# Patient Record
Sex: FEMALE | Employment: OTHER | URBAN - METROPOLITAN AREA
[De-identification: names, ages, dates, MRNs, and addresses within clinical notes are randomized per-mention and may not be internally consistent; named-entity substitution may affect disease eponyms.]

---

## 2017-06-10 LAB
ALT SERPL-CCNC: 4 IU/L (ref 0–32)
AST SERPL-CCNC: 5 IU/L (ref 0–40)
BUN SERPL-MCNC: 18 MG/DL (ref 8–27)
BUN/CREAT SERPL: 21 (ref 12–28)
CALCIUM SERPL-MCNC: 9.7 MG/DL (ref 8.7–10.3)
CHLORIDE SERPL-SCNC: 102 MMOL/L (ref 96–106)
CHOLEST SERPL-MCNC: 122 MG/DL (ref 100–199)
CO2 SERPL-SCNC: 23 MMOL/L (ref 18–29)
CREAT SERPL-MCNC: 0.86 MG/DL (ref 0.57–1)
EST. AVERAGE GLUCOSE BLD GHB EST-MCNC: 163 MG/DL
GLUCOSE SERPL-MCNC: 121 MG/DL (ref 65–99)
HBA1C MFR BLD: 7.3 % (ref 4.8–5.6)
HDLC SERPL-MCNC: 48 MG/DL
INTERPRETATION, 910389: NORMAL
LDLC SERPL CALC-MCNC: 62 MG/DL (ref 0–99)
Lab: NORMAL
POTASSIUM SERPL-SCNC: 3.6 MMOL/L (ref 3.5–5.2)
SODIUM SERPL-SCNC: 141 MMOL/L (ref 134–144)
TRIGL SERPL-MCNC: 59 MG/DL (ref 0–149)
VLDLC SERPL CALC-MCNC: 12 MG/DL (ref 5–40)

## 2017-06-14 DIAGNOSIS — E11.9 TYPE 2 DIABETES MELLITUS WITHOUT COMPLICATION, WITHOUT LONG-TERM CURRENT USE OF INSULIN (HCC): ICD-10-CM

## 2017-06-14 NOTE — LETTER
6/15/2017 8:34 AM 
 
Ms. Rosas Hill [address] Dear Rosas Hill: Please find your most recent results below. Resulted Orders ALT Result Value Ref Range ALT (SGPT) 4 0 - 32 IU/L Narrative Performed at:  [address]  [id number] : Tarun Nñio MD, Phone:  [phone] AST Result Value Ref Range AST (SGOT) 5 0 - 40 IU/L Narrative Performed at:  [address]  [id number] : Tarun Niño MD, Phone:  [phone] LIPID PANEL Result Value Ref Range Cholesterol, total 122 100 - 199 mg/dL Triglyceride 59 0 - 149 mg/dL HDL Cholesterol 48 >39 mg/dL VLDL, calculated 12 5 - 40 mg/dL LDL, calculated 62 0 - 99 mg/dL Narrative Performed at:  [address]  [id number] : Tarun Niño MD, Phone:  [phone] METABOLIC PANEL, BASIC Result Value Ref Range Glucose 121 (H) 65 - 99 mg/dL BUN 18 8 - 27 mg/dL Creatinine 0.86 0.57 - 1.00 mg/dL GFR est non-AA 62 >59 mL/min/1.73 GFR est AA 71 >59 mL/min/1.73  
 BUN/Creatinine ratio 21 12 - 28 Sodium 141 134 - 144 mmol/L Potassium 3.6 3.5 - 5.2 mmol/L Chloride 102 96 - 106 mmol/L  
 CO2 23 18 - 29 mmol/L Calcium 9.7 8.7 - 10.3 mg/dL Narrative Performed at:  [address]  [id number] : Tarun Niño MD, Phone:  [phone] HEMOGLOBIN A1C Result Value Ref Range Hemoglobin A1c 7.3 (H) 4.8 - 5.6 % Comment:  
            Pre-diabetes: 5.7 - 6.4 Diabetes: >6.4 Glycemic control for adults with diabetes: <7.0 Estimated average glucose 163 mg/dL Narrative Performed at:  [address]  [id number] : Tarun Niño MD, Phone:  [phone] CVD REPORT Result Value Ref Range INTERPRETATION Note Comment:  
   Medical Director's Note: This is an amended report on 
6/10/2017. The following panels were previously not 
reported: Albumin: Creatinine Ratio. Please review this 
report in its entirety, since changes may affect result 
interpretation(s) and/or treatment/follow-up suggestions. Supplement report is available. Narrative Performed at:  [address] [place] [address]  [id number] : Irina Maurer PhD, Phone:  [phone] DIABETES PATIENT EDUCATION Result Value Ref Range PDF Image Not applicable Narrative Performed at:  [address] [place] [address]  [id number] : Irina Maurer PhD, Phone:  [phone] RECOMMENDATIONS: 
All labs are stable. Please call me if you have any questions: [phone] Sincerely, 
 
 
Kourtney Goff, DO

## 2017-06-16 ENCOUNTER — OFFICE VISIT (OUTPATIENT)
Dept: INTERNAL MEDICINE CLINIC | Age: 82
End: 2017-06-16

## 2017-06-16 VITALS
WEIGHT: 110 LBS | HEIGHT: 62 IN | HEART RATE: 90 BPM | TEMPERATURE: 98.3 F | OXYGEN SATURATION: 97 % | BODY MASS INDEX: 20.24 KG/M2 | DIASTOLIC BLOOD PRESSURE: 50 MMHG | RESPIRATION RATE: 16 BRPM | SYSTOLIC BLOOD PRESSURE: 114 MMHG

## 2017-06-16 DIAGNOSIS — E78.00 HYPERCHOLESTEROLEMIA: ICD-10-CM

## 2017-06-16 DIAGNOSIS — E11.9 TYPE 2 DIABETES MELLITUS WITHOUT COMPLICATION, WITHOUT LONG-TERM CURRENT USE OF INSULIN (HCC): ICD-10-CM

## 2017-06-16 DIAGNOSIS — E11.9 TYPE 2 DIABETES MELLITUS WITHOUT COMPLICATION, WITHOUT LONG-TERM CURRENT USE OF INSULIN (HCC): Primary | ICD-10-CM

## 2017-06-16 DIAGNOSIS — Z23 ENCOUNTER FOR IMMUNIZATION: ICD-10-CM

## 2017-06-16 DIAGNOSIS — R41.3 MEMORY IMPAIRMENT: ICD-10-CM

## 2017-06-16 DIAGNOSIS — Z71.89 ACP (ADVANCE CARE PLANNING): ICD-10-CM

## 2017-06-16 DIAGNOSIS — Z00.00 MEDICARE ANNUAL WELLNESS VISIT, SUBSEQUENT: ICD-10-CM

## 2017-06-16 DIAGNOSIS — I10 ESSENTIAL HYPERTENSION: ICD-10-CM

## 2017-06-16 DIAGNOSIS — I69.319 CVA, OLD, COGNITIVE DEFICITS: ICD-10-CM

## 2017-06-16 RX ORDER — DONEPEZIL HYDROCHLORIDE 5 MG/1
5 TABLET, FILM COATED ORAL
Qty: 30 TAB | Refills: 1 | Status: SHIPPED | OUTPATIENT
Start: 2017-06-16 | End: 2017-12-01 | Stop reason: ALTCHOICE

## 2017-06-16 RX ORDER — POTASSIUM CHLORIDE 750 MG/1
TABLET, EXTENDED RELEASE ORAL
COMMUNITY
Start: 2017-05-10 | End: 2017-10-30 | Stop reason: SDUPTHER

## 2017-06-16 NOTE — PROGRESS NOTES
HISTORY OF PRESENT ILLNESS  Marcellus Nguyễn is a 80 y.o. female. Pt. comes in with her daughter for f/u. They live together. Has multiple medical problems. Her diabetes and hypertension are stable. Appetite is fair. Daughter reports worsening of pt's memory. Patient agrees. Reports compliance with medications and diet. Med list and most recent labs/studies reviewed with pt. Hgb is 7.3. Trying to be active physically to control weight. Reports insomnia. Could not tolerate trazodone. No tobacco or alcohol use. No allergies. Reports no other new c/o. Hypertension    Associated symptoms include blurred vision. Pertinent negatives include no chest pain, no malaise/fatigue, no headaches, no dizziness and no shortness of breath. Diabetes   Pertinent negatives include no chest pain, no abdominal pain, no headaches and no shortness of breath. Memory Loss    Her past medical history is significant for hypertension. Follow Up Chronic Condition   Pertinent negatives include no chest pain, no abdominal pain, no headaches and no shortness of breath. Review of Systems   Constitutional: Negative. Negative for malaise/fatigue. Eyes: Positive for blurred vision. Respiratory: Negative for shortness of breath. Cardiovascular: Negative for chest pain and leg swelling. Gastrointestinal: Negative for abdominal pain. Genitourinary: Negative for dysuria and frequency. Musculoskeletal: Positive for back pain and joint pain. Negative for falls. Skin: Negative for rash. Neurological: Negative for dizziness, sensory change and headaches. Psychiatric/Behavioral: Positive for memory loss. Negative for depression. The patient has insomnia. The patient is not nervous/anxious. All other systems reviewed and are negative. Physical Exam   Constitutional: She is oriented to person, place, and time. She appears well-developed and well-nourished. No distress.    HENT:   Head: Normocephalic and atraumatic. Mouth/Throat: Oropharynx is clear and moist.   Eyes: Conjunctivae are normal. No scleral icterus. Neck: Normal range of motion. Neck supple. No JVD present. No thyromegaly present. Cardiovascular: Normal rate, regular rhythm, normal heart sounds and intact distal pulses. No murmur heard. Pulmonary/Chest: Effort normal and breath sounds normal. No respiratory distress. She has no wheezes. She has no rales. Abdominal: Soft. Bowel sounds are normal. She exhibits no distension. There is no tenderness. Musculoskeletal: She exhibits no edema or tenderness. djd   Neurological: She is alert and oriented to person, place, and time. Coordination normal.   A+O to name and Romain  Doesn't know date or president's name   Skin: Skin is warm and dry. No rash noted. Psychiatric: She has a normal mood and affect. Her behavior is normal.   Nursing note and vitals reviewed. ASSESSMENT and PLAN  Tavon Whitman was seen today for hypertension, diabetes, memory loss and follow up chronic condition. Diagnoses and all orders for this visit:    Type 2 diabetes mellitus without complication, without long-term current use of insulin ([facility] [address]  -     LIPID PANEL; Future  -     METABOLIC PANEL, BASIC; Future  -     ALT; Future  -     AST; Future  -     HEMOGLOBIN A1C WITH EAG; Future    Encounter for immunization  -     Pneumococcal Admin ()  -     Pneumococcal Polysaccharide Vaccine    Essential hypertension    Hypercholesterolemia    CVA, old, cognitive deficits    Memory impairment    ACP (advance care planning)    Medicare annual wellness visit, subsequent    Other orders  -     donepezil (ARICEPT) 5 mg tablet; Take 1 Tab by mouth nightly. Follow-up Disposition:  Return in about 1 month (around 7/16/2017).    lab results and schedule of future lab studies reviewed with patient  reviewed diet, exercise and weight control  reviewed medications and side effects in detail  low cholesterol diet, weight control and daily exercise discussed, home glucose monitoring emphasized, all medications, side effects and compliance discussed carefully, foot care discussed and Podiatry visits discussed, annual eye examinations at Ophthalmology discussed, glycohemoglobin and other lab monitoring discussed and long term diabetic complications discussed  Overall stable  I have reviewed and agree with Medicare Annual Wellness visit done by NIMA Chaparro NP

## 2017-06-16 NOTE — PATIENT INSTRUCTIONS
Schedule of Personalized Health Plan  (Provide Copy to Patient)  The best way to stay healthy is to live a healthy lifestyle. A healthy lifestyle includes regular exercise, eating a well-balanced diet, keeping a healthy weight and not smoking. Regular physical exams and screening tests are another important way to take care of yourself. Preventive exams provided by health care providers can find health problems early when treatment works best and can keep you from getting certain diseases or illnesses. Preventive services include exams, lab tests, screenings, shots, monitoring and information to help you take care of your own health. All people over 65 should have a pneumonia shot. Pneumonia shots are usually only needed once in a lifetime unless your doctor decides differently. Given today. All people over 65 should have a yearly flu shot. Due in fall 2017    Bone mass measurement (dexa scan) is recommended every two years    Diabetes Mellitus screening is recommended every year. Glaucoma is an eye disease caused by high pressure in the eye. An eye exam is recommended every year. Up to date. Cardiovascular screening tests that check your cholesterol and other blood fat (lipid) levels are recommended every five years. Up to date.     Here is a list of your current Health Maintenance items with a due date:  Health Maintenance   Topic Date Due    OSTEOPOROSIS SCREENING (DEXA)  11/14/1996    Pneumococcal 65+ Low/Medium Risk (2 of 2 - PPSV23) Given today    INFLUENZA AGE 9 TO ADULT  08/01/2017    EYE EXAM RETINAL OR DILATED Q1  08/11/2017    HEMOGLOBIN A1C Q6M  12/09/2017    FOOT EXAM Q1  12/16/2017    MICROALBUMIN Q1  06/09/2018    LIPID PANEL Q1  06/09/2018    MEDICARE YEARLY EXAM  06/17/2018    GLAUCOMA SCREENING Q2Y  08/11/2018    DTaP/Tdap/Td series (2 - Td) 06/16/2027    ZOSTER VACCINE AGE 60>  Completed

## 2017-06-16 NOTE — PROGRESS NOTES
Chief Complaint   Patient presents with    Hypertension    Diabetes     Reviewed record  In preparation for visit and have obtained necessary documentation. 1. Have you been to the ER, urgent care clinic since your last visit? Hospitalized since your last visit? No    2. Have you seen or consulted any other health care providers outside of the [address] [place] [address] since your last visit? Include any pap smears or colon screening. No      Used 2 patient I. D. 's  Patient observed X 15 min following vaccine, no side effects noted.

## 2017-06-16 NOTE — MR AVS SNAPSHOT
Visit Information Date & Time Provider Department Dept. Phone Encounter #  
 6/16/2017  1:45 PM Dave [facility] Internal Medicine [phone] [id number] Follow-up Instructions Return in about 1 month (around 7/16/2017). Upcoming Health Maintenance Date Due OSTEOPOROSIS SCREENING (DEXA) 11/14/1996 INFLUENZA AGE 9 TO ADULT 8/1/2017 EYE EXAM RETINAL OR DILATED Q1 8/11/2017 HEMOGLOBIN A1C Q6M 12/9/2017 FOOT EXAM Q1 12/16/2017 MICROALBUMIN Q1 6/9/2018 LIPID PANEL Q1 6/9/2018 MEDICARE YEARLY EXAM 6/17/2018 GLAUCOMA SCREENING Q2Y 8/11/2018 DTaP/Tdap/Td series (2 - Td) 6/16/2027 Allergies as of 6/16/2017  Review Complete On: 6/16/2017 By: Lobo Bhatti, DO No Known Allergies Current Immunizations  Reviewed on 12/16/2016 Name Date Influenza High Dose Vaccine PF 12/16/2016, 11/27/2015 12:09 PM  
 Influenza Vaccine 11/14/2014 Pneumococcal Conjugate (PCV-13) 11/27/2015 12:10 PM  
 Pneumococcal Polysaccharide (PPSV-23)  Incomplete Not reviewed this visit You Were Diagnosed With   
  
 Codes Comments Type 2 diabetes mellitus without complication, without long-term current use of insulin (HCC)    -  Primary ICD-10-CM: E11.9 ICD-9-CM: 250.00 Encounter for immunization     ICD-10-CM: E32 ICD-9-CM: V03.89 Essential hypertension     ICD-10-CM: I10 
ICD-9-CM: 401.9 Hypercholesterolemia     ICD-10-CM: E78.00 ICD-9-CM: 272.0   
 CVA, old, cognitive deficits     ICD-10-CM: I69.319 ICD-9-CM: 438.0 Memory impairment     ICD-10-CM: R41.3 ICD-9-CM: 780.93 Vitals BP Pulse Temp Resp Height(growth percentile) Weight(growth percentile) 114/50 90 98.3 °F (36.8 °C) (Oral) 16 5' 2\" (1.575 m) 110 lb (49.9 kg) SpO2 BMI OB Status Smoking Status 97% 20.12 kg/m2 Hysterectomy Former Smoker Vitals History BMI and BSA Data  Body Mass Index Body Surface Area  
 20.12 kg/m 2 1.48 m 2  
  
  
 Preferred Pharmacy Pharmacy Name Phone [address] [facility], [address] [place] [address] [phone] Your Updated Medication List  
  
   
This list is accurate as of: 6/16/17  1:49 PM.  Always use your most recent med list.  
  
  
  
  
 acetaminophen 650 mg Tab Take 650 mg by mouth every six (6) hours as needed. amLODIPine 10 mg tablet Commonly known as:  Rani Million TAKE ONE TABLET BY MOUTH EVERY DAY  
  
 aspirin 325 mg tablet Commonly known as:  ASPIRIN Take 1 Tab by mouth daily. atorvastatin 40 mg tablet Commonly known as:  LIPITOR  
TAKE ONE TABLET BY MOUTH EVERY DAY  
  
 cloNIDine HCl 0.1 mg tablet Commonly known as:  CATAPRES  
TAKE ONE TABLET BY MOUTH AT BEDTIME  
  
 donepezil 5 mg tablet Commonly known as:  ARICEPT Take 1 Tab by mouth nightly. * KLOR-CON 10 10 mEq tablet Generic drug:  potassium chloride SR Take 10 mEq by mouth. 2 po daily * potassium chloride 10 mEq tablet Commonly known as:  KLOR-CON  
  
 losartan-hydroCHLOROthiazide 100-25 mg per tablet Commonly known as:  HYZAAR  
TAKE ONE TABLET BY MOUTH EVERY DAY  
  
 metFORMIN 500 mg tablet Commonly known as:  GLUCOPHAGE  
TAKE ONE TABLET BY MOUTH TWO TIMES A DAY WITH MEALS * Notice: This list has 2 medication(s) that are the same as other medications prescribed for you. Read the directions carefully, and ask your doctor or other care provider to review them with you. Prescriptions Sent to Pharmacy Refills  
 donepezil (ARICEPT) 5 mg tablet 1 Sig: Take 1 Tab by mouth nightly. Class: Normal  
 Pharmacy: [address] [place] [address] #: [phone] Route: Oral  
  
We Performed the Following ADMIN PNEUMOCOCCAL VACCINE [ [facility]] PNEUMOCOCCAL POLYSACCHARIDE VACCINE, 23-VALENT, ADULT OR IMMUNOSUPPRESSED PT DOSE, [64679 CPT(R)] Follow-up Instructions Return in about 1 month (around 7/16/2017). To-Do List   
 12/13/2017 Lab:  ALT   
  
 12/13/2017 Lab:  AST   
  
 12/13/2017 Lab:  LIPID PANEL   
  
 12/13/2017 Lab:  METABOLIC PANEL, BASIC   
  
 12/16/2017 Lab:  HEMOGLOBIN A1C WITH EAG Patient Instructions Schedule of Personalized Health Plan (Provide Copy to Patient) The best way to stay healthy is to live a healthy lifestyle. A healthy lifestyle includes regular exercise, eating a well-balanced diet, keeping a healthy weight and not smoking. Regular physical exams and screening tests are another important way to take care of yourself. Preventive exams provided by health care providers can find health problems early when treatment works best and can keep you from getting certain diseases or illnesses. Preventive services include exams, lab tests, screenings, shots, monitoring and information to help you take care of your own health. All people over 65 should have a pneumonia shot. Pneumonia shots are usually only needed once in a lifetime unless your doctor decides differently. Given today. All people over 65 should have a yearly flu shot. Due in fall 2017 Bone mass measurement (dexa scan) is recommended every two years Diabetes Mellitus screening is recommended every year. Glaucoma is an eye disease caused by high pressure in the eye. An eye exam is recommended every year. Up to date. Cardiovascular screening tests that check your cholesterol and other blood fat (lipid) levels are recommended every five years. Up to date. Here is a list of your current Health Maintenance items with a due date: 
Health Maintenance Topic Date Due  
 OSTEOPOROSIS SCREENING (DEXA)  11/14/1996  Pneumococcal 65+ Low/Medium Risk (2 of 2 - PPSV23) Given today  INFLUENZA AGE 9 TO ADULT  08/01/2017  
 EYE EXAM RETINAL OR DILATED Q1  08/11/2017  
 HEMOGLOBIN A1C Q6M  12/09/2017  
 FOOT EXAM Q1  12/16/2017  MICROALBUMIN Q1  06/09/2018  LIPID PANEL Q1  06/09/2018  MEDICARE YEARLY EXAM  06/17/2018  GLAUCOMA SCREENING Q2Y  08/11/2018  DTaP/Tdap/Td series (2 - Td) 06/16/2027  ZOSTER VACCINE AGE 60>  Completed Introducing [facility]! [employer] introduces Adteractive patient portal. Now you can access parts of your medical record, email your doctor's office, and request medication refills online. 1. In your internet browser, go to [email or web address] 2. Click on the First Time User? Click Here link in the Sign In box. You will see the New Member Sign Up page. 3. Enter your Adteractive Access Code exactly as it appears below. You will not need to use this code after youve completed the sign-up process. If you do not sign up before the expiration date, you must request a new code. · Adteractive Access Code: [email or web address] Expires: 9/14/2017  1:41 PM 
 
4. Enter the last four digits of your Social Security Number (xxxx) and Date of Birth (mm/dd/yyyy) as indicated and click Submit. You will be taken to the next sign-up page. 5. Create a Adteractive ID. This will be your Adteractive login ID and cannot be changed, so think of one that is secure and easy to remember. 6. Create a Adteractive password. You can change your password at any time. 7. Enter your Password Reset Question and Answer. This can be used at a later time if you forget your password. 8. Enter your e-mail address. You will receive e-mail notification when new information is available in [address] 9. Click Sign Up. You can now view and download portions of your medical record. 10. Click the Download Summary menu link to download a portable copy of your medical information. If you have questions, please visit the Frequently Asked Questions section of the Adteractive website. Remember, [employer]teractive is NOT to be used for urgent needs. For medical emergencies, dial 911. Now available from your iPhone and Android! Please provide this summary of care documentation to your next provider. Your primary care clinician is listed as Yadi Turner. If you have any questions after today's visit, please call [phone].

## 2017-06-16 NOTE — PROGRESS NOTES
David Humphrey is a 80 y.o. female and presents for annual Medicare Wellness Visit. Problem List: Reviewed with patient and discussed risk factors. Patient Active Problem List   Diagnosis Code    [facility] O18.0    Essential hypertension I10    Hypercholesterolemia E78.00    CVA, old, cognitive deficits I69.319    Insomnia G47.00    Cataracts, bilateral H26.9    Chronic diarrhea K52.9    ACP (advance care planning) Z71.89    Memory impairment R41.3       Current medical providers:  Patient Care Team:  Nuvia Abraham DO as PCP - General (Internal Medicine)    PSH: Reviewed with patient  History reviewed. No pertinent surgical history. SH: Reviewed with patient  Social History   Substance Use Topics    Smoking status: Former Smoker     Packs/day: 0.25     Quit date: 1/1/2012    Smokeless tobacco: Never Used    Alcohol use No       FH: Reviewed with patient  Family History   Problem Relation Age of Onset    No Known Problems Mother     No Known Problems Father        Medications/Allergies: Reviewed with patient  Current Outpatient Prescriptions on File Prior to Visit   Medication Sig Dispense Refill    amLODIPine (NORVASC) 10 mg tablet TAKE ONE TABLET BY MOUTH EVERY DAY 30 Tab 3    losartan-hydroCHLOROthiazide (HYZAAR) 100-25 mg per tablet TAKE ONE TABLET BY MOUTH EVERY DAY 30 Tab 11    cloNIDine HCl (CATAPRES) 0.1 mg tablet TAKE ONE TABLET BY MOUTH AT BEDTIME 30 Tab 5    metFORMIN (GLUCOPHAGE) 500 mg tablet TAKE ONE TABLET BY MOUTH TWO TIMES A DAY WITH MEALS 60 Tab 5    atorvastatin (LIPITOR) 40 mg tablet TAKE ONE TABLET BY MOUTH EVERY DAY 30 Tab 5    traZODone (DESYREL) 100 mg tablet TAKE ONE TABLET BY MOUTH AT NIGHT 30 Tab 5    acetaminophen 650 mg Tab Take 650 mg by mouth every six (6) hours as needed. 100 Tab 0    aspirin (ASPIRIN) 325 mg tablet Take 1 Tab by mouth daily.  100 Tab 0    potassium chloride SR (KLOR-CON 10) 10 mEq tablet Take 10 mEq by mouth. 2 po daily       No current facility-administered medications on file prior to visit. No Known Allergies    Objective:  Visit Vitals    /50    Pulse 90    Temp 98.3 °F (36.8 °C) (Oral)    Resp 16    Ht 5' 2\" (1.575 m)    Wt 110 lb (49.9 kg)    SpO2 97%    BMI 20.12 kg/m2    Body mass index is 20.12 kg/(m^2). Assessment of cognitive impairment: Alert and oriented x 3    Depression Screen:   PHQ over the last two weeks 6/16/2017   Little interest or pleasure in doing things Not at all   Feeling down, depressed or hopeless Not at all   Total Score PHQ 2 0       Fall Risk Assessment:    Fall Risk Assessment, last 12 mths 6/16/2017   Able to walk? Yes   Fall in past 12 months? No       Functional Ability:   Does the patient exhibit a steady gait? yes   How long did it take the patient to get up and walk from a sitting position? <5 seconds   Is the patient self reliant?  (ie can do own laundry, meals, household chores)  yes     Does the patient handle his/her own medications? yes     Does the patient handle his/her own money? no     Is the patients home safe (ie good lighting, handrails on stairs and bath, etc.)? yes     Did you notice or did patient express any hearing difficulties? no     Did you notice or did patient express any vision difficulties?    no          Advance Care Planning:   Patient was offered the opportunity to discuss advance care planning:  yes     Does patient have an Advance Directive:  yes          Plan:      Orders Placed This Encounter    Pneumococcal Polysaccharide Vaccine    Pneumococcal Admin ()    potassium chloride (KLOR-CON) 10 mEq tablet       Health Maintenance   Topic Date Due    OSTEOPOROSIS SCREENING (DEXA)  11/14/1996    Pneumococcal 65+ Low/Medium Risk (2 of 2 - PPSV23) Given today    INFLUENZA AGE 9 TO ADULT  08/01/2017    EYE EXAM RETINAL OR DILATED Q1  08/11/2017    HEMOGLOBIN A1C Q6M  12/09/2017    FOOT EXAM Q1 12/16/2017    MICROALBUMIN Q1  06/09/2018    LIPID PANEL Q1  06/09/2018    MEDICARE YEARLY EXAM  06/17/2018    GLAUCOMA SCREENING Q2Y  08/11/2018    DTaP/Tdap/Td series (2 - Td) 06/16/2027    ZOSTER VACCINE AGE 60>  Completed       *Patient verbalized understanding and agreement with the plan. A copy of the After Visit Summary with personalized health plan was given to the patient today.

## 2017-06-16 NOTE — ACP (ADVANCE CARE PLANNING)
Pt daughter states that the patient has an advanced care plan and that she will bring it to her next visit.

## 2017-10-16 RX ORDER — CLONIDINE HYDROCHLORIDE 0.1 MG/1
TABLET ORAL
Qty: 30 TAB | Refills: 0 | Status: SHIPPED | OUTPATIENT
Start: 2017-10-16 | End: 2017-12-04 | Stop reason: SDUPTHER

## 2017-11-14 RX ORDER — AMLODIPINE BESYLATE 10 MG/1
TABLET ORAL
Qty: 30 TAB | Refills: 0 | Status: SHIPPED | OUTPATIENT
Start: 2017-11-14 | End: 2018-01-02 | Stop reason: SDUPTHER

## 2017-11-24 NOTE — LETTER
11/29/2017 3:59 PM 
 
Ms. Lars Schlatter [address] Dear Rene Schlatter: Please find your most recent results below. Resulted Orders METABOLIC PANEL, BASIC Result Value Ref Range Glucose 123 (H) 65 - 99 mg/dL BUN 17 8 - 27 mg/dL Creatinine 0.94 0.57 - 1.00 mg/dL GFR est non-AA 55 (L) >59 mL/min/1.73 GFR est AA 64 >59 mL/min/1.73  
 BUN/Creatinine ratio 18 12 - 28 Sodium 140 134 - 144 mmol/L Potassium 4.2 3.5 - 5.2 mmol/L Chloride 102 96 - 106 mmol/L  
 CO2 21 18 - 29 mmol/L Calcium 9.7 8.7 - 10.3 mg/dL Narrative Performed at:  [address]  [id number] : Patricia Gay MD, Phone:  [phone] LIPID PANEL Result Value Ref Range Cholesterol, total 127 100 - 199 mg/dL Triglyceride 57 0 - 149 mg/dL HDL Cholesterol 58 >39 mg/dL VLDL, calculated 11 5 - 40 mg/dL LDL, calculated 58 0 - 99 mg/dL Narrative Performed at:  [address]  [id number] : Patricia Gay MD, Phone:  [phone] CVD REPORT Result Value Ref Range INTERPRETATION Note Comment:  
   Supplemental report is available. PDF IMAGE Not applicable Narrative Performed at:  [address] [place] [address]  [id number] : Cristofer Ryan PhD, Phone:  [phone] CKD REPORT Result Value Ref Range Interpretation Note Comment:  
   Supplemental report is available. Narrative Performed at:  [address] [place] [address]  [id number] : Cristofer Ryan PhD, Phone:  [phone] HEMOGLOBIN A1C WITH EAG Result Value Ref Range Hemoglobin A1c 7.3 (H) 4.8 - 5.6 % Comment:  
            Pre-diabetes: 5.7 - 6.4 Diabetes: >6.4 Glycemic control for adults with diabetes: <7.0 Estimated average glucose 163 mg/dL Narrative Performed at:  [address]  [id number] : Martha Morales MD, Phone:  [phone] DIABETES PATIENT EDUCATION Result Value Ref Range PDF Image Not applicable Narrative Performed at:  [address] [place] [address]  [id number] : Sathya Hanna PhD, Phone:  [phone] AST Result Value Ref Range AST (SGOT) 9 0 - 40 IU/L Narrative Performed at:  [address]  [id number] : Martha Morales MD, Phone:  [phone] ALT Result Value Ref Range ALT (SGPT) 7 0 - 32 IU/L Narrative Performed at:  [address]  [id number] : Martha Morales MD, Phone:  [phone] RECOMMENDATIONS: 
1.  Hemoglobin A1c 7.3.  Encourage patient to eat a healther Mediterranean style diet with 55% or less of calories from carbohydrates. Try to eat more vegetables, whole fruit, nuts, whole grains, yogurt and less refined grains. Eat less red meat. Chicken and fish would be good protein sources. Aim to eat less than 45 grams of carbohydrates per meal.  
Other labs stable.    
 
Please call me if you have any questions: [phone] Sincerely, 
 
 
Radha Jimenez, DO

## 2017-11-25 LAB
ALT SERPL-CCNC: 7 IU/L (ref 0–32)
AST SERPL-CCNC: 9 IU/L (ref 0–40)
BUN SERPL-MCNC: 17 MG/DL (ref 8–27)
BUN/CREAT SERPL: 18 (ref 12–28)
CALCIUM SERPL-MCNC: 9.7 MG/DL (ref 8.7–10.3)
CHLORIDE SERPL-SCNC: 102 MMOL/L (ref 96–106)
CHOLEST SERPL-MCNC: 127 MG/DL (ref 100–199)
CO2 SERPL-SCNC: 21 MMOL/L (ref 18–29)
CREAT SERPL-MCNC: 0.94 MG/DL (ref 0.57–1)
EST. AVERAGE GLUCOSE BLD GHB EST-MCNC: 163 MG/DL
GFR SERPLBLD CREATININE-BSD FMLA CKD-EPI: 55 ML/MIN/1.73
GFR SERPLBLD CREATININE-BSD FMLA CKD-EPI: 64 ML/MIN/1.73
GLUCOSE SERPL-MCNC: 123 MG/DL (ref 65–99)
HBA1C MFR BLD: 7.3 % (ref 4.8–5.6)
HDLC SERPL-MCNC: 58 MG/DL
INTERPRETATION, 910389: NORMAL
INTERPRETATION: NORMAL
LDLC SERPL CALC-MCNC: 58 MG/DL (ref 0–99)
Lab: NORMAL
PDF IMAGE, 910387: NORMAL
POTASSIUM SERPL-SCNC: 4.2 MMOL/L (ref 3.5–5.2)
SODIUM SERPL-SCNC: 140 MMOL/L (ref 134–144)
TRIGL SERPL-MCNC: 57 MG/DL (ref 0–149)
VLDLC SERPL CALC-MCNC: 11 MG/DL (ref 5–40)

## 2017-11-27 NOTE — PROGRESS NOTES
1.  Hemoglobin A1c 7.3. Encourage patient to eat a healther Mediterranean style diet with 55% or less of calories from carbohydrates. Try to eat more vegetables, whole fruit, nuts, whole grains, yogurt and less refined grains. Eat less red meat. Chicken and fish would be good protein sources. Aim to eat less than 45 grams of carbohydrates per meal.  Other labs stable.

## 2017-12-01 ENCOUNTER — OFFICE VISIT (OUTPATIENT)
Dept: INTERNAL MEDICINE CLINIC | Age: 82
End: 2017-12-01

## 2017-12-01 VITALS
HEIGHT: 62 IN | WEIGHT: 113.4 LBS | RESPIRATION RATE: 18 BRPM | DIASTOLIC BLOOD PRESSURE: 68 MMHG | BODY MASS INDEX: 20.87 KG/M2 | OXYGEN SATURATION: 97 % | HEART RATE: 83 BPM | SYSTOLIC BLOOD PRESSURE: 132 MMHG

## 2017-12-01 DIAGNOSIS — I10 ESSENTIAL HYPERTENSION: ICD-10-CM

## 2017-12-01 DIAGNOSIS — Z23 ENCOUNTER FOR IMMUNIZATION: ICD-10-CM

## 2017-12-01 DIAGNOSIS — E78.00 HYPERCHOLESTEROLEMIA: ICD-10-CM

## 2017-12-01 DIAGNOSIS — I69.319 CVA, OLD, COGNITIVE DEFICITS: ICD-10-CM

## 2017-12-01 DIAGNOSIS — D64.9 CHRONIC ANEMIA: ICD-10-CM

## 2017-12-01 DIAGNOSIS — E11.9 TYPE 2 DIABETES MELLITUS WITHOUT COMPLICATION, WITHOUT LONG-TERM CURRENT USE OF INSULIN (HCC): Primary | ICD-10-CM

## 2017-12-01 NOTE — LETTER
12/6/2017 10:14 AM 
 
Ms. Jaleel Cruz [address] Dear Reggieissa Cruz: Please find your most recent results below. Resulted Orders CBC W/O DIFF Result Value Ref Range WBC 5.9 3.4 - 10.8 x10E3/uL  
 RBC 3.66 (L) 3.77 - 5.28 x10E6/uL HGB 9.9 (L) 11.1 - 15.9 g/dL Comment: **Effective December 4, 2017 the reference interval** 
  for Hemoglobin MALES only will be changing to: Males 13-15 years: 12.6 - 17.7 Males   >15 years: 13.0 - 17.7 HCT 30.5 (L) 34.0 - 46.6 % MCV 83 79 - 97 fL  
 MCH 27.0 26.6 - 33.0 pg  
 MCHC 32.5 31.5 - 35.7 g/dL  
 RDW 16.0 (H) 12.3 - 15.4 % PLATELET 410 076 - 557 x10E3/uL Narrative Performed at:  [address]  [id number] : Spencer Rudolph MD, Phone:  [phone] RECOMMENDATIONS: 
Stable chronic anemia Decrease aspirin to 81 mg daily Start Slow Fe 1 daily Please call me if you have any questions: [phone] Sincerely, 
 
 
Doris Polo, DO

## 2017-12-01 NOTE — PROGRESS NOTES
HISTORY OF PRESENT ILLNESS  Liss Miller is a 80 y.o. female. Pt. comes in with her daughter for f/u. Has multiple medical problems. Reports chronic arthritic pain. Has had some pain in left hip. No fall or trauma. Takes Advil as needed. Has chronic anemia but no GI issues. DM is stable. Memory is stable. Did not take Aricept because of possible side effects. Reports compliance with medications and diet. Med list and most recent labs/studies reviewed with pt. all look stable. Trying to be active physically as tolerated. No tobacco or alcohol use. Reports no other new c/o. Hypertension    Associated symptoms include blurred vision. Pertinent negatives include no chest pain, no headaches, no dizziness and no shortness of breath. Diabetes   Pertinent negatives include no chest pain, no abdominal pain, no headaches and no shortness of breath. Insomnia   Pertinent negatives include no chest pain, no abdominal pain, no headaches and no shortness of breath. Follow Up Chronic Condition   Pertinent negatives include no chest pain, no abdominal pain, no headaches and no shortness of breath. Review of Systems   Constitutional: Negative. HENT: Negative. Eyes: Positive for blurred vision. Respiratory: Negative for shortness of breath. Cardiovascular: Negative for chest pain and leg swelling. Gastrointestinal: Negative for abdominal pain. Genitourinary: Negative for dysuria and frequency. Musculoskeletal: Positive for back pain and joint pain. Negative for falls. Skin: Negative. Neurological: Negative for dizziness, sensory change, focal weakness and headaches. Psychiatric/Behavioral: Positive for memory loss. Negative for depression. The patient has insomnia. The patient is not nervous/anxious. All other systems reviewed and are negative. Physical Exam   Constitutional: She is oriented to person, place, and time. She appears well-developed and well-nourished.  No distress. HENT:   Head: Normocephalic and atraumatic. Mouth/Throat: Oropharynx is clear and moist.   Eyes: Conjunctivae are normal.   Neck: Normal range of motion. Neck supple. No JVD present. No thyromegaly present. Cardiovascular: Normal rate, regular rhythm, normal heart sounds and intact distal pulses. No murmur heard. Pulmonary/Chest: Effort normal and breath sounds normal. No respiratory distress. She has no wheezes. She has no rales. Abdominal: Soft. Bowel sounds are normal. She exhibits no distension. Musculoskeletal: She exhibits no edema or tenderness. djd   Neurological: She is alert and oriented to person, place, and time. Coordination normal.   A+O to name and Romain  Doesn't know date or president's name   Skin: Skin is warm and dry. Psychiatric: She has a normal mood and affect. Her behavior is normal.   Nursing note and vitals reviewed. ASSESSMENT and PLAN  Diagnoses and all orders for this visit:    1. Type 2 diabetes mellitus without complication, without long-term current use of insulin (HCC)  -     LIPID PANEL; Future  -     METABOLIC PANEL, BASIC; Future  -     ALT; Future  -     AST; Future  -     CBC WITH AUTOMATED DIFF; Future  -     HEMOGLOBIN A1C WITH EAG; Future    2. Essential hypertension    3. Hypercholesterolemia    4. Chronic anemia  -     CBC W/O DIFF    5. CVA, old, cognitive deficits    6. Encounter for immunization  -     INFLUENZA VIRUS VACCINE, HIGH DOSE SEASONAL, PRESERVATIVE FREE      Follow-up Disposition:  Return in about 6 months (around 6/1/2018).    lab results and schedule of future lab studies reviewed with patient  reviewed diet, exercise and weight control  reviewed medications and side effects in detail  low cholesterol diet, weight control and daily exercise discussed, home glucose monitoring emphasized, all medications, side effects and compliance discussed carefully, foot care discussed and Podiatry visits discussed, annual eye examinations at Ophthalmology discussed, glycohemoglobin and other lab monitoring discussed and long term diabetic complications discussed  Overall stable  We will recheck CBC today

## 2017-12-01 NOTE — PROGRESS NOTES
Health Maintenance Due   Topic Date Due    OSTEOPOROSIS SCREENING (DEXA)  11/14/1996    Influenza Age 5 to Adult  08/01/2017    EYE EXAM RETINAL OR DILATED Q1  08/11/2017    FOOT EXAM Q1  12/16/2017       Chief Complaint   Patient presents with    Hypertension    Diabetes    Insomnia       1. Have you been to the ER, urgent care clinic since your last visit? Hospitalized since your last visit? No    2. Have you seen or consulted any other health care providers outside of the [address] [place] [address] since your last visit? Include any pap smears or colon screening. No    3) Do you have an Advance Directive on file? no    4) Are you interested in receiving information on Advance Directives? NO      Patient is accompanied by self I have received verbal consent from Brayan Chua to discuss any/all medical information while they are present in the room.

## 2017-12-02 LAB
ERYTHROCYTE [DISTWIDTH] IN BLOOD BY AUTOMATED COUNT: 16 % (ref 12.3–15.4)
HCT VFR BLD AUTO: 30.5 % (ref 34–46.6)
HGB BLD-MCNC: 9.9 G/DL (ref 11.1–15.9)
MCH RBC QN AUTO: 27 PG (ref 26.6–33)
MCHC RBC AUTO-ENTMCNC: 32.5 G/DL (ref 31.5–35.7)
MCV RBC AUTO: 83 FL (ref 79–97)
PLATELET # BLD AUTO: 371 X10E3/UL (ref 150–379)
RBC # BLD AUTO: 3.66 X10E6/UL (ref 3.77–5.28)
WBC # BLD AUTO: 5.9 X10E3/UL (ref 3.4–10.8)

## 2017-12-04 RX ORDER — CLONIDINE HYDROCHLORIDE 0.1 MG/1
TABLET ORAL
Qty: 30 TAB | Refills: 0 | Status: SHIPPED | OUTPATIENT
Start: 2017-12-04 | End: 2018-01-15 | Stop reason: SDUPTHER

## 2017-12-06 RX ORDER — LANOLIN ALCOHOL/MO/W.PET/CERES
325 CREAM (GRAM) TOPICAL
Qty: 30 TAB | Refills: 2 | Status: SHIPPED | OUTPATIENT
Start: 2017-12-06 | End: 2018-03-07 | Stop reason: SDUPTHER

## 2017-12-13 DIAGNOSIS — E11.9 TYPE 2 DIABETES MELLITUS WITHOUT COMPLICATION, WITHOUT LONG-TERM CURRENT USE OF INSULIN (HCC): ICD-10-CM

## 2017-12-16 DIAGNOSIS — E11.9 TYPE 2 DIABETES MELLITUS WITHOUT COMPLICATION, WITHOUT LONG-TERM CURRENT USE OF INSULIN (HCC): ICD-10-CM

## 2017-12-27 ENCOUNTER — OFFICE VISIT (OUTPATIENT)
Dept: INTERNAL MEDICINE CLINIC | Age: 82
End: 2017-12-27

## 2017-12-27 VITALS
DIASTOLIC BLOOD PRESSURE: 64 MMHG | TEMPERATURE: 98.3 F | OXYGEN SATURATION: 95 % | RESPIRATION RATE: 16 BRPM | SYSTOLIC BLOOD PRESSURE: 124 MMHG | BODY MASS INDEX: 20.24 KG/M2 | HEIGHT: 62 IN | WEIGHT: 110 LBS | HEART RATE: 100 BPM

## 2017-12-27 DIAGNOSIS — J40 BRONCHITIS: Primary | ICD-10-CM

## 2017-12-27 RX ORDER — DOXYCYCLINE 100 MG/1
100 TABLET ORAL 2 TIMES DAILY
Qty: 20 TAB | Refills: 0 | Status: SHIPPED | OUTPATIENT
Start: 2017-12-27 | End: 2018-02-02 | Stop reason: ALTCHOICE

## 2017-12-27 RX ORDER — PREDNISONE 5 MG/1
5 TABLET ORAL 2 TIMES DAILY
Qty: 14 TAB | Refills: 0 | Status: SHIPPED | OUTPATIENT
Start: 2017-12-27 | End: 2018-01-03

## 2017-12-27 NOTE — MR AVS SNAPSHOT
Visit Information Date & Time Provider Department Dept. Phone Encounter #  
 12/27/2017 11:00 AM Valerie Li, [facility] Internal Medicine [phone] [id number] Upcoming Health Maintenance Date Due OSTEOPOROSIS SCREENING (DEXA) 11/14/1996 EYE EXAM RETINAL OR DILATED Q1 8/11/2017 HEMOGLOBIN A1C Q6M 5/24/2018 MICROALBUMIN Q1 6/9/2018 MEDICARE YEARLY EXAM 6/17/2018 GLAUCOMA SCREENING Q2Y 8/11/2018 LIPID PANEL Q1 11/24/2018 FOOT EXAM Q1 12/1/2018 DTaP/Tdap/Td series (2 - Td) 6/16/2027 Allergies as of 12/27/2017  Review Complete On: 12/27/2017 By: Valerie Li NP No Known Allergies Current Immunizations  Reviewed on 6/16/2017 Name Date Influenza High Dose Vaccine PF 12/1/2017, 12/16/2016, 11/27/2015 12:09 PM  
 Influenza Vaccine 11/14/2014 Pneumococcal Conjugate (PCV-13) 11/27/2015 12:10 PM  
 Pneumococcal Polysaccharide (PPSV-23) 6/16/2017 Not reviewed this visit You Were Diagnosed With   
  
 Codes Comments Bronchitis    -  Primary ICD-10-CM: Q72 ICD-9-CM: 816 Vitals BP Pulse Temp Resp Height(growth percentile) Weight(growth percentile) 124/64 (BP 1 Location: Right arm, BP Patient Position: Sitting) 100 98.3 °F (36.8 °C) (Oral) 16 5' 2\" (1.575 m) 110 lb (49.9 kg) SpO2 BMI OB Status Smoking Status 95% 20.12 kg/m2 Hysterectomy Former Smoker BMI and BSA Data Body Mass Index Body Surface Area  
 20.12 kg/m 2 1.48 m 2 Preferred Pharmacy Pharmacy Name Phone [facility] [address], [address] [facility] [phone] Your Updated Medication List  
  
   
This list is accurate as of: 12/27/17 12:06 PM.  Always use your most recent med list.  
  
  
  
  
 acetaminophen 650 mg Tab Take 650 mg by mouth every six (6) hours as needed. amLODIPine 10 mg tablet Commonly known as:  Industry Blade TAKE ONE TABLET BY MOUTH EVERY DAY  
  
 aspirin 325 mg tablet Commonly known as:  ASPIRIN Take 1 Tab by mouth daily. atorvastatin 40 mg tablet Commonly known as:  LIPITOR  
TAKE ONE TABLET BY MOUTH EVERY DAY  
  
 cloNIDine HCl 0.1 mg tablet Commonly known as:  CATAPRES  
TAKE ONE TABLET BY MOUTH AT BEDTIME  
  
 doxycycline 100 mg tablet Commonly known as:  ADOXA Take 1 Tab by mouth two (2) times a day. ferrous sulfate 325 mg (65 mg iron) tablet Commonly known as:  Iron (Ferrous Sulfate) Take 1 Tab by mouth Daily (before breakfast). losartan-hydroCHLOROthiazide 100-25 mg per tablet Commonly known as:  HYZAAR  
TAKE ONE TABLET BY MOUTH EVERY DAY  
  
 metFORMIN 500 mg tablet Commonly known as:  GLUCOPHAGE  
TAKE ONE TABLET BY MOUTH TWO TIMES A DAY WITH MEALS  
  
 potassium chloride 10 mEq tablet Commonly known as:  KLOR-CON  
TAKE TWO TABLETS BY MOUTH EVERY DAY  
  
 predniSONE 5 mg tablet Commonly known as:  Alinda Kiersten Take 1 Tab by mouth two (2) times a day for 7 days. Prescriptions Sent to Pharmacy Refills  
 doxycycline (ADOXA) 100 mg tablet 0 Sig: Take 1 Tab by mouth two (2) times a day. Class: Normal  
 Pharmacy: [facility] [address] [place] [address] Ph #: [phone] Route: Oral  
 predniSONE (DELTASONE) 5 mg tablet 0 Sig: Take 1 Tab by mouth two (2) times a day for 7 days. Class: Normal  
 Pharmacy: [address] [place] [address] Ph #: [phone] Route: Oral  
  
Patient Instructions Cough: Care Instructions Your Care Instructions A cough is your body's response to something that bothers your throat or airways. Many things can cause a cough. You might cough because of a cold or the flu, bronchitis, or asthma. Smoking, postnasal drip, allergies, and stomach acid that backs up into your throat also can cause coughs. A cough is a symptom, not a disease. Most coughs stop when the cause, such as a cold, goes away. You can take a few steps at home to cough less and feel better. Follow-up care is a key part of your treatment and safety. Be sure to make and go to all appointments, and call your doctor if you are having problems. It's also a good idea to know your test results and keep a list of the medicines you take. How can you care for yourself at home? · Drink lots of water and other fluids. This helps thin the mucus and soothes a dry or sore throat. Honey or lemon juice in hot water or tea may ease a dry cough. · Take cough medicine as directed by your doctor. · Prop up your head on pillows to help you breathe and ease a dry cough. · Try cough drops to soothe a dry or sore throat. Cough drops don't stop a cough. Medicine-flavored cough drops are no better than candy-flavored drops or hard candy. · Do not smoke. Avoid secondhand smoke. If you need help quitting, talk to your doctor about stop-smoking programs and medicines. These can increase your chances of quitting for good. When should you call for help? Call 911 anytime you think you may need emergency care. For example, call if: 
? · You have severe trouble breathing. ?Call your doctor now or seek immediate medical care if: 
? · You cough up blood. ? · You have new or worse trouble breathing. ? · You have a new or higher fever. ? · You have a new rash. ? Watch closely for changes in your health, and be sure to contact your doctor if: 
? · You cough more deeply or more often, especially if you notice more mucus or a change in the color of your mucus. ? · You have new symptoms, such as a sore throat, an earache, or sinus pain. ? · You do not get better as expected. Where can you learn more? Go to http://marylou-burke.info/. Enter D279 in the search box to learn more about \"Cough: Care Instructions. \" Current as of: May 12, 2017 Content Version: 11.4 © 4140-9553 Healthwise, [employer]. Care instructions adapted under license by [employer] (which disclaims liability or warranty for this information). If you have questions about a medical condition or this instruction, always ask your healthcare professional. [address] any warranty or liability for your use of this information. Please provide this summary of care documentation to your next provider. Your primary care clinician is listed as Larry Elliott. If you have any questions after today's visit, please call [phone].

## 2017-12-27 NOTE — PROGRESS NOTES
Chief Complaint   Patient presents with    Cough     1. Have you been to the ER, urgent care clinic since your last visit? Hospitalized since your last visit? No    2. Have you seen or consulted any other health care providers outside of the [address] [place] [address] since your last visit? Include any pap smears or colon screening.  No

## 2017-12-27 NOTE — PROGRESS NOTES
HISTORY OF PRESENT ILLNESS  Nette Mcbride is a 80 y.o. female. This is a patient of Dr. Coby Larry who presents today with her daughter related to cough. The patient has had cough for 3 days. Cough is productive of clear sputum. Patient has been noted by her daughter to have some intermittent wheezing. She has had nasal congestion, as well. She denies chest pain. She has been taking OTC cough syrup without relief. No fever or chills. Visit Vitals    /64 (BP 1 Location: Right arm, BP Patient Position: Sitting)    Pulse 100    Temp 98.3 °F (36.8 °C) (Oral)    Resp 16    Ht 5' 2\" (1.575 m)    Wt 110 lb (49.9 kg)    SpO2 95%    BMI 20.12 kg/m2       HPI    Review of Systems   Constitutional: Negative for chills, fever and malaise/fatigue. HENT: Positive for congestion. Negative for ear pain and sore throat. Respiratory: Positive for cough, sputum production and wheezing. Negative for shortness of breath. Cardiovascular: Negative for chest pain, palpitations and leg swelling. Gastrointestinal: Negative for abdominal pain. Genitourinary: Negative. Musculoskeletal: Negative. Skin: Negative. Neurological: Negative for dizziness, weakness and headaches. Endo/Heme/Allergies: Negative. Psychiatric/Behavioral: Negative. Physical Exam   Constitutional: She appears well-developed and well-nourished. No distress. HENT:   Head: Normocephalic and atraumatic. Mouth/Throat: Oropharynx is clear and moist. No oropharyngeal exudate. Moderate nasal congestion   Cardiovascular: Normal rate and regular rhythm. Pulmonary/Chest: Effort normal. No respiratory distress. She has wheezes. She has no rales. Few scattered expiratory wheezes   Musculoskeletal: She exhibits no edema. Lymphadenopathy:     She has no cervical adenopathy. Neurological: She is alert. Skin: Skin is warm and dry. Psychiatric: She has a normal mood and affect.  Her behavior is normal.   Nursing note and vitals reviewed. ASSESSMENT and PLAN    ICD-10-CM ICD-9-CM    1. Bronchitis J40 490 Will order  doxycycline (ADOXA) 100 mg tablet, 1 tab po bid x 10 days. Will order  predniSONE (DELTASONE) 5 mg tablet, 1 tab po bid x 7 days. Chest x-ray declined at time of visit. Patient encouraged to call or return to office if symptoms do not improve or worsen. If experiencing severe shortness of breath or chest pain, present to the ER. Lab results and schedule of future lab studies reviewed with patient  Reviewed diet, exercise and weight control  Reviewed medications and side effects in detail  Patient encouraged to call or return to office if symptoms do not improve or worsen. Reviewed plan of care with patient who acknowledges understanding and agrees.

## 2017-12-27 NOTE — PATIENT INSTRUCTIONS

## 2018-01-03 RX ORDER — AMLODIPINE BESYLATE 10 MG/1
TABLET ORAL
Qty: 30 TAB | Refills: 0 | Status: SHIPPED | OUTPATIENT
Start: 2018-01-03 | End: 2018-02-03 | Stop reason: SDUPTHER

## 2018-01-15 RX ORDER — CLONIDINE HYDROCHLORIDE 0.1 MG/1
TABLET ORAL
Qty: 30 TAB | Refills: 0 | Status: SHIPPED | OUTPATIENT
Start: 2018-01-15 | End: 2018-02-19 | Stop reason: SDUPTHER

## 2018-02-02 ENCOUNTER — OFFICE VISIT (OUTPATIENT)
Dept: INTERNAL MEDICINE CLINIC | Age: 83
End: 2018-02-02

## 2018-02-02 VITALS
OXYGEN SATURATION: 97 % | DIASTOLIC BLOOD PRESSURE: 72 MMHG | WEIGHT: 109 LBS | HEART RATE: 98 BPM | RESPIRATION RATE: 19 BRPM | BODY MASS INDEX: 20.06 KG/M2 | SYSTOLIC BLOOD PRESSURE: 126 MMHG | HEIGHT: 62 IN

## 2018-02-02 DIAGNOSIS — I69.319 CVA, OLD, COGNITIVE DEFICITS: ICD-10-CM

## 2018-02-02 DIAGNOSIS — E11.9 TYPE 2 DIABETES MELLITUS WITHOUT COMPLICATION, WITHOUT LONG-TERM CURRENT USE OF INSULIN (HCC): Primary | ICD-10-CM

## 2018-02-02 DIAGNOSIS — G47.00 INSOMNIA, UNSPECIFIED TYPE: ICD-10-CM

## 2018-02-02 DIAGNOSIS — F32.A DEPRESSION, UNSPECIFIED DEPRESSION TYPE: ICD-10-CM

## 2018-02-02 DIAGNOSIS — D64.9 CHRONIC ANEMIA: ICD-10-CM

## 2018-02-02 DIAGNOSIS — E78.00 HYPERCHOLESTEROLEMIA: ICD-10-CM

## 2018-02-02 DIAGNOSIS — I10 ESSENTIAL HYPERTENSION: ICD-10-CM

## 2018-02-02 RX ORDER — ASPIRIN 81 MG/1
81 TABLET ORAL DAILY
Qty: 30 TAB | Refills: 99
Start: 2018-02-02 | End: 2018-03-28 | Stop reason: SDUPTHER

## 2018-02-02 RX ORDER — MIRTAZAPINE 7.5 MG/1
7.5 TABLET, FILM COATED ORAL
Qty: 30 TAB | Refills: 5 | Status: SHIPPED | OUTPATIENT
Start: 2018-02-02 | End: 2018-03-28 | Stop reason: SDUPTHER

## 2018-02-02 NOTE — MR AVS SNAPSHOT
[address] [place] [address] [address] 
[phone] Patient: Micah Valencia MRN: [id number] Visit Information Date & Time Provider Department Dept. Phone Encounter #  
 2/2/2018  3:15 PM [facility] [phone] [id number] Follow-up Instructions Return in about 1 month (around 3/2/2018). Upcoming Health Maintenance Date Due OSTEOPOROSIS SCREENING (DEXA) 11/14/1996 EYE EXAM RETINAL OR DILATED Q1 8/11/2017 HEMOGLOBIN A1C Q6M 5/24/2018 MICROALBUMIN Q1 6/9/2018 MEDICARE YEARLY EXAM 6/17/2018 GLAUCOMA SCREENING Q2Y 8/11/2018 LIPID PANEL Q1 11/24/2018 FOOT EXAM Q1 12/1/2018 DTaP/Tdap/Td series (2 - Td) 6/16/2027 Allergies as of 2/2/2018  Review Complete On: 2/2/2018 By: Garry Nguyen, DO No Known Allergies Current Immunizations  Reviewed on 6/16/2017 Name Date Influenza High Dose Vaccine PF 12/1/2017, 12/16/2016, 11/27/2015 12:09 PM  
 Influenza Vaccine 11/14/2014 Pneumococcal Conjugate (PCV-13) 11/27/2015 12:10 PM  
 Pneumococcal Polysaccharide (PPSV-23) 6/16/2017 Not reviewed this visit You Were Diagnosed With   
  
 Codes Comments Type 2 diabetes mellitus without complication, without long-term current use of insulin (HCC)    -  Primary ICD-10-CM: E11.9 ICD-9-CM: 250.00 Essential hypertension     ICD-10-CM: I10 
ICD-9-CM: 401.9 Hypercholesterolemia     ICD-10-CM: E78.00 ICD-9-CM: 272.0   
 CVA, old, cognitive deficits     ICD-10-CM: I69.319 ICD-9-CM: 438.0 Insomnia, unspecified type     ICD-10-CM: G47.00 ICD-9-CM: 780.52 Chronic anemia     ICD-10-CM: D64.9 ICD-9-CM: 033. 9 Vitals BP Pulse Resp Height(growth percentile) Weight(growth percentile) SpO2  
 126/72 (BP 1 Location: Left arm, BP Patient Position: Sitting) 98 19 5' 2\" (1.575 m) 109 lb (49.4 kg) 97% BMI OB Status Smoking Status 19.94 kg/m2 Hysterectomy Former Smoker Vitals History BMI and BSA Data Body Mass Index Body Surface Area 19.94 kg/m 2 1.47 m 2 Preferred Pharmacy Pharmacy Name Phone [address] [facility], [address] [place] [address] [phone] Your Updated Medication List  
  
   
This list is accurate as of: 2/2/18  3:38 PM.  Always use your most recent med list.  
  
  
  
  
 acetaminophen 650 mg Tab Take 650 mg by mouth every six (6) hours as needed. amLODIPine 10 mg tablet Commonly known as:  Deborha Cords TAKE ONE TABLET BY MOUTH EVERY DAY  
  
 aspirin delayed-release 81 mg tablet Take 1 Tab by mouth daily. atorvastatin 40 mg tablet Commonly known as:  LIPITOR  
TAKE ONE TABLET BY MOUTH EVERY DAY  
  
 cloNIDine HCl 0.1 mg tablet Commonly known as:  CATAPRES  
TAKE ONE TABLET BY MOUTH AT BEDTIME  
  
 ferrous sulfate 325 mg (65 mg iron) tablet Commonly known as:  Iron (Ferrous Sulfate) Take 1 Tab by mouth Daily (before breakfast). losartan-hydroCHLOROthiazide 100-25 mg per tablet Commonly known as:  HYZAAR  
TAKE ONE TABLET BY MOUTH EVERY DAY  
  
 metFORMIN 500 mg tablet Commonly known as:  GLUCOPHAGE  
TAKE ONE TABLET BY MOUTH TWO TIMES A DAY WITH MEALS  
  
 mirtazapine 7.5 mg tablet Commonly known as:  Luwanna Nares Take 1 Tab by mouth nightly. potassium chloride 10 mEq tablet Commonly known as:  KLOR-CON  
TAKE TWO TABLETS BY MOUTH EVERY DAY Prescriptions Sent to Pharmacy Refills  
 mirtazapine (REMERON) 7.5 mg tablet 5 Sig: Take 1 Tab by mouth nightly. Class: Normal  
 Pharmacy: [address] [place] [address] #: [phone] Route: Oral  
  
Follow-up Instructions Return in about 1 month (around 3/2/2018). Please provide this summary of care documentation to your next provider. Your primary care clinician is listed as Nayeil Souza.  If you have any questions after today's visit, please call [phone].

## 2018-02-02 NOTE — PROGRESS NOTES
HISTORY OF PRESENT ILLNESS  Emeli Neal is a 80 y.o. female. Pt. comes in for f/u. Has multiple medical problems. BP and DM are stable. Her daughter reports patient having occasional confusion. She reports feeling just fine. Reports having poor appetite. Has lost a couple pounds over last few months. Also some difficulty sleeping at night. Reports compliance with medications and diet. Med list and most recent labs/studies reviewed with pt. Trying to be active physically as tolerated. No tobacco or alcohol use. Slow gait but no falls. Reports no other new c/o. Altered mental status    Her past medical history is significant for hypertension. Hypertension    Associated symptoms include blurred vision. Pertinent negatives include no chest pain, no headaches, no dizziness and no shortness of breath. Diabetes   Pertinent negatives include no chest pain, no abdominal pain, no headaches and no shortness of breath. Review of Systems   Constitutional: Negative. HENT: Negative. Eyes: Positive for blurred vision. Respiratory: Negative for shortness of breath. Cardiovascular: Negative for chest pain and leg swelling. Gastrointestinal: Negative for abdominal pain. Genitourinary: Negative for dysuria and frequency. Musculoskeletal: Positive for back pain and joint pain. Negative for falls. Skin: Negative. Neurological: Negative for dizziness, sensory change, focal weakness and headaches. Psychiatric/Behavioral: Positive for depression and memory loss. Negative for suicidal ideas. The patient is nervous/anxious and has insomnia. All other systems reviewed and are negative. Physical Exam   Constitutional: She is oriented to person, place, and time. She appears well-developed and well-nourished. No distress. HENT:   Head: Normocephalic and atraumatic. Mouth/Throat: Oropharynx is clear and moist.   Eyes: Conjunctivae are normal.   Neck: Normal range of motion.  Neck supple. No JVD present. No thyromegaly present. Cardiovascular: Normal rate, regular rhythm, normal heart sounds and intact distal pulses. No murmur heard. Pulmonary/Chest: Effort normal and breath sounds normal. No respiratory distress. She has no wheezes. She has no rales. Abdominal: Soft. Bowel sounds are normal. She exhibits no distension. Musculoskeletal: She exhibits no edema or tenderness. djd   Neurological: She is alert and oriented to person, place, and time. Coordination normal.   A+O to name and Romain  Doesn't know date or president's name   Skin: Skin is warm and dry. Psychiatric: She has a normal mood and affect. Her behavior is normal.   Nursing note and vitals reviewed. ASSESSMENT and PLAN  Diagnoses and all orders for this visit:    1. Type 2 diabetes mellitus without complication, without long-term current use of insulin ([facility] [address]    2. Essential hypertension    3. Hypercholesterolemia    4. CVA, old, cognitive deficits    5. Insomnia, unspecified type    6. Chronic anemia    7. Depression    Other orders  -    Start mirtazapine (REMERON) 7.5 mg tablet; Take 1 Tab by mouth nightly. -     aspirin delayed-release 81 mg tablet; Take 1 Tab by mouth daily. Follow-up Disposition:  Return in about 1 month (around 3/2/2018).    lab results and schedule of future lab studies reviewed with patient  reviewed diet, exercise and weight control  reviewed medications and side effects in detail  low cholesterol diet, weight control and daily exercise discussed, home glucose monitoring emphasized, all medications, side effects and compliance discussed carefully, foot care discussed and Podiatry visits discussed, annual eye examinations at Ophthalmology discussed, glycohemoglobin and other lab monitoring discussed and long term diabetic complications discussed  Reassurance that everything seems stable

## 2018-02-02 NOTE — PROGRESS NOTES
Health Maintenance Due   Topic Date Due    OSTEOPOROSIS SCREENING (DEXA)  11/14/1996    EYE EXAM RETINAL OR DILATED Q1  08/11/2017       Chief Complaint   Patient presents with    Altered mental status     started 1/12    Hypertension    Diabetes    Decreased Appetite       1. Have you been to the ER, urgent care clinic since your last visit? Hospitalized since your last visit? No    2. Have you seen or consulted any other health care providers outside of the [address] [place] [address] since your last visit? Include any pap smears or colon screening. No    3) Do you have an Advance Directive on file? no    4) Are you interested in receiving information on Advance Directives? NO      Patient is accompanied by self I have received verbal consent from Vicki Weber to discuss any/all medical information while they are present in the room.

## 2018-02-05 RX ORDER — AMLODIPINE BESYLATE 10 MG/1
TABLET ORAL
Qty: 30 TAB | Refills: 0 | Status: SHIPPED | OUTPATIENT
Start: 2018-02-05 | End: 2018-03-12 | Stop reason: SDUPTHER

## 2018-02-19 RX ORDER — CLONIDINE HYDROCHLORIDE 0.1 MG/1
TABLET ORAL
Qty: 30 TAB | Refills: 0 | Status: SHIPPED | OUTPATIENT
Start: 2018-02-19 | End: 2018-03-26 | Stop reason: SDUPTHER

## 2018-03-07 RX ORDER — LANOLIN ALCOHOL/MO/W.PET/CERES
CREAM (GRAM) TOPICAL
Qty: 30 TAB | Refills: 0 | Status: SHIPPED | OUTPATIENT
Start: 2018-03-07 | End: 2018-03-28 | Stop reason: SDUPTHER

## 2018-03-12 RX ORDER — AMLODIPINE BESYLATE 10 MG/1
TABLET ORAL
Qty: 30 TAB | Refills: 0 | Status: SHIPPED | OUTPATIENT
Start: 2018-03-12 | End: 2018-03-28 | Stop reason: SDUPTHER

## 2018-03-26 RX ORDER — CLONIDINE HYDROCHLORIDE 0.1 MG/1
TABLET ORAL
Qty: 30 TAB | Refills: 0 | Status: SHIPPED | OUTPATIENT
Start: 2018-03-26 | End: 2018-03-28 | Stop reason: SDUPTHER

## 2018-03-26 RX ORDER — METFORMIN HYDROCHLORIDE 500 MG/1
TABLET ORAL
Qty: 60 TAB | Refills: 0 | Status: SHIPPED | OUTPATIENT
Start: 2018-03-26 | End: 2018-03-28 | Stop reason: SDUPTHER

## 2018-04-24 RX ORDER — ATORVASTATIN CALCIUM 40 MG/1
TABLET, FILM COATED ORAL
Qty: 90 TAB | Refills: 0 | Status: CANCELLED | OUTPATIENT
Start: 2018-04-24

## 2018-04-28 LAB
ALT SERPL-CCNC: 6 IU/L (ref 0–32)
AST SERPL-CCNC: 9 IU/L (ref 0–40)
BASOPHILS # BLD AUTO: 0 X10E3/UL (ref 0–0.2)
BASOPHILS NFR BLD AUTO: 0 %
BUN SERPL-MCNC: 14 MG/DL (ref 8–27)
BUN/CREAT SERPL: 14 (ref 12–28)
CALCIUM SERPL-MCNC: 9.7 MG/DL (ref 8.7–10.3)
CHLORIDE SERPL-SCNC: 103 MMOL/L (ref 96–106)
CHOLEST SERPL-MCNC: 121 MG/DL (ref 100–199)
CO2 SERPL-SCNC: 21 MMOL/L (ref 18–29)
CREAT SERPL-MCNC: 0.97 MG/DL (ref 0.57–1)
EOSINOPHIL # BLD AUTO: 0.1 X10E3/UL (ref 0–0.4)
EOSINOPHIL NFR BLD AUTO: 2 %
ERYTHROCYTE [DISTWIDTH] IN BLOOD BY AUTOMATED COUNT: 14.4 % (ref 12.3–15.4)
EST. AVERAGE GLUCOSE BLD GHB EST-MCNC: 151 MG/DL
GFR SERPLBLD CREATININE-BSD FMLA CKD-EPI: 53 ML/MIN/1.73
GFR SERPLBLD CREATININE-BSD FMLA CKD-EPI: 61 ML/MIN/1.73
GLUCOSE SERPL-MCNC: 116 MG/DL (ref 65–99)
HBA1C MFR BLD: 6.9 % (ref 4.8–5.6)
HCT VFR BLD AUTO: 31.1 % (ref 34–46.6)
HDLC SERPL-MCNC: 45 MG/DL
HGB BLD-MCNC: 10.5 G/DL (ref 11.1–15.9)
IMM GRANULOCYTES # BLD: 0 X10E3/UL (ref 0–0.1)
IMM GRANULOCYTES NFR BLD: 0 %
INTERPRETATION, 910389: NORMAL
INTERPRETATION: NORMAL
LDLC SERPL CALC-MCNC: 59 MG/DL (ref 0–99)
LYMPHOCYTES # BLD AUTO: 1.1 X10E3/UL (ref 0.7–3.1)
LYMPHOCYTES NFR BLD AUTO: 22 %
Lab: NORMAL
MCH RBC QN AUTO: 30.1 PG (ref 26.6–33)
MCHC RBC AUTO-ENTMCNC: 33.8 G/DL (ref 31.5–35.7)
MCV RBC AUTO: 89 FL (ref 79–97)
MONOCYTES # BLD AUTO: 0.3 X10E3/UL (ref 0.1–0.9)
MONOCYTES NFR BLD AUTO: 6 %
NEUTROPHILS # BLD AUTO: 3.6 X10E3/UL (ref 1.4–7)
NEUTROPHILS NFR BLD AUTO: 70 %
PDF IMAGE, 910387: NORMAL
PLATELET # BLD AUTO: 380 X10E3/UL (ref 150–379)
POTASSIUM SERPL-SCNC: 3.3 MMOL/L (ref 3.5–5.2)
RBC # BLD AUTO: 3.49 X10E6/UL (ref 3.77–5.28)
SODIUM SERPL-SCNC: 142 MMOL/L (ref 134–144)
TRIGL SERPL-MCNC: 87 MG/DL (ref 0–149)
VLDLC SERPL CALC-MCNC: 17 MG/DL (ref 5–40)
WBC # BLD AUTO: 5.1 X10E3/UL (ref 3.4–10.8)

## 2018-05-01 DIAGNOSIS — E11.9 TYPE 2 DIABETES MELLITUS WITHOUT COMPLICATION, WITHOUT LONG-TERM CURRENT USE OF INSULIN (HCC): ICD-10-CM

## 2018-05-04 ENCOUNTER — OFFICE VISIT (OUTPATIENT)
Dept: INTERNAL MEDICINE CLINIC | Age: 83
End: 2018-05-04

## 2018-05-04 VITALS
RESPIRATION RATE: 19 BRPM | WEIGHT: 105 LBS | HEART RATE: 80 BPM | HEIGHT: 62 IN | OXYGEN SATURATION: 98 % | BODY MASS INDEX: 19.32 KG/M2 | DIASTOLIC BLOOD PRESSURE: 64 MMHG | SYSTOLIC BLOOD PRESSURE: 115 MMHG

## 2018-05-04 DIAGNOSIS — D64.9 CHRONIC ANEMIA: ICD-10-CM

## 2018-05-04 DIAGNOSIS — E11.9 TYPE 2 DIABETES MELLITUS WITHOUT COMPLICATION, WITHOUT LONG-TERM CURRENT USE OF INSULIN (HCC): Primary | ICD-10-CM

## 2018-05-04 DIAGNOSIS — I10 ESSENTIAL HYPERTENSION: ICD-10-CM

## 2018-05-04 DIAGNOSIS — R41.3 MEMORY IMPAIRMENT: ICD-10-CM

## 2018-05-04 DIAGNOSIS — E78.00 HYPERCHOLESTEROLEMIA: ICD-10-CM

## 2018-05-04 DIAGNOSIS — I69.319 CVA, OLD, COGNITIVE DEFICITS: ICD-10-CM

## 2018-05-04 RX ORDER — LANOLIN ALCOHOL/MO/W.PET/CERES
CREAM (GRAM) TOPICAL
Qty: 90 TAB | Refills: 2 | Status: SHIPPED | OUTPATIENT
Start: 2018-05-04 | End: 2018-05-04 | Stop reason: SDUPTHER

## 2018-05-04 RX ORDER — ASPIRIN 81 MG/1
81 TABLET ORAL DAILY
Qty: 90 TAB | Refills: 2 | Status: SHIPPED | OUTPATIENT
Start: 2018-05-04 | End: 2018-05-04 | Stop reason: SDUPTHER

## 2018-05-04 RX ORDER — LANOLIN ALCOHOL/MO/W.PET/CERES
CREAM (GRAM) TOPICAL
Qty: 90 TAB | Refills: 2 | Status: SHIPPED | OUTPATIENT
Start: 2018-05-04 | End: 2018-05-10 | Stop reason: SDUPTHER

## 2018-05-04 RX ORDER — AMLODIPINE BESYLATE 10 MG/1
TABLET ORAL
Qty: 90 TAB | Refills: 2 | Status: SHIPPED | OUTPATIENT
Start: 2018-05-04 | End: 2018-06-19 | Stop reason: SDUPTHER

## 2018-05-04 RX ORDER — POTASSIUM CHLORIDE 20 MEQ/1
TABLET, EXTENDED RELEASE ORAL
Qty: 90 TAB | Refills: 2 | Status: SHIPPED | OUTPATIENT
Start: 2018-05-04 | End: 2018-06-19 | Stop reason: SDUPTHER

## 2018-05-04 RX ORDER — ATORVASTATIN CALCIUM 40 MG/1
TABLET, FILM COATED ORAL
Qty: 90 TAB | Refills: 2 | Status: SHIPPED | OUTPATIENT
Start: 2018-05-04 | End: 2018-06-13 | Stop reason: SDUPTHER

## 2018-05-04 RX ORDER — POTASSIUM CHLORIDE 20 MEQ/1
TABLET, EXTENDED RELEASE ORAL
Qty: 90 TAB | Refills: 2 | Status: SHIPPED | OUTPATIENT
Start: 2018-05-04 | End: 2018-05-04 | Stop reason: SDUPTHER

## 2018-05-04 RX ORDER — AMLODIPINE BESYLATE 10 MG/1
TABLET ORAL
Qty: 90 TAB | Refills: 2 | Status: SHIPPED | OUTPATIENT
Start: 2018-05-04 | End: 2018-05-04 | Stop reason: SDUPTHER

## 2018-05-04 RX ORDER — CLONIDINE HYDROCHLORIDE 0.1 MG/1
TABLET ORAL
Qty: 90 TAB | Refills: 2 | Status: SHIPPED | OUTPATIENT
Start: 2018-05-04 | End: 2018-06-19 | Stop reason: SDUPTHER

## 2018-05-04 RX ORDER — CLONIDINE HYDROCHLORIDE 0.1 MG/1
TABLET ORAL
Qty: 90 TAB | Refills: 2 | Status: SHIPPED | OUTPATIENT
Start: 2018-05-04 | End: 2018-05-04 | Stop reason: SDUPTHER

## 2018-05-04 RX ORDER — MIRTAZAPINE 7.5 MG/1
7.5 TABLET, FILM COATED ORAL
Qty: 90 TAB | Refills: 2 | Status: SHIPPED | OUTPATIENT
Start: 2018-05-04 | End: 2018-05-04 | Stop reason: SDUPTHER

## 2018-05-04 RX ORDER — ASPIRIN 81 MG/1
81 TABLET ORAL DAILY
Qty: 90 TAB | Refills: 2 | Status: SHIPPED | OUTPATIENT
Start: 2018-05-04 | End: 2019-07-29 | Stop reason: SDUPTHER

## 2018-05-04 RX ORDER — ATORVASTATIN CALCIUM 40 MG/1
TABLET, FILM COATED ORAL
Qty: 90 TAB | Refills: 2 | Status: SHIPPED | OUTPATIENT
Start: 2018-05-04 | End: 2018-05-04 | Stop reason: SDUPTHER

## 2018-05-04 RX ORDER — LOSARTAN POTASSIUM AND HYDROCHLOROTHIAZIDE 25; 100 MG/1; MG/1
TABLET ORAL
Qty: 90 TAB | Refills: 2 | Status: SHIPPED | OUTPATIENT
Start: 2018-05-04 | End: 2018-05-04 | Stop reason: SDUPTHER

## 2018-05-04 RX ORDER — MIRTAZAPINE 7.5 MG/1
7.5 TABLET, FILM COATED ORAL
Qty: 90 TAB | Refills: 2 | Status: SHIPPED | OUTPATIENT
Start: 2018-05-04 | End: 2018-06-19 | Stop reason: SDUPTHER

## 2018-05-04 RX ORDER — METFORMIN HYDROCHLORIDE 500 MG/1
TABLET ORAL
Qty: 180 TAB | Refills: 2 | Status: SHIPPED | OUTPATIENT
Start: 2018-05-04 | End: 2018-05-10 | Stop reason: SDUPTHER

## 2018-05-04 RX ORDER — METFORMIN HYDROCHLORIDE 500 MG/1
TABLET ORAL
Qty: 180 TAB | Refills: 2 | Status: SHIPPED | OUTPATIENT
Start: 2018-05-04 | End: 2018-05-04 | Stop reason: SDUPTHER

## 2018-05-04 RX ORDER — LOSARTAN POTASSIUM AND HYDROCHLOROTHIAZIDE 25; 100 MG/1; MG/1
TABLET ORAL
Qty: 90 TAB | Refills: 2 | Status: SHIPPED | OUTPATIENT
Start: 2018-05-04 | End: 2018-06-13 | Stop reason: SDUPTHER

## 2018-05-04 NOTE — MR AVS SNAPSHOT
[address] [place] [address] [address] 
[phone] Patient: Prashanth Stone MRN: [id number] HEL:[id number] Visit Information Date & Time Provider Department Dept. Phone Encounter #  
 5/4/2018 10:45 AM [facility] Internal Medicine [phone] [id number] Follow-up Instructions Return in about 6 months (around 11/4/2018). Upcoming Health Maintenance Date Due Bone Densitometry (Dexa) Screening 11/14/1996 EYE EXAM RETINAL OR DILATED Q1 8/11/2017 MICROALBUMIN Q1 6/9/2018 MEDICARE YEARLY EXAM 6/17/2018 Influenza Age 5 to Adult 8/1/2018 GLAUCOMA SCREENING Q2Y 8/11/2018 HEMOGLOBIN A1C Q6M 10/27/2018 FOOT EXAM Q1 12/1/2018 LIPID PANEL Q1 4/27/2019 DTaP/Tdap/Td series (2 - Td) 6/16/2027 Allergies as of 5/4/2018  Review Complete On: 5/4/2018 By: Dominic Bangura,  No Known Allergies Current Immunizations  Reviewed on 6/16/2017 Name Date Influenza High Dose Vaccine PF 12/1/2017, 12/16/2016, 11/27/2015 12:09 PM  
 Influenza Vaccine 11/14/2014 Pneumococcal Conjugate (PCV-13) 11/27/2015 12:10 PM  
 Pneumococcal Polysaccharide (PPSV-23) 6/16/2017 Not reviewed this visit You Were Diagnosed With   
  
 Codes Comments Type 2 diabetes mellitus without complication, without long-term current use of insulin (HCC)    -  Primary ICD-10-CM: E11.9 ICD-9-CM: 250.00 Essential hypertension     ICD-10-CM: I10 
ICD-9-CM: 401.9   
 CVA, old, cognitive deficits     ICD-10-CM: I69.319 ICD-9-CM: 438.0 Chronic anemia     ICD-10-CM: D64.9 ICD-9-CM: 285.9 Memory impairment     ICD-10-CM: R41.3 ICD-9-CM: 780.93 Hypercholesterolemia     ICD-10-CM: E78.00 ICD-9-CM: 272.0 Vitals BP Pulse Resp Height(growth percentile) Weight(growth percentile) SpO2  
 115/64 (BP 1 Location: Left arm, BP Patient Position: Sitting) 80 19 5' 2\" (1.575 m) 105 lb (47.6 kg) 98% BMI OB Status Smoking Status 19.2 kg/m2 Hysterectomy Former Smoker Vitals History BMI and BSA Data Body Mass Index Body Surface Area  
 19.2 kg/m 2 1.44 m 2 Preferred Pharmacy Pharmacy Name Phone [facility] [address] [facility], [address] [place] [address],[facility] Floor [phone] Your Updated Medication List  
  
   
This list is accurate as of 5/4/18 11:19 AM.  Always use your most recent med list.  
  
  
  
  
 acetaminophen 650 mg Tab Take 650 mg by mouth every six (6) hours as needed. amLODIPine 10 mg tablet Commonly known as:  Nevaeh Chimes TAKE ONE TABLET BY MOUTH EVERY DAY  
  
 aspirin delayed-release 81 mg tablet Take 1 Tab by mouth daily. atorvastatin 40 mg tablet Commonly known as:  LIPITOR  
TAKE ONE TABLET BY MOUTH EVERY DAY  
  
 cloNIDine HCl 0.1 mg tablet Commonly known as:  CATAPRES  
TAKE ONE TABLET BY MOUTH AT BEDTIME  
  
 ferrous sulfate 325 mg (65 mg iron) tablet TAKE ONE TABLET BY MOUTH EVERY MORNING BEFORE BREAKFAST  
  
 geriatric multivitamins & minerals Elix Commonly known as:  ELDERTONIC Take 15 mL by mouth Before breakfast, lunch, and dinner. losartan-hydroCHLOROthiazide 100-25 mg per tablet Commonly known as:  HYZAAR  
TAKE ONE TABLET BY MOUTH EVERY DAY  
  
 metFORMIN 500 mg tablet Commonly known as:  GLUCOPHAGE  
TAKE ONE TABLET BY MOUTH TWO TIMES A DAY WITH MEALS  
  
 mirtazapine 7.5 mg tablet Commonly known as:  Phyllistine Bright Take 1 Tab by mouth nightly. potassium chloride 20 mEq tablet Commonly known as:  K-DUR, KLOR-CON  
TAKE TWO TABLETS BY MOUTH EVERY DAY Prescriptions Printed Refills  
 amLODIPine (NORVASC) 10 mg tablet 2 Sig: TAKE ONE TABLET BY MOUTH EVERY DAY Class: Print  
 aspirin delayed-release 81 mg tablet 2 Sig: Take 1 Tab by mouth daily. Class: Print  Route: Oral  
 atorvastatin (LIPITOR) 40 mg tablet 2  
 Sig: TAKE ONE TABLET BY MOUTH EVERY DAY Class: Print  
 cloNIDine HCl (CATAPRES) 0.1 mg tablet 2 Sig: TAKE ONE TABLET BY MOUTH AT BEDTIME Class: Print  
 ferrous sulfate 325 mg (65 mg iron) tablet 2 Sig: TAKE ONE TABLET BY MOUTH EVERY MORNING BEFORE BREAKFAST Class: Print  
 losartan-hydroCHLOROthiazide (HYZAAR) 100-25 mg per tablet 2 Sig: TAKE ONE TABLET BY MOUTH EVERY DAY Class: Print  
 metFORMIN (GLUCOPHAGE) 500 mg tablet 2 Sig: TAKE ONE TABLET BY MOUTH TWO TIMES A DAY WITH MEALS Class: Print  
 mirtazapine (REMERON) 7.5 mg tablet 2 Sig: Take 1 Tab by mouth nightly. Class: Print Route: Oral  
 potassium chloride (K-DUR, KLOR-CON) 20 mEq tablet 2 Sig: TAKE TWO TABLETS BY MOUTH EVERY DAY Class: Print Prescriptions Sent to Pharmacy Refills  
 geriatric multivitamins & minerals (ELDERTONIC) elix 5 Sig: Take 15 mL by mouth Before breakfast, lunch, and dinner. Class: Normal  
 Pharmacy: [facility] [address] ROSALIE DAVID [address] [place] [address], [address] [place] [address],[address]  #: [phone] Route: Oral  
  
Follow-up Instructions Return in about 6 months (around 11/4/2018). To-Do List   
 10/31/2018 Lab:  ALT   
  
 10/31/2018 Lab:  AST   
  
 10/31/2018 Lab:  CBC WITH AUTOMATED DIFF   
  
 10/31/2018 Lab:  LIPID PANEL   
  
 10/31/2018 Lab:  METABOLIC PANEL, BASIC   
  
 11/04/2018 Lab:  HEMOGLOBIN A1C WITH EAG   
  
 11/04/2018 Lab:  MICROALBUMIN, UR, RAND W/ MICROALB/CREAT RATIO Please provide this summary of care documentation to your next provider. Your primary care clinician is listed as Jeremy Macedo. If you have any questions after today's visit, please call [phone].

## 2018-05-04 NOTE — PROGRESS NOTES
Health Maintenance Due   Topic Date Due    Bone Densitometry (Dexa) Screening  11/14/1996    EYE EXAM RETINAL OR DILATED Q1  08/11/2017       Chief Complaint   Patient presents with    Hypertension    Diabetes    Cholesterol Problem    Follow Up Chronic Condition       1. Have you been to the ER, urgent care clinic since your last visit? Hospitalized since your last visit? No    2. Have you seen or consulted any other health care providers outside of the [address] [place] [address] since your last visit? Include any pap smears or colon screening. No    3) Do you have an Advance Directive on file? no    4) Are you interested in receiving information on Advance Directives? NO      Patient is accompanied by self I have received verbal consent from Deborah Pandya to discuss any/all medical information while they are present in the room.

## 2018-05-10 RX ORDER — LANOLIN ALCOHOL/MO/W.PET/CERES
CREAM (GRAM) TOPICAL
Qty: 90 TAB | Refills: 2 | Status: SHIPPED | OUTPATIENT
Start: 2018-05-10 | End: 2018-06-19 | Stop reason: SDUPTHER

## 2018-05-10 RX ORDER — METFORMIN HYDROCHLORIDE 500 MG/1
TABLET ORAL
Qty: 180 TAB | Refills: 2 | Status: SHIPPED | OUTPATIENT
Start: 2018-05-10 | End: 2018-06-19 | Stop reason: SDUPTHER

## 2018-05-10 NOTE — TELEPHONE ENCOUNTER
Patient wants to use [facility] and not [facility] (she only wants the 3 of these Rx's for now.

## 2018-05-11 ENCOUNTER — TELEPHONE (OUTPATIENT)
Dept: INTERNAL MEDICINE CLINIC | Age: 83
End: 2018-05-11

## 2018-05-11 NOTE — TELEPHONE ENCOUNTER
Spoke to patient's daughter, Elisabet Jolley, yesterday.   Refill request sent to provider for approval.

## 2018-05-11 NOTE — TELEPHONE ENCOUNTER
----- Message from Carlota Zamorano sent at 5/9/2018  4:52 PM EDT -----  Regarding: FW: /Refill      ----- Message -----     From: Aj Bautista     Sent: 5/9/2018   3:55 PM       To: Emily Davila [employer]  Subject: /Refill                               Pt's daughter Pamela black would like a return call. She said he mother uses a home delivery pharmacy for all Rx's besides one, and all of the pt's Rx's from recent visit  have been sent to [facility] in error. She would like it corrected     Best contact for Mrs. Germain Keenan [phone]

## 2018-05-29 NOTE — PROGRESS NOTES
HISTORY OF PRESENT ILLNESS  Pavan Jiménez is a 80 y.o. female. Pt. comes in for f/u. Has multiple medical problems. BP and DM are stable. Has chronic arthritic pains. Slow gait but no falls. Lives with her daughter. Daughter reports some memory issues and depression although patient does not totally agree. She has had an old CVA. Reports compliance with medications and diet. Med list and most recent labs/studies reviewed with pt. Trying to be active physically as tolerated. Needs med refills. No tobacco or alcohol use. Reports no other new c/o. Hypertension    Associated symptoms include blurred vision. Pertinent negatives include no chest pain, no headaches, no dizziness and no shortness of breath. Diabetes   Pertinent negatives include no chest pain, no abdominal pain, no headaches and no shortness of breath. Cholesterol Problem   Pertinent negatives include no chest pain, no abdominal pain, no headaches and no shortness of breath. Follow Up Chronic Condition   Pertinent negatives include no chest pain, no abdominal pain, no headaches and no shortness of breath. Memory Loss    Her past medical history is significant for hypertension. Review of Systems   Constitutional: Negative. HENT: Negative. Eyes: Positive for blurred vision. Respiratory: Negative for shortness of breath. Cardiovascular: Negative for chest pain and leg swelling. Gastrointestinal: Negative for abdominal pain. Genitourinary: Negative for dysuria and frequency. Musculoskeletal: Positive for back pain and joint pain. Negative for falls. Skin: Negative. Neurological: Negative for dizziness, sensory change, focal weakness and headaches. Psychiatric/Behavioral: Positive for depression and memory loss. Negative for suicidal ideas. The patient is nervous/anxious and has insomnia. All other systems reviewed and are negative.       Physical Exam   Constitutional: She is oriented to person, place, and time. She appears well-developed and well-nourished. No distress. HENT:   Head: Normocephalic and atraumatic. Mouth/Throat: Oropharynx is clear and moist.   Eyes: Conjunctivae are normal.   Neck: Normal range of motion. Neck supple. No JVD present. No thyromegaly present. Cardiovascular: Normal rate, regular rhythm, normal heart sounds and intact distal pulses. No murmur heard. Pulmonary/Chest: Effort normal and breath sounds normal. No respiratory distress. She has no wheezes. She has no rales. Abdominal: Soft. Bowel sounds are normal. She exhibits no distension. Musculoskeletal: She exhibits no edema or tenderness. djd   Neurological: She is alert and oriented to person, place, and time. Coordination normal.   A+O to name and Romain  Doesn't know date or president's name   Skin: Skin is warm and dry. No rash noted. Psychiatric: She has a normal mood and affect. Her behavior is normal.   Nursing note and vitals reviewed. ASSESSMENT and PLAN  Diagnoses and all orders for this visit:    1. Type 2 diabetes mellitus without complication, without long-term current use of insulin ([facility])  -     LIPID PANEL; Future  -     METABOLIC PANEL, BASIC; Future  -     ALT; Future  -     AST; Future  -     CBC WITH AUTOMATED DIFF; Future  -     HEMOGLOBIN A1C WITH EAG; Future  -     MICROALBUMIN, UR, RAND W/ MICROALB/CREAT RATIO; Future    2. Essential hypertension    3. CVA, old, cognitive deficits    4. Chronic anemia  -     CBC WITH AUTOMATED DIFF; Future    5. Memory impairment    6. Hypercholesterolemia    Other orders  -     amLODIPine (NORVASC) 10 mg tablet; TAKE ONE TABLET BY MOUTH EVERY DAY  -     aspirin delayed-release 81 mg tablet; Take 1 Tab by mouth daily.   -     atorvastatin (LIPITOR) 40 mg tablet; TAKE ONE TABLET BY MOUTH EVERY DAY  -     cloNIDine HCl (CATAPRES) 0.1 mg tablet; TAKE ONE TABLET BY MOUTH AT BEDTIME  -     losartan-hydroCHLOROthiazide (HYZAAR) 100-25 mg per tablet; TAKE ONE TABLET BY MOUTH EVERY DAY  -     mirtazapine (REMERON) 7.5 mg tablet; Take 1 Tab by mouth nightly. -     potassium chloride (K-DUR, KLOR-CON) 20 mEq tablet; TAKE TWO TABLETS BY MOUTH EVERY DAY      Follow-up Disposition:  Return in about 6 months (around 11/4/2018).    lab results and schedule of future lab studies reviewed with patient  reviewed diet, exercise and weight control  reviewed medications and side effects in detail  low cholesterol diet, weight control and daily exercise discussed, home glucose monitoring emphasized, all medications, side effects and compliance discussed carefully, foot care discussed and Podiatry visits discussed, annual eye examinations at Ophthalmology discussed, glycohemoglobin and other lab monitoring discussed and long term diabetic complications discussed  F/u with other MD's as scheduled  Overall stable

## 2018-05-30 ENCOUNTER — TELEPHONE (OUTPATIENT)
Dept: INTERNAL MEDICINE CLINIC | Age: 83
End: 2018-05-30

## 2018-05-30 DIAGNOSIS — E11.9 TYPE 2 DIABETES MELLITUS WITHOUT COMPLICATION, WITHOUT LONG-TERM CURRENT USE OF INSULIN (HCC): ICD-10-CM

## 2018-06-06 NOTE — TELEPHONE ENCOUNTER
Message left on Rp's phone. Pt is to take OTC Eldertonic Three times a day for appetite stimulation.

## 2018-06-19 RX ORDER — CLONIDINE HYDROCHLORIDE 0.1 MG/1
TABLET ORAL
Qty: 90 TAB | Refills: 2 | Status: SHIPPED | OUTPATIENT
Start: 2018-06-19 | End: 2019-04-10 | Stop reason: SDUPTHER

## 2018-06-19 RX ORDER — AMLODIPINE BESYLATE 10 MG/1
TABLET ORAL
Qty: 90 TAB | Refills: 2 | Status: SHIPPED | OUTPATIENT
Start: 2018-06-19 | End: 2019-04-10 | Stop reason: SDUPTHER

## 2018-06-19 RX ORDER — LOSARTAN POTASSIUM AND HYDROCHLOROTHIAZIDE 25; 100 MG/1; MG/1
TABLET ORAL
Qty: 90 TAB | Refills: 2 | Status: SHIPPED | OUTPATIENT
Start: 2018-06-19 | End: 2019-07-29 | Stop reason: SDUPTHER

## 2018-06-19 RX ORDER — POTASSIUM CHLORIDE 20 MEQ/1
TABLET, EXTENDED RELEASE ORAL
Qty: 90 TAB | Refills: 2 | Status: SHIPPED | OUTPATIENT
Start: 2018-06-19 | End: 2019-07-29 | Stop reason: SDUPTHER

## 2018-06-19 RX ORDER — LANOLIN ALCOHOL/MO/W.PET/CERES
CREAM (GRAM) TOPICAL
Qty: 90 TAB | Refills: 2 | Status: SHIPPED | OUTPATIENT
Start: 2018-06-19 | End: 2019-05-31 | Stop reason: SDUPTHER

## 2018-06-19 RX ORDER — METFORMIN HYDROCHLORIDE 500 MG/1
TABLET ORAL
Qty: 180 TAB | Refills: 2 | Status: SHIPPED | OUTPATIENT
Start: 2018-06-19 | End: 2019-07-29 | Stop reason: SDUPTHER

## 2018-06-19 RX ORDER — MIRTAZAPINE 7.5 MG/1
7.5 TABLET, FILM COATED ORAL
Qty: 90 TAB | Refills: 2 | Status: SHIPPED | OUTPATIENT
Start: 2018-06-19 | End: 2019-05-23 | Stop reason: SDUPTHER

## 2018-06-19 RX ORDER — ATORVASTATIN CALCIUM 40 MG/1
TABLET, FILM COATED ORAL
Qty: 90 TAB | Refills: 2 | Status: SHIPPED | OUTPATIENT
Start: 2018-06-19 | End: 2019-04-23 | Stop reason: SDUPTHER

## 2018-07-11 ENCOUNTER — TELEPHONE (OUTPATIENT)
Dept: INTERNAL MEDICINE CLINIC | Age: 83
End: 2018-07-11

## 2018-07-18 NOTE — TELEPHONE ENCOUNTER
Spoke to Jorge, patient's daughter, her [facility] can suggest alternate to Chalo Harmon

## 2018-08-02 ENCOUNTER — ANESTHESIA (OUTPATIENT)
Dept: SURGERY | Age: 83
End: 2018-08-02
Payer: MEDICARE

## 2018-08-02 ENCOUNTER — ANESTHESIA EVENT (OUTPATIENT)
Dept: SURGERY | Age: 83
End: 2018-08-02
Payer: MEDICARE

## 2018-08-02 ENCOUNTER — HOSPITAL ENCOUNTER (EMERGENCY)
Age: 83
Discharge: HOME | End: 2018-08-03
Attending: EMERGENCY MEDICINE | Admitting: SPECIALIST
Payer: MEDICARE

## 2018-08-02 ENCOUNTER — APPOINTMENT (OUTPATIENT)
Dept: GENERAL RADIOLOGY | Age: 83
End: 2018-08-02
Attending: EMERGENCY MEDICINE
Payer: MEDICARE

## 2018-08-02 DIAGNOSIS — T18.128A FOOD IMPACTION OF ESOPHAGUS, INITIAL ENCOUNTER: Primary | ICD-10-CM

## 2018-08-02 LAB
ANION GAP SERPL CALC-SCNC: 5 MMOL/L (ref 5–15)
BASOPHILS # BLD: 0 K/UL (ref 0–0.1)
BASOPHILS NFR BLD: 0 % (ref 0–1)
BUN SERPL-MCNC: 13 MG/DL (ref 6–20)
BUN/CREAT SERPL: 13 (ref 12–20)
CALCIUM SERPL-MCNC: 9.3 MG/DL (ref 8.5–10.1)
CHLORIDE SERPL-SCNC: 111 MMOL/L (ref 97–108)
CO2 SERPL-SCNC: 23 MMOL/L (ref 21–32)
CREAT SERPL-MCNC: 0.98 MG/DL (ref 0.55–1.02)
DIFFERENTIAL METHOD BLD: ABNORMAL
EOSINOPHIL # BLD: 0.1 K/UL (ref 0–0.4)
EOSINOPHIL NFR BLD: 1 % (ref 0–7)
ERYTHROCYTE [DISTWIDTH] IN BLOOD BY AUTOMATED COUNT: 13 % (ref 11.5–14.5)
GLUCOSE SERPL-MCNC: 138 MG/DL (ref 65–100)
H PYLORI FROM TISSUE: NEGATIVE
HCT VFR BLD AUTO: 35.3 % (ref 35–47)
HGB BLD-MCNC: 11.1 G/DL (ref 11.5–16)
IMM GRANULOCYTES # BLD: 0 K/UL (ref 0–0.04)
IMM GRANULOCYTES NFR BLD AUTO: 0 % (ref 0–0.5)
KIT LOT NO., HCLOLOT: NORMAL
LYMPHOCYTES # BLD: 1 K/UL (ref 0.8–3.5)
LYMPHOCYTES NFR BLD: 17 % (ref 12–49)
MCH RBC QN AUTO: 30 PG (ref 26–34)
MCHC RBC AUTO-ENTMCNC: 31.4 G/DL (ref 30–36.5)
MCV RBC AUTO: 95.4 FL (ref 80–99)
MONOCYTES # BLD: 0.5 K/UL (ref 0–1)
MONOCYTES NFR BLD: 8 % (ref 5–13)
NEGATIVE CONTROL: NEGATIVE
NEUTS SEG # BLD: 4.2 K/UL (ref 1.8–8)
NEUTS SEG NFR BLD: 73 % (ref 32–75)
NRBC # BLD: 0 K/UL (ref 0–0.01)
NRBC BLD-RTO: 0 PER 100 WBC
PLATELET # BLD AUTO: 251 K/UL (ref 150–400)
PMV BLD AUTO: 9.8 FL (ref 8.9–12.9)
POSITIVE CONTROL: POSITIVE
POTASSIUM SERPL-SCNC: 4.3 MMOL/L (ref 3.5–5.1)
RBC # BLD AUTO: 3.7 M/UL (ref 3.8–5.2)
SODIUM SERPL-SCNC: 139 MMOL/L (ref 136–145)
WBC # BLD AUTO: 5.8 K/UL (ref 3.6–11)

## 2018-08-02 PROCEDURE — 77030008684 HC TU ET CUF COVD -B: Performed by: ANESTHESIOLOGY

## 2018-08-02 PROCEDURE — 76060000032 HC ANESTHESIA 0.5 TO 1 HR: Performed by: SPECIALIST

## 2018-08-02 PROCEDURE — 80048 BASIC METABOLIC PNL TOTAL CA: CPT | Performed by: EMERGENCY MEDICINE

## 2018-08-02 PROCEDURE — 88342 IMHCHEM/IMCYTCHM 1ST ANTB: CPT | Performed by: EMERGENCY MEDICINE

## 2018-08-02 PROCEDURE — 74011250636 HC RX REV CODE- 250/636: Performed by: EMERGENCY MEDICINE

## 2018-08-02 PROCEDURE — 96374 THER/PROPH/DIAG INJ IV PUSH: CPT

## 2018-08-02 PROCEDURE — 74011250636 HC RX REV CODE- 250/636

## 2018-08-02 PROCEDURE — 77030032490 HC SLV COMPR SCD KNE COVD -B: Performed by: SPECIALIST

## 2018-08-02 PROCEDURE — 99285 EMERGENCY DEPT VISIT HI MDM: CPT

## 2018-08-02 PROCEDURE — 71046 X-RAY EXAM CHEST 2 VIEWS: CPT

## 2018-08-02 PROCEDURE — 74011000250 HC RX REV CODE- 250

## 2018-08-02 PROCEDURE — 85025 COMPLETE CBC W/AUTO DIFF WBC: CPT | Performed by: EMERGENCY MEDICINE

## 2018-08-02 PROCEDURE — 77030009426 HC FCPS BIOP ENDOSC BSC -B: Performed by: SPECIALIST

## 2018-08-02 PROCEDURE — 88312 SPECIAL STAINS GROUP 1: CPT | Performed by: EMERGENCY MEDICINE

## 2018-08-02 PROCEDURE — 76210000020 HC REC RM PH II FIRST 0.5 HR: Performed by: SPECIALIST

## 2018-08-02 PROCEDURE — 88305 TISSUE EXAM BY PATHOLOGIST: CPT | Performed by: EMERGENCY MEDICINE

## 2018-08-02 PROCEDURE — 93005 ELECTROCARDIOGRAM TRACING: CPT

## 2018-08-02 PROCEDURE — 77030031656 HC FCPS ENDO GRSP DISP BSC -B: Performed by: SPECIALIST

## 2018-08-02 PROCEDURE — 76010000138 HC OR TIME 0.5 TO 1 HR: Performed by: SPECIALIST

## 2018-08-02 PROCEDURE — 77030026438 HC STYL ET INTUB CARD -A: Performed by: ANESTHESIOLOGY

## 2018-08-02 PROCEDURE — 36415 COLL VENOUS BLD VENIPUNCTURE: CPT | Performed by: EMERGENCY MEDICINE

## 2018-08-02 PROCEDURE — 76210000006 HC OR PH I REC 0.5 TO 1 HR: Performed by: SPECIALIST

## 2018-08-02 PROCEDURE — 77030031603 HC FCPS GRSP ENDOSC OCOA -B: Performed by: SPECIALIST

## 2018-08-02 RX ORDER — SODIUM CHLORIDE 0.9 % (FLUSH) 0.9 %
5-10 SYRINGE (ML) INJECTION EVERY 8 HOURS
Status: DISCONTINUED | OUTPATIENT
Start: 2018-08-02 | End: 2018-08-03 | Stop reason: HOSPADM

## 2018-08-02 RX ORDER — LORAZEPAM 2 MG/ML
2 INJECTION INTRAMUSCULAR AS NEEDED
Status: CANCELLED | OUTPATIENT
Start: 2018-08-02 | End: 2018-08-03

## 2018-08-02 RX ORDER — SODIUM CHLORIDE 9 MG/ML
50 INJECTION, SOLUTION INTRAVENOUS CONTINUOUS
Status: CANCELLED | OUTPATIENT
Start: 2018-08-02 | End: 2018-08-03

## 2018-08-02 RX ORDER — FENTANYL CITRATE 50 UG/ML
25 INJECTION, SOLUTION INTRAMUSCULAR; INTRAVENOUS
Status: DISCONTINUED | OUTPATIENT
Start: 2018-08-02 | End: 2018-08-03 | Stop reason: HOSPADM

## 2018-08-02 RX ORDER — SODIUM CHLORIDE 0.9 % (FLUSH) 0.9 %
5-10 SYRINGE (ML) INJECTION AS NEEDED
Status: DISCONTINUED | OUTPATIENT
Start: 2018-08-02 | End: 2018-08-03 | Stop reason: HOSPADM

## 2018-08-02 RX ORDER — SODIUM CHLORIDE 0.9 % (FLUSH) 0.9 %
5-10 SYRINGE (ML) INJECTION AS NEEDED
Status: CANCELLED | OUTPATIENT
Start: 2018-08-02 | End: 2018-08-03

## 2018-08-02 RX ORDER — FLUMAZENIL 0.1 MG/ML
0.2 INJECTION INTRAVENOUS
Status: CANCELLED | OUTPATIENT
Start: 2018-08-02 | End: 2018-08-03

## 2018-08-02 RX ORDER — DEXTROMETHORPHAN/PSEUDOEPHED 2.5-7.5/.8
1.2 DROPS ORAL
Status: CANCELLED | OUTPATIENT
Start: 2018-08-02

## 2018-08-02 RX ORDER — NALOXONE HYDROCHLORIDE 0.4 MG/ML
0.4 INJECTION, SOLUTION INTRAMUSCULAR; INTRAVENOUS; SUBCUTANEOUS
Status: CANCELLED | OUTPATIENT
Start: 2018-08-02 | End: 2018-08-03

## 2018-08-02 RX ORDER — ESMOLOL HYDROCHLORIDE 10 MG/ML
INJECTION INTRAVENOUS AS NEEDED
Status: DISCONTINUED | OUTPATIENT
Start: 2018-08-02 | End: 2018-08-03 | Stop reason: HOSPADM

## 2018-08-02 RX ORDER — SODIUM CHLORIDE 9 MG/ML
50 INJECTION, SOLUTION INTRAVENOUS CONTINUOUS
Status: DISCONTINUED | OUTPATIENT
Start: 2018-08-02 | End: 2018-08-03 | Stop reason: HOSPADM

## 2018-08-02 RX ORDER — FENTANYL CITRATE 50 UG/ML
200 INJECTION, SOLUTION INTRAMUSCULAR; INTRAVENOUS
Status: CANCELLED | OUTPATIENT
Start: 2018-08-02 | End: 2018-08-03

## 2018-08-02 RX ORDER — PROPOFOL 10 MG/ML
INJECTION, EMULSION INTRAVENOUS AS NEEDED
Status: DISCONTINUED | OUTPATIENT
Start: 2018-08-02 | End: 2018-08-03 | Stop reason: HOSPADM

## 2018-08-02 RX ORDER — SODIUM CHLORIDE 0.9 % (FLUSH) 0.9 %
5-10 SYRINGE (ML) INJECTION EVERY 8 HOURS
Status: CANCELLED | OUTPATIENT
Start: 2018-08-02 | End: 2018-08-03

## 2018-08-02 RX ORDER — ROCURONIUM BROMIDE 10 MG/ML
INJECTION, SOLUTION INTRAVENOUS AS NEEDED
Status: DISCONTINUED | OUTPATIENT
Start: 2018-08-02 | End: 2018-08-03 | Stop reason: HOSPADM

## 2018-08-02 RX ORDER — ONDANSETRON 2 MG/ML
4 INJECTION INTRAMUSCULAR; INTRAVENOUS AS NEEDED
Status: DISCONTINUED | OUTPATIENT
Start: 2018-08-02 | End: 2018-08-03 | Stop reason: HOSPADM

## 2018-08-02 RX ORDER — ATROPINE SULFATE 0.1 MG/ML
0.5 INJECTION INTRAVENOUS
Status: CANCELLED | OUTPATIENT
Start: 2018-08-02 | End: 2018-08-03

## 2018-08-02 RX ORDER — MIDAZOLAM HYDROCHLORIDE 1 MG/ML
1 INJECTION, SOLUTION INTRAMUSCULAR; INTRAVENOUS AS NEEDED
Status: DISCONTINUED | OUTPATIENT
Start: 2018-08-02 | End: 2018-08-03 | Stop reason: HOSPADM

## 2018-08-02 RX ORDER — MORPHINE SULFATE 10 MG/ML
2 INJECTION, SOLUTION INTRAMUSCULAR; INTRAVENOUS
Status: DISCONTINUED | OUTPATIENT
Start: 2018-08-02 | End: 2018-08-03 | Stop reason: HOSPADM

## 2018-08-02 RX ORDER — MIDAZOLAM HYDROCHLORIDE 1 MG/ML
.25-1 INJECTION, SOLUTION INTRAMUSCULAR; INTRAVENOUS
Status: CANCELLED | OUTPATIENT
Start: 2018-08-02 | End: 2018-08-03

## 2018-08-02 RX ORDER — MIDAZOLAM HYDROCHLORIDE 1 MG/ML
0.5 INJECTION, SOLUTION INTRAMUSCULAR; INTRAVENOUS
Status: DISCONTINUED | OUTPATIENT
Start: 2018-08-02 | End: 2018-08-03 | Stop reason: HOSPADM

## 2018-08-02 RX ORDER — LIDOCAINE HYDROCHLORIDE 10 MG/ML
0.1 INJECTION, SOLUTION EPIDURAL; INFILTRATION; INTRACAUDAL; PERINEURAL AS NEEDED
Status: DISCONTINUED | OUTPATIENT
Start: 2018-08-02 | End: 2018-08-03 | Stop reason: HOSPADM

## 2018-08-02 RX ORDER — SODIUM CHLORIDE 9 MG/ML
50 INJECTION, SOLUTION INTRAVENOUS CONTINUOUS
Status: DISCONTINUED | OUTPATIENT
Start: 2018-08-03 | End: 2018-08-03 | Stop reason: HOSPADM

## 2018-08-02 RX ORDER — SODIUM CHLORIDE, SODIUM LACTATE, POTASSIUM CHLORIDE, CALCIUM CHLORIDE 600; 310; 30; 20 MG/100ML; MG/100ML; MG/100ML; MG/100ML
INJECTION, SOLUTION INTRAVENOUS
Status: DISCONTINUED | OUTPATIENT
Start: 2018-08-02 | End: 2018-08-03 | Stop reason: HOSPADM

## 2018-08-02 RX ORDER — EPINEPHRINE 0.1 MG/ML
1 INJECTION INTRACARDIAC; INTRAVENOUS
Status: CANCELLED | OUTPATIENT
Start: 2018-08-02 | End: 2018-08-03

## 2018-08-02 RX ORDER — FENTANYL CITRATE 50 UG/ML
50 INJECTION, SOLUTION INTRAMUSCULAR; INTRAVENOUS AS NEEDED
Status: DISCONTINUED | OUTPATIENT
Start: 2018-08-02 | End: 2018-08-03 | Stop reason: HOSPADM

## 2018-08-02 RX ORDER — DIPHENHYDRAMINE HYDROCHLORIDE 50 MG/ML
12.5 INJECTION, SOLUTION INTRAMUSCULAR; INTRAVENOUS AS NEEDED
Status: ACTIVE | OUTPATIENT
Start: 2018-08-02 | End: 2018-08-02

## 2018-08-02 RX ORDER — SODIUM CHLORIDE, SODIUM LACTATE, POTASSIUM CHLORIDE, CALCIUM CHLORIDE 600; 310; 30; 20 MG/100ML; MG/100ML; MG/100ML; MG/100ML
75 INJECTION, SOLUTION INTRAVENOUS CONTINUOUS
Status: DISCONTINUED | OUTPATIENT
Start: 2018-08-03 | End: 2018-08-03 | Stop reason: HOSPADM

## 2018-08-02 RX ORDER — SUCCINYLCHOLINE CHLORIDE 20 MG/ML
INJECTION INTRAMUSCULAR; INTRAVENOUS AS NEEDED
Status: DISCONTINUED | OUTPATIENT
Start: 2018-08-02 | End: 2018-08-03 | Stop reason: HOSPADM

## 2018-08-02 RX ORDER — OXYCODONE AND ACETAMINOPHEN 5; 325 MG/1; MG/1
1 TABLET ORAL AS NEEDED
Status: DISCONTINUED | OUTPATIENT
Start: 2018-08-02 | End: 2018-08-03 | Stop reason: HOSPADM

## 2018-08-02 RX ORDER — SODIUM CHLORIDE, SODIUM LACTATE, POTASSIUM CHLORIDE, CALCIUM CHLORIDE 600; 310; 30; 20 MG/100ML; MG/100ML; MG/100ML; MG/100ML
75 INJECTION, SOLUTION INTRAVENOUS CONTINUOUS
Status: DISCONTINUED | OUTPATIENT
Start: 2018-08-02 | End: 2018-08-03 | Stop reason: HOSPADM

## 2018-08-02 RX ADMIN — SUCCINYLCHOLINE CHLORIDE 100 MG: 20 INJECTION INTRAMUSCULAR; INTRAVENOUS at 23:32

## 2018-08-02 RX ADMIN — PROPOFOL 100 MG: 10 INJECTION, EMULSION INTRAVENOUS at 23:32

## 2018-08-02 RX ADMIN — GLUCAGON HYDROCHLORIDE 1 MG: KIT at 22:01

## 2018-08-02 RX ADMIN — ROCURONIUM BROMIDE 5 MG: 10 INJECTION, SOLUTION INTRAVENOUS at 23:32

## 2018-08-02 RX ADMIN — ESMOLOL HYDROCHLORIDE 20 MG: 10 INJECTION INTRAVENOUS at 23:39

## 2018-08-02 RX ADMIN — ESMOLOL HYDROCHLORIDE 40 MG: 10 INJECTION INTRAVENOUS at 23:54

## 2018-08-02 RX ADMIN — PROPOFOL 50 MG: 10 INJECTION, EMULSION INTRAVENOUS at 23:44

## 2018-08-02 RX ADMIN — SODIUM CHLORIDE, SODIUM LACTATE, POTASSIUM CHLORIDE, CALCIUM CHLORIDE: 600; 310; 30; 20 INJECTION, SOLUTION INTRAVENOUS at 23:29

## 2018-08-02 RX ADMIN — PROPOFOL 50 MG: 10 INJECTION, EMULSION INTRAVENOUS at 23:35

## 2018-08-02 NOTE — ED PROVIDER NOTES
HPI Comments: 80 y.o. female with past medical history significant for HTN, DM, stroke, anemia, hypercholesterolemia, depression who presents via EMS with chief complaint of choking. Pt reports that she was eating dinner and a piece of chicken got stuck in her throat. Pt is now coughing. Pt denies trouble swallowing, SOB, nausea, vomiting. Pt reports that her speech sounds gargly, which is new for her. Pt denies hx of esophageal dilation. Pt reports hx of similar sx \"a long time ago,\" but reports that the bolus passed on its own. Pt has not had anything to drink since. Pt denies recent illness. There are no other acute medical concerns at this time. Social hx: former tobacco smoker, denies EtOH consumption PCP: Daniel Mendez DO Note written by Sonido Monreal, as dictated by Delfina Ray DO 7:59 PM 
 
 
The history is provided by the patient. No  was used. Past Medical History:  
Diagnosis Date  Anemia  Depression  Diabetes ([facility] [address]  Hypercholesterolemia  Hypertension  Stroke [facility]) 09/2012 CVA; left sided weakness No past surgical history on file. Family History:  
Problem Relation Age of Onset  No Known Problems Mother  No Known Problems Father Social History Social History  Marital status:  Spouse name: N/A  
 Number of children: N/A  
 Years of education: N/A Occupational History  Not on file. Social History Main Topics  Smoking status: Former Smoker Packs/day: 0.25 Quit date: 1/1/2012  Smokeless tobacco: Never Used  Alcohol use No  
 Drug use: No  
 Sexual activity: Not Currently Comment: has one grown child Other Topics Concern  Not on file Social History Narrative ALLERGIES: Review of patient's allergies indicates no known allergies. Review of Systems Constitutional: Negative for activity change, appetite change, chills and fever.  
HENT: Positive for voice change. Negative for congestion, rhinorrhea, sinus pressure, sneezing and sore throat. Eyes: Negative for photophobia and visual disturbance. Respiratory: Positive for cough. Negative for shortness of breath. Cardiovascular: Negative for chest pain. Gastrointestinal: Negative for abdominal pain, blood in stool, constipation, diarrhea, nausea and vomiting. Genitourinary: Negative for difficulty urinating, dysuria, flank pain, frequency, hematuria, menstrual problem, urgency, vaginal bleeding and vaginal discharge. Musculoskeletal: Negative for arthralgias, back pain, myalgias and neck pain. Skin: Negative for rash and wound. Neurological: Negative for syncope, weakness, numbness and headaches. Psychiatric/Behavioral: Negative for self-injury and suicidal ideas. All other systems reviewed and are negative. Vitals:  
 08/02/18 1937 BP: 156/64 Pulse: 80 Resp: 20 Temp: 98.7 °F (37.1 °C) SpO2: 97% Physical Exam  
Constitutional: She is oriented to person, place, and time. She appears well-developed and well-nourished. No distress. Elderly. Speaks in intermittently gargled speech. Intermittent difficulty tolerating secretions. HENT:  
Head: Normocephalic and atraumatic. Mouth/Throat: Uvula is midline and oropharynx is clear and moist. No dental abscesses or uvula swelling. No oropharyngeal exudate, posterior oropharyngeal edema or posterior oropharyngeal erythema. No obvious foreign body. No cervical lymphadenopathy. Eyes: Conjunctivae and EOM are normal. Pupils are equal, round, and reactive to light. No scleral icterus. Neck: Neck supple. No tracheal deviation present. Cardiovascular: Normal rate, regular rhythm, normal heart sounds and intact distal pulses. Exam reveals no gallop and no friction rub. No murmur heard. Pulmonary/Chest: Effort normal. She has no wheezes.  She has rhonchi (likely due to sputum transmitted upper airway soudns). She has no rales. Abdominal: Soft. She exhibits no distension. There is no tenderness. There is no rebound and no guarding. Musculoskeletal: She exhibits no edema. Neurological: She is alert and oriented to person, place, and time. Skin: Skin is warm and dry. No rash noted. She is not diaphoretic. Psychiatric: She has a normal mood and affect. Nursing note and vitals reviewed. Note written by Sonido Cao, as dictated by Navin Enriquez DO 8:09 PM 
 
 
 
MDM 
80 y.o. female with esophageal food bolus impaction from chicken sandwich . Difficulty tolerating her secretions. Failed PO challenge with fluids. Persistent foreign body sensation. No respiratory distress noted. Able to phonate with intermittently gargled voice. Labs were drawn and returned reassuringly with no significant abnormalities. CXR was done and showed no acute abnormality. ED Course Procedures CONSULT NOTE: 
10:28 PM Navin Enriuqez DO spoke with Dr. Balbina Vee, Consult for Gastroenterology. Discussed available diagnostic tests and clinical findings. Dr. Balbina Vee recommends trying glucagon and asssess for response. Dose was given and she noted slightly improvement in her sx, but noted persistent foreign body sensation. Dr. Balbina Vee plans to take pt to the OR for foreign body removal directly from the ED. ED EKG interpretation: 
Rhythm: normal sinus rhythm with occasional PACs. Rate (approx.): 79; Axis: normal; evidence of prior inferior infarct, no acute changes.   
Note written by Sonido Cao, as dictated by Navin Enriquez DO 10:37 PM

## 2018-08-02 NOTE — ED TRIAGE NOTES
Pt was eating chicken for dinner and feels as if it is stuck in the base of her throat. Pt walked to station, reported coughing and unable to talk earlier per the daughter, but when went to the station, she was able to state that she can feel it when she's swallowing. Hx htn. VSS. Per EMS's passive oral exam, patient with no visible obstruction, pt with secretions but has been able to manage them. Patient's vocal quality is gargly, repeatedly clearing her throat.

## 2018-08-02 NOTE — IP AVS SNAPSHOT
[address] 
[phone] Patient: Veronica Lomeli MRN: [id number] CHD:[id number] A check stephanie indicates which time of day the medication should be taken. My Medications START taking these medications Instructions Each Dose to Equal  
 Morning Noon Evening Bedtime  
 omeprazole 20 mg capsule Commonly known as:  PRILOSEC Your last dose was: Your next dose is: Take 1 Cap by mouth daily. Indications: gastroesophageal reflux disease 20 mg CONTINUE taking these medications Instructions Each Dose to Equal  
 Morning Noon Evening Bedtime  
 acetaminophen 650 mg Tab Your last dose was: Your next dose is: Take 650 mg by mouth every six (6) hours as needed. 650 mg  
    
   
   
   
  
 amLODIPine 10 mg tablet Commonly known as:  Alvino Bonner Your last dose was: Your next dose is: TAKE ONE TABLET BY MOUTH EVERY DAY  
     
   
   
   
  
 aspirin delayed-release 81 mg tablet Your last dose was: Your next dose is: Take 1 Tab by mouth daily. 81 mg  
    
   
   
   
  
 atorvastatin 40 mg tablet Commonly known as:  LIPITOR Your last dose was: Your next dose is: TAKE ONE TABLET BY MOUTH EVERY DAY  
     
   
   
   
  
 cloNIDine HCl 0.1 mg tablet Commonly known as:  CATAPRES Your last dose was: Your next dose is: TAKE ONE TABLET BY MOUTH AT BEDTIME  
     
   
   
   
  
 ferrous sulfate 325 mg (65 mg iron) tablet Your last dose was: Your next dose is: TAKE ONE TABLET BY MOUTH EVERY MORNING BEFORE BREAKFAST  
     
   
   
   
  
 geriatric multivitamins & minerals Elix Commonly known as:  ELDERTONIC Your last dose was: Your next dose is: Take 15 mL by mouth Before breakfast, lunch, and dinner. 15 mL  
    
   
   
   
  
 losartan-hydroCHLOROthiazide 100-25 mg per tablet Commonly known as:  HYZAAR Your last dose was: Your next dose is: TAKE ONE TABLET BY MOUTH EVERY DAY  
     
   
   
   
  
 metFORMIN 500 mg tablet Commonly known as:  GLUCOPHAGE Your last dose was: Your next dose is: TAKE ONE TABLET BY MOUTH TWO TIMES A DAY WITH MEALS  
     
   
   
   
  
 mirtazapine 7.5 mg tablet Commonly known as:  [place] Mera Your last dose was: Your next dose is: Take 1 Tab by mouth nightly. 7.5 mg  
    
   
   
   
  
 potassium chloride 20 mEq tablet Commonly known as:  K-DUR, KLOR-CON Your last dose was: Your next dose is: TAKE TWO TABLETS BY MOUTH EVERY DAY Where to Get Your Medications These medications were sent to [address], [address] [facility]  [address] [facility] [facility] [address] Phone:  [phone]  
  omeprazole 20 mg capsule

## 2018-08-02 NOTE — IP AVS SNAPSHOT
[address] [facility] 13 
[phone] Patient: Tiffanie Jacobo MRN: [id number] CUV:14/95/[id number] About your hospitalization You were admitted on:  N/A You last received care in the:  [facility] PACU You were discharged on:  August 3, 2018 Why you were hospitalized Your primary diagnosis was:  Not on File Follow-up Information None Your Scheduled Appointments Monday August 20, 2018 10:15 AM EDT ROUTINE CARE with Dennise Hernandez DO [facility] Internal Medicine ([facility]) [address] [facility] [address] [facility] 13  
[phone] Discharge Orders None A check stephanie indicates which time of day the medication should be taken. My Medications START taking these medications Instructions Each Dose to Equal  
 Morning Noon Evening Bedtime  
 omeprazole 20 mg capsule Commonly known as:  PRILOSEC Your last dose was: Your next dose is: Take 1 Cap by mouth daily. Indications: gastroesophageal reflux disease 20 mg CONTINUE taking these medications Instructions Each Dose to Equal  
 Morning Noon Evening Bedtime  
 acetaminophen 650 mg Tab Your last dose was: Your next dose is: Take 650 mg by mouth every six (6) hours as needed. 650 mg  
    
   
   
   
  
 amLODIPine 10 mg tablet Commonly known as:  Cas Lamb Your last dose was: Your next dose is: TAKE ONE TABLET BY MOUTH EVERY DAY  
     
   
   
   
  
 aspirin delayed-release 81 mg tablet Your last dose was: Your next dose is: Take 1 Tab by mouth daily. 81 mg  
    
   
   
   
  
 atorvastatin 40 mg tablet Commonly known as:  LIPITOR Your last dose was: Your next dose is:  TAKE ONE TABLET BY MOUTH EVERY DAY  
     
   
   
   
  
 cloNIDine HCl 0.1 mg tablet Commonly known as:  CATAPRES Your last dose was: Your next dose is: TAKE ONE TABLET BY MOUTH AT BEDTIME  
     
   
   
   
  
 ferrous sulfate 325 mg (65 mg iron) tablet Your last dose was: Your next dose is: TAKE ONE TABLET BY MOUTH EVERY MORNING BEFORE BREAKFAST  
     
   
   
   
  
 geriatric multivitamins & minerals Elix Commonly known as:  ELDERTONIC Your last dose was: Your next dose is: Take 15 mL by mouth Before breakfast, lunch, and dinner. 15 mL  
    
   
   
   
  
 losartan-hydroCHLOROthiazide 100-25 mg per tablet Commonly known as:  HYZAAR Your last dose was: Your next dose is: TAKE ONE TABLET BY MOUTH EVERY DAY  
     
   
   
   
  
 metFORMIN 500 mg tablet Commonly known as:  GLUCOPHAGE Your last dose was: Your next dose is: TAKE ONE TABLET BY MOUTH TWO TIMES A DAY WITH MEALS  
     
   
   
   
  
 mirtazapine 7.5 mg tablet Commonly known as:  Ivette Montana Your last dose was: Your next dose is: Take 1 Tab by mouth nightly. 7.5 mg  
    
   
   
   
  
 potassium chloride 20 mEq tablet Commonly known as:  K-DUR, KLOR-CON Your last dose was: Your next dose is: TAKE TWO TABLETS BY MOUTH EVERY DAY Where to Get Your Medications These medications were sent to [address] [facility], [address] [place] [address] [facility]  [address] [address] Phone:  [phone]  
  omeprazole 20 mg capsule Discharge Instructions [address] [facility] [id number] 
11/14/1931 EGD DISCHARGE INSTRUCTIONS Discomfort: 
Sore throat- throat lozenges or warm salt water gargle 
redness at IV site- apply warm compress to area; if redness or soreness persist- contact your physician Gaseous discomfort- walking, belching will help relieve any discomfort You may not operate a vehicle for 12 hours You may not engage in an occupation involving machinery or appliances for rest of today. You may not drink alcoholic beverages for at least 12 hours Avoid making any critical decisions for at least 24 hour DIET You may resume your regular diet  however -  remember your colon is empty and a heavy meal will produce gas. Avoid these foods:  vegetables, fried / greasy foods, carbonated drinks ACTIVITY You may resume your normal daily activities. Spend the remainder of the day resting -  avoid any strenuous activity. CALL M.D. ANY SIGN OF Increasing pain, nausea, vomiting Abdominal distension (swelling) New increased bleeding (oral or rectal) Fever (chills) Pain in chest area Bloody discharge from nose or mouth Shortness of breath You may not take any Advil, Aspirin, Ibuprofen, Motrin, Aleve, or Goodys unless MD recommended, ONLY  Tylenol as needed for pain. Follow-up Instructions: 
 Call Dr. Jana Waddell office to make appointment for UGI and esophagogram 
Office telephone for problems or questions [phone] Results of procedure / biopsy in 10-14 days Start omeprazole 20 mg daily same strength as over the counter Prilosec but script might be less expensive Introducing Yovanny Peña As a [employer] patient, I wanted to make you aware of our electronic visit tool called Yovanny Peña. [employer] 24/7 allows you to connect within minutes with a medical provider 24 hours a day, seven days a week via a mobile device or tablet or logging into a secure website from your computer. You can access Yovanny Peña from anywhere in the [place].  
 
A virtual visit might be right for you when you have a simple condition and feel like you just dont want to get out of bed, or cant get away from work for an appointment, when your regular [employer] provider is not available (evenings, weekends or holidays), or when youre out of town and need minor care. Electronic visits cost only $49 and if the [employer] 24/7 provider determines a prescription is needed to treat your condition, one can be electronically transmitted to a nearby pharmacy*. Please take a moment to enroll today if you have not already done so. The enrollment process is free and takes just a few minutes. To enroll, please download the [employer] 24/7 roque to your tablet or phone, or visit [email or web address] to enroll on your computer. And, as an [address] [place] [address] patient with a [employer] account, the results of your visits will be scanned into your electronic medical record and your primary care provider will be able to view the scanned results. We urge you to continue to see your regular [employer] provider for your ongoing medical care. And while your primary care provider may not be the one available when you seek a [employer]Exactly[employer] virtual visit, the peace of mind you get from getting a real diagnosis real time can be priceless. For more information on [employer], view our Frequently Asked Questions (FAQs) at [email or web address]. Sincerely, 
 
Valentino Milks, MD 
Chief Medical Officer [address] *:  certain medications cannot be prescribed via [facility] Unresulted Labs-Please follow up with your PCP about these lab tests Order Current Status EKG, 12 LEAD, INITIAL Preliminary result Providers Seen During Your Hospitalization Provider Specialty Primary office phone Maricruz Adams DO Emergency Medicine [phone] Your Primary Care Physician (PCP) Primary Care Physician Office Phone Office Fax Leonard Junbrielle [phone] You are allergic to the following No active allergies Recent Documentation Height Weight BMI OB Status Smoking Status 1.575 m 45.4 kg 18.29 kg/m2 Hysterectomy Former Smoker Emergency Contacts Name Discharge Info Relation Home Work Mobile Sherri Ruiz DISCHARGE CAREGIVER [3] Daughter [21] [phone] Patient Belongings The following personal items are in your possession at time of discharge: 
     Visual Aid: None Please provide this summary of care documentation to your next provider. Signatures-by signing, you are acknowledging that this After Visit Summary has been reviewed with you and you have received a copy. Patient Signature:  ____________________________________________________________ Date:  ____________________________________________________________  
  
[facility] Provider Signature:  ____________________________________________________________ Date:  ____________________________________________________________

## 2018-08-03 VITALS
RESPIRATION RATE: 20 BRPM | WEIGHT: 100 LBS | HEART RATE: 81 BPM | SYSTOLIC BLOOD PRESSURE: 155 MMHG | TEMPERATURE: 97.7 F | DIASTOLIC BLOOD PRESSURE: 70 MMHG | HEIGHT: 62 IN | OXYGEN SATURATION: 98 % | BODY MASS INDEX: 18.4 KG/M2

## 2018-08-03 LAB
ATRIAL RATE: 79 BPM
CALCULATED P AXIS, ECG09: 41 DEGREES
CALCULATED R AXIS, ECG10: -23 DEGREES
CALCULATED T AXIS, ECG11: 39 DEGREES
DIAGNOSIS, 93000: NORMAL
P-R INTERVAL, ECG05: 136 MS
Q-T INTERVAL, ECG07: 378 MS
QRS DURATION, ECG06: 64 MS
QTC CALCULATION (BEZET), ECG08: 433 MS
VENTRICULAR RATE, ECG03: 79 BPM

## 2018-08-03 PROCEDURE — 74011000250 HC RX REV CODE- 250

## 2018-08-03 PROCEDURE — 87077 CULTURE AEROBIC IDENTIFY: CPT | Performed by: SPECIALIST

## 2018-08-03 RX ORDER — LABETALOL HYDROCHLORIDE 5 MG/ML
5 INJECTION, SOLUTION INTRAVENOUS ONCE
Status: COMPLETED | OUTPATIENT
Start: 2018-08-03 | End: 2018-08-03

## 2018-08-03 RX ORDER — LABETALOL HYDROCHLORIDE 5 MG/ML
INJECTION, SOLUTION INTRAVENOUS
Status: COMPLETED
Start: 2018-08-03 | End: 2018-08-03

## 2018-08-03 RX ORDER — OMEPRAZOLE 20 MG/1
20 CAPSULE, DELAYED RELEASE ORAL DAILY
Qty: 30 CAP | Refills: 3 | Status: SHIPPED | OUTPATIENT
Start: 2018-08-03 | End: 2019-07-29 | Stop reason: SDUPTHER

## 2018-08-03 RX ADMIN — LABETALOL HYDROCHLORIDE 5 MG: 5 INJECTION INTRAVENOUS at 00:37

## 2018-08-03 RX ADMIN — LABETALOL HYDROCHLORIDE 5 MG: 5 INJECTION, SOLUTION INTRAVENOUS at 00:37

## 2018-08-03 NOTE — ANESTHESIA POSTPROCEDURE EVALUATION
Post-Anesthesia Evaluation and Assessment Patient: Mikaela Salgado MRN: [id number]  SSN: [id number] YOB: 1931  Age: 80 y.o. Sex: female Cardiovascular Function/Vital Signs Visit Vitals  /70  Pulse 81  Temp 36.5 °C (97.7 °F)  Resp 20  
 Ht 5' 2\" (1.575 m)  Wt 45.4 kg (100 lb)  SpO2 98%  BMI 18.29 kg/m2 Patient is status post general anesthesia for Procedure(s): ESOPHAGOGASTRODUODENOSCOPY (EGD) for foreign body rmoval/esophagial dilation. Nausea/Vomiting: None Postoperative hydration reviewed and adequate. Pain: 
Pain Scale 1: Numeric (0 - 10) (08/03/18 0109) Pain Intensity 1: 0 (08/03/18 0109) Managed Neurological Status:  
Neuro (WDL): Within Defined Limits (08/03/18 0020) Neuro LUE Motor Response: Purposeful (08/03/18 0020) LLE Motor Response: Purposeful (08/03/18 0020) RUE Motor Response: Purposeful (08/03/18 0020) RLE Motor Response: Purposeful (08/03/18 0020) At baseline Mental Status and Level of Consciousness: Alert at baseline Pulmonary Status:  
O2 Device: Room air (08/03/18 0053) Adequate oxygenation and airway patent Complications related to anesthesia: None Post-anesthesia assessment completed. No concerns Signed By: Dante Montano DO August 3, 2018

## 2018-08-03 NOTE — PROCEDURES
EGD Procedure Note    Indications:  Dysphagia/odynophagia, meat impaction   Referring Physician: Edelmira Boucher DO   Anesthesia/Sedation:General  Endoscopist:  Dr. Margarito Bowen  Assistant:  Circ-1: Raqule Diamond RN  Endoscopy Technician-1: Wagner Zavala  Endoscopy RN-2: Rommel Flores RN    Preoperative diagnosis: Foreign body esophagus    Postoperative diagnosis: Foreign body esophagus (food bolus)      Procedure in Detail:  Informed consent was obtained for the procedure, including sedation. Risks of perforation, hemorrhage, adverse drug reaction, and aspiration were discussed. The patient was placed in the left lateral decubitus position. Based on the pre-procedure assessment, including review of the patient's medical history, medications, allergies, and review of systems, she had been deemed to be an appropriate candidate for moderate sedation; she was therefore sedated with the medications listed above. The patient was monitored continuously with ECG tracing, pulse oximetry, blood pressure monitoring, and direct observations. An Olympus video endoscope was inserted into the mouth and advanced under direct vision to into the esophagus, then stomach and duodenum. A careful inspection was made as the gastroscope was withdrawn, including a retroflexed view of the proximal stomach; findings and interventions are described below. Findings:   Esophagus:large chicken bolus at the UES and above it, broken up with rat tooth forceps then pushed into stomach; chronic diffuse esophagitis with granular appearance and multiple wide caliber rings, mildly distended esophagus.  Dilated with OTG Savory 42-45 [place] Stefanie with mucosa tear, Bx from lower, mid and upper esophagus  Stomach: severe atrophic gastritis with patch of erythema and small erosions of the antrum lesser curvature- Bx for path and staining from antrum and body and Pyloritek from antrum and fundus  Duodenum/jejunum: normal    Therapies:  See above    Specimens: see above           EBL: None    Complications:   None; patient tolerated the procedure well. Recommendations:  -Acid suppression with a proton pump inhibitor. , -Await pathology. , -Await CHANTELLE test result and treat for Helicobacter pylori if positive. , -No NSAIDS, -call office - [phone] to schedule esophagogram and UGI.  Start omeprazole    Anthony Fuentes MD

## 2018-08-03 NOTE — ED NOTES
PO challenge per Dread Arzate completed. Pt unable to swallow, spitting up water and phlegm. Pt appears to be spitting up more than what she drank. Pt reports she feels better after coughing up water and phlegm.

## 2018-08-03 NOTE — ANESTHESIA PREPROCEDURE EVALUATION
Anesthetic History No history of anesthetic complications Review of Systems / Medical History Patient summary reviewed, nursing notes reviewed and pertinent labs reviewed Pulmonary Within defined limits Neuro/Psych CVA Psychiatric history and dementia Cardiovascular Hypertension Hyperlipidemia Exercise tolerance: <4 METS 
  
GI/Hepatic/Renal 
  
 
 
 
 
 
Comments: Food impaction Endo/Other Diabetes Anemia Other Findings Physical Exam 
 
Airway Mallampati: II 
TM Distance: 4 - 6 cm Neck ROM: decreased range of motion Mouth opening: Normal 
 
 Cardiovascular Regular rate and rhythm,  S1 and S2 normal,  no murmur, click, rub, or gallop Rhythm: regular Rate: normal 
 
 
 
 Dental 
 
Dentition: Edentulous Pulmonary Breath sounds clear to auscultation Abdominal 
GI exam deferred Other Findings Anesthetic Plan ASA: 3 Anesthesia type: general 
 
 
 
 
Induction: RSI Anesthetic plan and risks discussed with: Patient

## 2018-08-03 NOTE — H&P
Pre-endoscopy H and P The patient was seen and examined. Suspected upper esophageal food impaction from 6 PM tonight. The airway was assessed and documented. The problem list, past medical history, and medications were reviewed. Patient Active Problem List  
Diagnosis Code  Type 2 diabetes mellitus without complication (HCC) F23.0  
 Essential hypertension I10  
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  
 Insomnia G47.00  Cataracts, bilateral H26.9  Chronic diarrhea K52.9  ACP (advance care planning) Z71.89  
 Memory impairment R41.3  Chronic anemia D64.9  Depression F32.9 Social History Social History  Marital status:  Spouse name: N/A  
 Number of children: N/A  
 Years of education: N/A Occupational History  Not on file. Social History Main Topics  Smoking status: Former Smoker Packs/day: 0.25 Quit date: 1/1/2012  Smokeless tobacco: Never Used  Alcohol use No  
 Drug use: No  
 Sexual activity: Not Currently Comment: has one grown child Other Topics Concern  Not on file Social History Narrative Past Medical History:  
Diagnosis Date  Anemia  Depression  Diabetes ([facility] [address]  Hypercholesterolemia  Hypertension  Stroke [facility]) 09/2012 CVA; left sided weakness The patient has a family history of na Prior to Admission Medications Prescriptions Last Dose Informant Patient Reported? Taking?  
acetaminophen 650 mg Tab   No No  
Sig: Take 650 mg by mouth every six (6) hours as needed. amLODIPine (NORVASC) 10 mg tablet   No No  
Sig: TAKE ONE TABLET BY MOUTH EVERY DAY  
aspirin delayed-release 81 mg tablet   No No  
Sig: Take 1 Tab by mouth daily.   
atorvastatin (LIPITOR) 40 mg tablet   No No  
Sig: TAKE ONE TABLET BY MOUTH EVERY DAY  
cloNIDine HCl (CATAPRES) 0.1 mg tablet   No No  
Sig: TAKE ONE TABLET BY MOUTH AT BEDTIME  
ferrous sulfate 325 mg (65 mg iron) tablet   No No  
Sig: TAKE ONE TABLET BY MOUTH EVERY MORNING BEFORE BREAKFAST  
geriatric multivitamins & minerals (ELDERTONIC) elix   No No  
Sig: Take 15 mL by mouth Before breakfast, lunch, and dinner. losartan-hydroCHLOROthiazide (HYZAAR) 100-25 mg per tablet   No No  
Sig: TAKE ONE TABLET BY MOUTH EVERY DAY  
metFORMIN (GLUCOPHAGE) 500 mg tablet   No No  
Sig: TAKE ONE TABLET BY MOUTH TWO TIMES A DAY WITH MEALS  
mirtazapine (REMERON) 7.5 mg tablet   No No  
Sig: Take 1 Tab by mouth nightly. potassium chloride (K-DUR, KLOR-CON) 20 mEq tablet   No No  
Sig: TAKE TWO TABLETS BY MOUTH EVERY DAY Facility-Administered Medications: None The review of systems is:  negative for shortness of breath or chest pain The heart, lungs and mental status were satisfactory for the administration of MAC sedation and for the procedure. Mallampati score: See Anesthesia. I discussed with the patient the objectives, risks, consequences and alternatives to the procedure. Plan: Endoscopic procedure with MAC sedation. Larose Curling, MD 
8/2/2018 10:45 PM

## 2018-08-03 NOTE — DISCHARGE INSTRUCTIONS
Lars Schlatter  [id number]  11/14/1931    EGD DISCHARGE INSTRUCTIONS  Discomfort:  Sore throat- throat lozenges or warm salt water gargle  redness at IV site- apply warm compress to area; if redness or soreness persist- contact your physician  Gaseous discomfort- walking, belching will help relieve any discomfort  You may not operate a vehicle for 12 hours  You may not engage in an occupation involving machinery or appliances for rest of today. You may not drink alcoholic beverages for at least 12 hours  Avoid making any critical decisions for at least 24 hour  DIET  You may resume your regular diet - however -  remember your colon is empty and a heavy meal will produce gas. Avoid these foods:  vegetables, fried / greasy foods, carbonated drinks  ACTIVITY  You may resume your normal daily activities. Spend the remainder of the day resting -  avoid any strenuous activity. CALL M.D. ANY SIGN OF   Increasing pain, nausea, vomiting  Abdominal distension (swelling)  New increased bleeding (oral or rectal)  Fever (chills)  Pain in chest area  Bloody discharge from nose or mouth  Shortness of breath    You may not take any Advil, Aspirin, Ibuprofen, Motrin, Aleve, or Goodys unless MD recommended, ONLY  Tylenol as needed for pain.     Follow-up Instructions:   Call Dr. Gamez Mo office to make appointment for UGI and esophagogram  Office telephone for problems or questions [phone]  Results of procedure / biopsy in 10-14 days   Start omeprazole 20 mg daily same strength as over the counter Prilosec but script might be less expensive

## 2018-08-03 NOTE — ROUTINE PROCESS
TRANSFER - OUT REPORT: 
 
Verbal report given to [address] Jarek Basurto, YAMILEX(name) on [facility]  being transferred to PACU (unit) for routine progression of care Report consisted of patients Situation, Background, Assessment and  
Recommendations(SBAR). Information from the following report(s) SBAR, ED Summary, Intake/Output, MAR and Recent Results was reviewed with the receiving nurse. Lines:  
Peripheral IV 08/02/18 Left; Lower; Outer Forearm (Active) Site Assessment Clean, dry, & intact 8/2/2018  9:57 PM  
Phlebitis Assessment 0 8/2/2018  9:57 PM  
Infiltration Assessment 0 8/2/2018  9:57 PM  
Dressing Status Clean, dry, & intact 8/2/2018  9:57 PM  
Dressing Type Transparent 8/2/2018  9:57 PM  
Hub Color/Line Status Pink;Patent; Flushed 8/2/2018  9:57 PM  
Action Taken Blood drawn 8/2/2018  9:57 PM  
  
 
Opportunity for questions and clarification was provided. Patient transported with: 
 PACU staff

## 2018-08-03 NOTE — ROUTINE PROCESS
TRANSFER - IN REPORT: 
 
Verbal report received from [address] [place] [address] RN(name) on 116 [facility]  being received from ED(unit) for ordered procedure Report consisted of patients Situation, Background, Assessment and  
Recommendations(SBAR). Information from the following report(s) SBAR, ED Summary, Intake/Output, MAR, Recent Results and Cardiac Rhythm NSR. was reviewed with the receiving nurse. Opportunity for questions and clarification was provided. Assessment completed upon patients arrival to unit and care assumed.

## 2018-08-20 ENCOUNTER — OFFICE VISIT (OUTPATIENT)
Dept: INTERNAL MEDICINE CLINIC | Age: 83
End: 2018-08-20

## 2018-08-20 VITALS
RESPIRATION RATE: 18 BRPM | OXYGEN SATURATION: 98 % | WEIGHT: 101 LBS | TEMPERATURE: 98.8 F | HEART RATE: 77 BPM | DIASTOLIC BLOOD PRESSURE: 64 MMHG | HEIGHT: 62 IN | SYSTOLIC BLOOD PRESSURE: 118 MMHG | BODY MASS INDEX: 18.58 KG/M2

## 2018-08-20 DIAGNOSIS — E11.9 TYPE 2 DIABETES MELLITUS WITHOUT COMPLICATION, WITHOUT LONG-TERM CURRENT USE OF INSULIN (HCC): Primary | ICD-10-CM

## 2018-08-20 DIAGNOSIS — F33.41 RECURRENT MAJOR DEPRESSIVE DISORDER, IN PARTIAL REMISSION (HCC): ICD-10-CM

## 2018-08-20 DIAGNOSIS — I10 ESSENTIAL HYPERTENSION: ICD-10-CM

## 2018-08-20 DIAGNOSIS — R41.3 MEMORY IMPAIRMENT: ICD-10-CM

## 2018-08-20 DIAGNOSIS — I69.319 CVA, OLD, COGNITIVE DEFICITS: ICD-10-CM

## 2018-08-20 DIAGNOSIS — Z00.00 MEDICARE ANNUAL WELLNESS VISIT, SUBSEQUENT: ICD-10-CM

## 2018-08-20 DIAGNOSIS — Z71.89 ACP (ADVANCE CARE PLANNING): ICD-10-CM

## 2018-08-20 PROBLEM — F32.A MILD DEPRESSION: Status: ACTIVE | Noted: 2018-08-20

## 2018-08-20 LAB
MICROALBUMIN UR TEST STR-MCNC: 30 MG/L
MICROALBUMIN/CREAT RATIO POC: <30 MG/G

## 2018-08-20 NOTE — MR AVS SNAPSHOT
[address] [facility] [address] [place] [address] 
[phone] Patient: Jesus Epps MRN: [id number]:[id number] Visit Information Date & Time Provider Department Dept. Phone Encounter #  
 8/20/2018 10:15 AM Kennedi Weber [facility] [phone] [id number] Follow-up Instructions Return if symptoms worsen or fail to improve. Upcoming Health Maintenance Date Due Bone Densitometry (Dexa) Screening 11/14/1996 EYE EXAM RETINAL OR DILATED Q1 8/11/2017 MICROALBUMIN Q1 6/9/2018 MEDICARE YEARLY EXAM 6/17/2018 Influenza Age 5 to Adult 8/1/2018 GLAUCOMA SCREENING Q2Y 8/11/2018 HEMOGLOBIN A1C Q6M 10/27/2018 FOOT EXAM Q1 12/1/2018 LIPID PANEL Q1 4/27/2019 DTaP/Tdap/Td series (2 - Td) 6/16/2027 Allergies as of 8/20/2018  Review Complete On: 8/20/2018 By: Lionel Plunkett, DO No Known Allergies Current Immunizations  Reviewed on 6/16/2017 Name Date Influenza High Dose Vaccine PF 12/1/2017, 12/16/2016, 11/27/2015 12:09 PM  
 Influenza Vaccine 11/14/2014 Pneumococcal Conjugate (PCV-13) 11/27/2015 12:10 PM  
 Pneumococcal Polysaccharide (PPSV-23) 6/16/2017 Not reviewed this visit You Were Diagnosed With   
  
 Codes Comments Type 2 diabetes mellitus without complication, without long-term current use of insulin (HCC)    -  Primary ICD-10-CM: E11.9 ICD-9-CM: 250.00 Essential hypertension     ICD-10-CM: I10 
ICD-9-CM: 401.9   
 CVA, old, cognitive deficits     ICD-10-CM: I69.319 ICD-9-CM: 438.0 Memory impairment     ICD-10-CM: R41.3 ICD-9-CM: 780.93 Recurrent major depressive disorder, in partial remission (HCC)     ICD-10-CM: F33.41 
ICD-9-CM: 296.35   
 ACP (advance care planning)     ICD-10-CM: Z71.89 ICD-9-CM: V65.49 Medicare annual wellness visit, subsequent     ICD-10-CM: Z00.00 ICD-9-CM: V70.0 Vitals BP Pulse Temp Resp Height(growth percentile) Weight(growth percentile)  
 118/64 (BP 1 Location: Left arm, BP Patient Position: Sitting) 77 98.8 °F (37.1 °C) (Oral) 18 5' 2\" (1.575 m) 101 lb (45.8 kg) SpO2 BMI OB Status Smoking Status 98% 18.47 kg/m2 Hysterectomy Former Smoker Vitals History BMI and BSA Data Body Mass Index Body Surface Area  
 18.47 kg/m 2 1.42 m 2 Preferred Pharmacy Pharmacy Name Phone [facility], [address] [facility] [place] [address] [phone] Your Updated Medication List  
  
   
This list is accurate as of 8/20/18 11:05 AM.  Always use your most recent med list.  
  
  
  
  
 acetaminophen 650 mg Tab Take 650 mg by mouth every six (6) hours as needed. amLODIPine 10 mg tablet Commonly known as:  [place] TAKE ONE TABLET BY MOUTH EVERY DAY  
  
 aspirin delayed-release 81 mg tablet Take 1 Tab by mouth daily. atorvastatin 40 mg tablet Commonly known as:  LIPITOR  
TAKE ONE TABLET BY MOUTH EVERY DAY  
  
 cloNIDine HCl 0.1 mg tablet Commonly known as:  CATAPRES  
TAKE ONE TABLET BY MOUTH AT BEDTIME  
  
 ferrous sulfate 325 mg (65 mg iron) tablet TAKE ONE TABLET BY MOUTH EVERY MORNING BEFORE BREAKFAST  
  
 geriatric multivitamins & minerals Elix Commonly known as:  ELDERTONIC Take 15 mL by mouth Before breakfast, lunch, and dinner. losartan-hydroCHLOROthiazide 100-25 mg per tablet Commonly known as:  HYZAAR  
TAKE ONE TABLET BY MOUTH EVERY DAY  
  
 metFORMIN 500 mg tablet Commonly known as:  GLUCOPHAGE  
TAKE ONE TABLET BY MOUTH TWO TIMES A DAY WITH MEALS  
  
 mirtazapine 7.5 mg tablet Commonly known as:  Ivette [place] Take 1 Tab by mouth nightly. omeprazole 20 mg capsule Commonly known as:  PRILOSEC Take 1 Cap by mouth daily. Indications: gastroesophageal reflux disease  
  
 potassium chloride 20 mEq tablet Commonly known as:  K-DEMI, REID-CON  
 TAKE TWO TABLETS BY MOUTH EVERY DAY We Performed the Following AMB POC URINE, MICROALBUMIN, SEMIQUANTITATIVE [98621 CPT(R)] Follow-up Instructions Return if symptoms worsen or fail to improve. Please provide this summary of care documentation to your next provider. Your primary care clinician is listed as Vel Austin. If you have any questions after today's visit, please call [phone].

## 2018-08-20 NOTE — PROGRESS NOTES
HISTORY OF PRESENT ILLNESS  Lars Schlatter is a 80 y.o. female. Pt. comes in with her daughter for f/u. Has multiple medical problems. Reports compliance with medications and diet. Med list and most recent labs/studies reviewed with pt. Trying to be active physically as tolerated. ACP discussed. Reports no other new c/o. HPI    Review of Systems   Constitutional: Negative. HENT: Negative. Eyes: Positive for blurred vision. Respiratory: Negative for shortness of breath. Cardiovascular: Negative for chest pain and leg swelling. Gastrointestinal: Negative for abdominal pain. Genitourinary: Negative for dysuria and frequency. Musculoskeletal: Positive for joint pain. Negative for falls. Skin: Negative. Neurological: Negative for dizziness, sensory change, focal weakness and headaches. Psychiatric/Behavioral: Positive for depression and memory loss. Negative for suicidal ideas. The patient is nervous/anxious and has insomnia. All other systems reviewed and are negative. Physical Exam   Constitutional: She is oriented to person, place, and time. She appears well-developed and well-nourished. No distress. HENT:   Head: Normocephalic and atraumatic. Mouth/Throat: Oropharynx is clear and moist.   Eyes: Conjunctivae are normal.   Neck: Normal range of motion. Neck supple. No thyromegaly present. Cardiovascular: Normal rate, regular rhythm, normal heart sounds and intact distal pulses. No murmur heard. Pulmonary/Chest: Effort normal and breath sounds normal. No respiratory distress. She has no wheezes. She has no rales. Abdominal: Soft. Bowel sounds are normal. She exhibits no distension. Musculoskeletal: She exhibits no edema or tenderness. djd   Neurological: She is alert and oriented to person, place, and time. Coordination normal.   A+O to name and [place]   Skin: Skin is warm and dry. No rash noted. Psychiatric: She has a normal mood and affect.  Her behavior is normal.   Nursing note and vitals reviewed. ASSESSMENT and PLAN  Diagnoses and all orders for this visit:    1. Type 2 diabetes mellitus without complication, without long-term current use of insulin (HCC)  -     AMB POC URINE, MICROALBUMIN, SEMIQUANTITATIVE    2. Essential hypertension    3. CVA, old, cognitive deficits    4. Memory impairment    5. Recurrent major depressive disorder, in partial remission ([facility]    6. ACP (advance care planning)    7.  Medicare annual wellness visit, subsequent      Follow-up Disposition:  Return if symptoms worsen or fail to improve.   lab results and schedule of future lab studies reviewed with patient  reviewed diet, exercise and weight control  reviewed medications and side effects in detail  low cholesterol diet, weight control and daily exercise discussed, home glucose monitoring emphasized, all medications, side effects and compliance discussed carefully, foot care discussed and Podiatry visits discussed, annual eye examinations at Ophthalmology discussed, glycohemoglobin and other lab monitoring discussed and long term diabetic complications discussed

## 2018-08-20 NOTE — PROGRESS NOTES
Alvaro Maradiaga is a 80 y.o. female and presents for annual Medicare Wellness Visit. Problem List: Reviewed with patient and discussed risk factors. Patient Active Problem List   Diagnosis Code    Type 2 diabetes mellitus without complication ([facility] [address] M20.2    Essential hypertension I10    Hypercholesterolemia E78.00    CVA, old, cognitive deficits I69.319    Insomnia G47.00    Cataracts, bilateral H26.9    Chronic diarrhea K52.9    ACP (advance care planning) Z71.89    Memory impairment R41.3    Chronic anemia D64.9    Depression F32.9    Mild depression ([facility] [address] F32.0    Medicare annual wellness visit, subsequent Z00.00       Current medical providers:  Patient Care Team:  Yael Yates DO as PCP - General (Internal Medicine)    PSH: Reviewed with patient  History reviewed. No pertinent surgical history. SH: Reviewed with patient  Social History   Substance Use Topics    Smoking status: Former Smoker     Packs/day: 0.25     Quit date: 1/1/2012    Smokeless tobacco: Never Used    Alcohol use No       FH: Reviewed with patient  Family History   Problem Relation Age of Onset    No Known Problems Mother     No Known Problems Father        Medications/Allergies: Reviewed with patient  Current Outpatient Prescriptions on File Prior to Visit   Medication Sig Dispense Refill    omeprazole (PRILOSEC) 20 mg capsule Take 1 Cap by mouth daily. Indications: gastroesophageal reflux disease 30 Cap 3    amLODIPine (NORVASC) 10 mg tablet TAKE ONE TABLET BY MOUTH EVERY DAY 90 Tab 2    atorvastatin (LIPITOR) 40 mg tablet TAKE ONE TABLET BY MOUTH EVERY DAY 90 Tab 2    cloNIDine HCl (CATAPRES) 0.1 mg tablet TAKE ONE TABLET BY MOUTH AT BEDTIME 90 Tab 2    ferrous sulfate 325 mg (65 mg iron) tablet TAKE ONE TABLET BY MOUTH EVERY MORNING BEFORE BREAKFAST 90 Tab 2    geriatric multivitamins & minerals (ELDERTONIC) elix Take 15 mL by mouth Before breakfast, lunch, and dinner.  473 mL 5    losartan-hydroCHLOROthiazide (HYZAAR) 100-25 mg per tablet TAKE ONE TABLET BY MOUTH EVERY DAY 90 Tab 2    metFORMIN (GLUCOPHAGE) 500 mg tablet TAKE ONE TABLET BY MOUTH TWO TIMES A DAY WITH MEALS 180 Tab 2    mirtazapine (REMERON) 7.5 mg tablet Take 1 Tab by mouth nightly. 90 Tab 2    potassium chloride (K-DUR, KLOR-CON) 20 mEq tablet TAKE TWO TABLETS BY MOUTH EVERY DAY 90 Tab 2    aspirin delayed-release 81 mg tablet Take 1 Tab by mouth daily. 90 Tab 2    acetaminophen 650 mg Tab Take 650 mg by mouth every six (6) hours as needed. 100 Tab 0     No current facility-administered medications on file prior to visit. No Known Allergies    Objective:  Visit Vitals    /64 (BP 1 Location: Left arm, BP Patient Position: Sitting)    Pulse 77    Temp 98.8 °F (37.1 °C) (Oral)    Resp 18    Ht 5' 2\" (1.575 m)    Wt 101 lb (45.8 kg)    SpO2 98%    BMI 18.47 kg/m2    Body mass index is 18.47 kg/(m^2). Assessment of cognitive impairment: Alert and oriented x 3    Depression Screen:   PHQ over the last two weeks 8/20/2018   Little interest or pleasure in doing things Not at all   Feeling down, depressed, irritable, or hopeless Not at all   Total Score PHQ 2 0   Trouble falling or staying asleep, or sleeping too much Several days   Feeling tired or having little energy Several days   Poor appetite, weight loss, or overeating Several days   Feeling bad about yourself - or that you are a failure or have let yourself or your family down Not at all   Trouble concentrating on things such as school, work, reading, or watching TV More than half the days   Moving or speaking so slowly that other people could have noticed; or the opposite being so fidgety that others notice Not at all   Thoughts of being better off dead, or hurting yourself in some way Not at all   PHQ 9 Score 5       Fall Risk Assessment:    Fall Risk Assessment, last 12 mths 8/20/2018   Able to walk? Yes   Fall in past 12 months?  No   Fall with injury? -   Number of falls in past 12 months -   Fall Risk Score -       Functional Ability:   Does the patient exhibit a steady gait? yes   How long did it take the patient to get up and walk from a sitting position? 10 sec   Is the patient self reliant?  (ie can do own laundry, meals, household chores)  yes     Does the patient handle his/her own medications? yes     Does the patient handle his/her own money? yes     Is the patients home safe (ie good lighting, handrails on stairs and bath, etc.)? yes     Did you notice or did patient express any hearing difficulties? no     Did you notice or did patient express any vision difficulties? yes     Were distance and reading eye charts used? no       Advance Care Planning:   Patient was offered the opportunity to discuss advance care planning:  yes     Does patient have an Advance Directive:  no   If no, did you provide information on Caring Connections? yes       Plan:      Orders Placed This Encounter    AMB POC URINE, MICROALBUMIN, SEMIQUANTITATIVE       Health Maintenance   Topic Date Due    Bone Densitometry (Dexa) Screening  11/14/1996    EYE EXAM RETINAL OR DILATED Q1  08/11/2017    MICROALBUMIN Q1  06/09/2018    Influenza Age 5 to Adult  08/01/2018    GLAUCOMA SCREENING Q2Y  08/11/2018    HEMOGLOBIN A1C Q6M  10/27/2018    FOOT EXAM Q1  12/01/2018    LIPID PANEL Q1  04/27/2019    MEDICARE YEARLY EXAM  08/21/2019    DTaP/Tdap/Td series (2 - Td) 06/16/2027    ZOSTER VACCINE AGE 60>  Completed    Pneumococcal 65+ Low/Medium Risk  Completed       *Patient verbalized understanding and agreement with the plan. A copy of the After Visit Summary with personalized health plan was given to the patient today.

## 2018-08-20 NOTE — PATIENT INSTRUCTIONS
Schedule of Personalized Health Plan  (Provide Copy to Patient)  The best way to stay healthy is to live a healthy lifestyle. A healthy lifestyle includes regular exercise, eating a well-balanced diet, keeping a healthy weight and not smoking. Regular physical exams and screening tests are another important way to take care of yourself. Preventive exams provided by health care providers can find health problems early when treatment works best and can keep you from getting certain diseases or illnesses. Preventive services include exams, lab tests, screenings, shots, monitoring and information to help you take care of your own health. All people over 65 should have a pneumonia shot. Pneumonia shots are usually only needed once in a lifetime unless your doctor decides differently. All people over 65 should have a yearly flu shot. People over 65 are at medium to high risk for Hepatitis B. Three shots are needed for complete protection. In addition to your physical exam, some screening tests are recommended:    Bone mass measurement (dexa scan) is recommended every two years if you have certain risk factors, such as personal history of vertebral fracture or chronic steroid medication use    Diabetes Mellitus screening is recommended every year. Glaucoma is an eye disease caused by high pressure in the eye. An eye exam is recommended every year. Cardiovascular screening tests that check your cholesterol and other blood fat (lipid) levels are recommended every five years. Colorectal Cancer screening tests help to find pre-cancerous polyps (growths in the colon) so they can be removed before they turn into cancer. Tests ordered for screening depend on your personal and family history risk factors.     Screening for Prostate Cancer is recommended yearly with a digital rectal exam and/or a PSA test    Here is a list of your current Health Maintenance items with a due date:  Health Maintenance Topic Date Due    Bone Densitometry (Dexa) Screening  11/14/1996    EYE EXAM RETINAL OR DILATED Q1  08/11/2017    MICROALBUMIN Q1  06/09/2018    Influenza Age 9 to Adult  08/01/2018    GLAUCOMA SCREENING Q2Y  08/11/2018    HEMOGLOBIN A1C Q6M  10/27/2018    FOOT EXAM Q1  12/01/2018    LIPID PANEL Q1  04/27/2019    MEDICARE YEARLY EXAM  08/21/2019    DTaP/Tdap/Td series (2 - Td) 06/16/2027    ZOSTER VACCINE AGE 60>  Completed    Pneumococcal 65+ Low/Medium Risk  Completed

## 2018-08-20 NOTE — PROGRESS NOTES
Health Maintenance Due   Topic Date Due    Bone Densitometry (Dexa) Screening  11/14/1996    EYE EXAM RETINAL OR DILATED Q1  08/11/2017    MICROALBUMIN Q1  06/09/2018    MEDICARE YEARLY EXAM  06/17/2018    Influenza Age 5 to Adult  08/01/2018    GLAUCOMA SCREENING Q2Y  08/11/2018       Chief Complaint   Patient presents with    Annual Wellness Visit     Bone Density test due       1. Have you been to the ER, urgent care clinic since your last visit? Hospitalized since your last visit? Yes, When: Approx. End of July, Where [facility] Reason: Food lodged in throat  2. Have you seen or consulted any other health care providers outside of the [employer] [facility] since your last visit? Include any pap smears or colon screening. No    3) Do you have an Advance Directive on file? no    4) Are you interested in receiving information on Advance Directives? NO      Patient is accompanied by daughter I have received verbal consent from Katharina Martinez to discuss any/all medical information while they are present in the room.

## 2018-08-27 ENCOUNTER — TELEPHONE (OUTPATIENT)
Dept: INTERNAL MEDICINE CLINIC | Age: 83
End: 2018-08-27

## 2018-08-27 NOTE — TELEPHONE ENCOUNTER
Patient needs incontinent supplies- pack of depends  She wants to get this through her insurance- [employer]  (Medicaid/medicare)

## 2018-08-28 NOTE — TELEPHONE ENCOUNTER
Call placed to [facility] and left message regarding Home Care delivered, when signed by MD will fax

## 2018-09-14 ENCOUNTER — APPOINTMENT (OUTPATIENT)
Dept: GENERAL RADIOLOGY | Age: 83
End: 2018-09-14
Attending: EMERGENCY MEDICINE
Payer: MEDICARE

## 2018-09-14 ENCOUNTER — HOSPITAL ENCOUNTER (EMERGENCY)
Age: 83
Discharge: HOME | End: 2018-09-14
Attending: EMERGENCY MEDICINE
Payer: MEDICARE

## 2018-09-14 ENCOUNTER — TELEPHONE (OUTPATIENT)
Dept: INTERNAL MEDICINE CLINIC | Age: 83
End: 2018-09-14

## 2018-09-14 VITALS
BODY MASS INDEX: 18.58 KG/M2 | WEIGHT: 101 LBS | OXYGEN SATURATION: 96 % | SYSTOLIC BLOOD PRESSURE: 129 MMHG | TEMPERATURE: 98.5 F | DIASTOLIC BLOOD PRESSURE: 63 MMHG | HEIGHT: 62 IN | RESPIRATION RATE: 16 BRPM | HEART RATE: 89 BPM

## 2018-09-14 DIAGNOSIS — S62.102A CLOSED FRACTURE OF LEFT WRIST, INITIAL ENCOUNTER: Primary | ICD-10-CM

## 2018-09-14 DIAGNOSIS — S52.502A CLOSED FRACTURE OF DISTAL END OF LEFT RADIUS, UNSPECIFIED FRACTURE MORPHOLOGY, INITIAL ENCOUNTER: Primary | ICD-10-CM

## 2018-09-14 PROCEDURE — 74011250636 HC RX REV CODE- 250/636: Performed by: PHYSICIAN ASSISTANT

## 2018-09-14 PROCEDURE — 73100 X-RAY EXAM OF WRIST: CPT

## 2018-09-14 PROCEDURE — 75810000303 HC CLSD TRMT  FRACTURE/DISLOCATION W/  ANES

## 2018-09-14 PROCEDURE — 73110 X-RAY EXAM OF WRIST: CPT

## 2018-09-14 PROCEDURE — 99285 EMERGENCY DEPT VISIT HI MDM: CPT

## 2018-09-14 PROCEDURE — A4565 SLINGS: HCPCS

## 2018-09-14 PROCEDURE — 74011250637 HC RX REV CODE- 250/637: Performed by: EMERGENCY MEDICINE

## 2018-09-14 RX ORDER — HYDROCODONE BITARTRATE AND ACETAMINOPHEN 5; 325 MG/1; MG/1
1 TABLET ORAL
Qty: 15 TAB | Refills: 0 | Status: SHIPPED | OUTPATIENT
Start: 2018-09-14 | End: 2019-05-31 | Stop reason: ALTCHOICE

## 2018-09-14 RX ORDER — LIDOCAINE HYDROCHLORIDE 10 MG/ML
10 INJECTION INFILTRATION; PERINEURAL ONCE
Status: COMPLETED | OUTPATIENT
Start: 2018-09-14 | End: 2018-09-14

## 2018-09-14 RX ORDER — HYDROCODONE BITARTRATE AND ACETAMINOPHEN 5; 325 MG/1; MG/1
1 TABLET ORAL
Status: COMPLETED | OUTPATIENT
Start: 2018-09-14 | End: 2018-09-14

## 2018-09-14 RX ADMIN — LIDOCAINE HYDROCHLORIDE 10 ML: 10 INJECTION, SOLUTION INFILTRATION; PERINEURAL at 11:02

## 2018-09-14 RX ADMIN — HYDROCODONE BITARTRATE AND ACETAMINOPHEN 1 TABLET: 5; 325 TABLET ORAL at 10:00

## 2018-09-14 NOTE — CONSULTS
ORTHOPAEDIC CONSULT NOTE    Subjective:     Date of Consultation:  September 14, 2018  Referring Physician:  Alexandro Barbosa is a 80 y.o. female with PMH significant for HTN, DM, CVA with residual left sided weakness is seen for left wrist pain following a fall out of bed about 2 days ago when she tried to catch herself. Patient does not remember falling but daughter reports that she did. They thought the wrist pain was a sprain and tried soaking it in epsom salts but the discomfort continued and she was having trouble using the arm. She was brought to the ED and found to have a distal radius fracture for which we have been asked to see the patient. She describes pain with movement of the arm but denies any tingling or numbness. She is right hand dominant and uses no assistive devices for walking. Patient Active Problem List    Diagnosis Date Noted    Mild depression ([facility] [address] 08/20/2018    Medicare annual wellness visit, subsequent 08/20/2018    Depression 02/02/2018    Chronic anemia 12/01/2017    Chronic diarrhea 12/16/2016    ACP (advance care planning) 12/16/2016    Memory impairment 12/16/2016    Type 2 diabetes mellitus without complication ([address] 11/30/0044    Essential hypertension 04/17/2015    Hypercholesterolemia 04/17/2015    CVA, old, cognitive deficits 04/17/2015    Insomnia 04/17/2015    Cataracts, bilateral 04/17/2015     Family History   Problem Relation Age of Onset    No Known Problems Mother     No Known Problems Father       Social History   Substance Use Topics    Smoking status: Former Smoker     Packs/day: 0.25     Quit date: 1/1/2012    Smokeless tobacco: Never Used    Alcohol use No     Past Medical History:   Diagnosis Date    Anemia     Depression     Diabetes ([address]     Hypercholesterolemia     Hypertension     Stroke ([facility] [address] 09/2012    CVA; left sided weakness      No past surgical history on file.    Prior to Admission medications    Medication Sig Start Date End Date Taking? Authorizing Provider   omeprazole (PRILOSEC) 20 mg capsule Take 1 Cap by mouth daily. Indications: gastroesophageal reflux disease 8/3/18   Tiago Smith MD   amLODIPine (NORVASC) 10 mg tablet TAKE ONE TABLET BY MOUTH EVERY DAY 6/19/18   Marcello Belkis, DO   atorvastatin (LIPITOR) 40 mg tablet TAKE ONE TABLET BY MOUTH EVERY DAY 6/19/18   Marcello Belkis, DO   cloNIDine HCl (CATAPRES) 0.1 mg tablet TAKE ONE TABLET BY MOUTH AT BEDTIME 6/19/18   Marcello Belkis, DO   ferrous sulfate 325 mg (65 mg iron) tablet TAKE ONE TABLET BY MOUTH EVERY MORNING BEFORE BREAKFAST 6/19/18   Yael Ou, DO   geriatric multivitamins & minerals (ELDERTONIC) elix Take 15 mL by mouth Before breakfast, lunch, and dinner. 6/19/18   Marcello Tamayo DO   losartan-hydroCHLOROthiazide (HYZAAR) 100-25 mg per tablet TAKE ONE TABLET BY MOUTH EVERY DAY 6/19/18   Marcello Belkis DO   metFORMIN (GLUCOPHAGE) 500 mg tablet TAKE ONE TABLET BY MOUTH TWO TIMES A DAY WITH MEALS 6/19/18   Marcello Belkis DO   mirtazapine (REMERON) 7.5 mg tablet Take 1 Tab by mouth nightly. 6/19/18   Marcello Tamayo DO   potassium chloride (K-DUR, KLOR-CON) 20 mEq tablet TAKE TWO TABLETS BY MOUTH EVERY DAY 6/19/18   Yael Ou, DO   aspirin delayed-release 81 mg tablet Take 1 Tab by mouth daily. 5/4/18   Marcello Tamayo DO   acetaminophen 650 mg Tab Take 650 mg by mouth every six (6) hours as needed. 9/21/12   Paul Herrera MD     Current Facility-Administered Medications   Medication Dose Route Frequency    lidocaine (XYLOCAINE) 10 mg/mL (1 %) injection 10 mL  10 mL SubCUTAneous ONCE     Current Outpatient Prescriptions   Medication Sig    omeprazole (PRILOSEC) 20 mg capsule Take 1 Cap by mouth daily.  Indications: gastroesophageal reflux disease    amLODIPine (NORVASC) 10 mg tablet TAKE ONE TABLET BY MOUTH EVERY DAY    atorvastatin (LIPITOR) 40 mg tablet TAKE ONE TABLET BY MOUTH EVERY DAY    cloNIDine HCl (CATAPRES) 0.1 mg tablet TAKE ONE TABLET BY MOUTH AT BEDTIME    ferrous sulfate 325 mg (65 mg iron) tablet TAKE ONE TABLET BY MOUTH EVERY MORNING BEFORE BREAKFAST    geriatric multivitamins & minerals (ELDERTONIC) elix Take 15 mL by mouth Before breakfast, lunch, and dinner.  losartan-hydroCHLOROthiazide (HYZAAR) 100-25 mg per tablet TAKE ONE TABLET BY MOUTH EVERY DAY    metFORMIN (GLUCOPHAGE) 500 mg tablet TAKE ONE TABLET BY MOUTH TWO TIMES A DAY WITH MEALS    mirtazapine (REMERON) 7.5 mg tablet Take 1 Tab by mouth nightly.  potassium chloride (K-DUR, KLOR-CON) 20 mEq tablet TAKE TWO TABLETS BY MOUTH EVERY DAY    aspirin delayed-release 81 mg tablet Take 1 Tab by mouth daily.  acetaminophen 650 mg Tab Take 650 mg by mouth every six (6) hours as needed. No Known Allergies     Review of Systems:  Pertinent items are noted in HPI. Mental Status: mild dementia    Objective:     Patient Vitals for the past 8 hrs:   BP Temp Pulse Resp SpO2 Height Weight   18 1000 145/68 - - - 98 % - -   18 0923 175/77 98.6 °F (37 °C) (!) 104 16 97 % 5' 2\" (1.575 m) 45.8 kg (101 lb)     Temp (24hrs), Av.6 °F (37 °C), Min:98.6 °F (37 °C), Max:98.6 °F (37 °C)      EXAM: Awake and alert lying on stretcher; NAD; agreeable to exam  Left wrist with visible dorsal deformity and palpable fracture stepoff  TTP about the distal wrist  Moves all fingers to command   Distal sensory function grossly intact  Moves all fingers to command  Capillary refill brisk    Imaging Review: EXAM: XR WRIST LT AP/LAT/OBL MIN 3V     INDICATION:  fall, left wrist injury, eval for fracture.     COMPARISON: None.     FINDINGS: Three  views of the left wrist demonstrate comminuted fracture of the  distal radial metaphysis with possible extension to the articular surface. Fracture of the ulnar styloid process. There is white apex anterior angulation. Diffuse osteopenia. Soft tissue swelling about the wrist.     IMPRESSION  IMPRESSION:    1.  Comminuted fracture through the distal radial metaphysis with possible  articular extension. There is apex anterior angulation. 2. Ulnar styloid process fracture. Labs: No results found for this or any previous visit (from the past 24 hour(s)). Impression:     Active Problems:    * No active hospital problems. *      Plan:     Acute left distal radius fracture - will require reduction and immobilization  Hematoma block  Sugar tong splint  Sling for elevation and comfort  Elevate limb when resting  Post-reduction images show minimal movement on reduction likely due to fracture occurring 2 days ago.   Will need office follow-up with Dr Susi Guy next week for surgical consideration    Dr Baldo Haney aware of patient and agrees with current plan of care    Pilar Burdick PA-C  Orthopedic Trauma Service  [address] [facility] [place]

## 2018-09-14 NOTE — TELEPHONE ENCOUNTER
Please put a referral req in for DR. Tiesha Wilson at [facility] pt broke left wrist and was referred by [facility]. Pt going Monday at 10:40.  Please give to Ashleigh Ott for [employer] referral when completed

## 2018-09-14 NOTE — ED PROVIDER NOTES
HPI Comments: 80 y.o. R-handed female with past medical history significant for HTN, diabetes, CVA, and depression who presents from home via private vehicle with chief complaint of wrist pain s/p fall. The pt's daughter states 2 days ago the pt accidentally rolled out of bed, and tried to catch herself with her L arm. Pt reports L wrist pain, swelling, redness, and warmth, gradually worsening since the accident. Daughter reports soaking the wrist in [place] baths without relief. Pt denies any other injuries from the fall. There are no other acute medical concerns at this time. Social hx: Former smoker (Quit 2012); No EtOH use PCP: Zarina Vernon DO Note written by Sonido Fierro, as dictated by Courtney Griffith MD 9:30 AM 
 
The history is provided by the patient and a relative (Daughter). No  was used. Past Medical History:  
Diagnosis Date  Anemia  Depression  Diabetes ([facility] [address]  Hypercholesterolemia  Hypertension  Stroke [facility]) 09/2012 CVA; left sided weakness No past surgical history on file. Family History:  
Problem Relation Age of Onset  No Known Problems Mother  No Known Problems Father Social History Social History  Marital status:  Spouse name: N/A  
 Number of children: N/A  
 Years of education: N/A Occupational History  Not on file. Social History Main Topics  Smoking status: Former Smoker Packs/day: 0.25 Quit date: 1/1/2012  Smokeless tobacco: Never Used  Alcohol use No  
 Drug use: No  
 Sexual activity: Not Currently Comment: has one grown child Other Topics Concern  Not on file Social History Narrative ALLERGIES: Review of patient's allergies indicates no known allergies. Review of Systems Constitutional: Negative for fever. HENT: Negative for facial swelling and nosebleeds. Eyes: Negative for pain. Respiratory: Negative for cough, chest tightness and shortness of breath. Cardiovascular: Negative for chest pain and leg swelling. Gastrointestinal: Negative for abdominal pain, diarrhea and vomiting. Endocrine: Negative for polyuria. Genitourinary: Negative for difficulty urinating and flank pain. Musculoskeletal: Positive for arthralgias and joint swelling. Negative for back pain. Skin: Positive for color change. Allergic/Immunologic: Negative for immunocompromised state. Neurological: Negative for dizziness and headaches. Hematological: Does not bruise/bleed easily. Psychiatric/Behavioral: Negative for agitation. All other systems reviewed and are negative. Vitals:  
 09/14/18 1715 BP: 175/77 Pulse: (!) 104 Resp: 16 Temp: 98.6 °F (37 °C) SpO2: 97% Weight: 45.8 kg (101 lb) Height: 5' 2\" (1.575 m) Physical Exam  
Constitutional: She is oriented to person, place, and time. She appears well-developed and well-nourished. HENT:  
Head: Normocephalic and atraumatic. Right Ear: External ear normal.  
Left Ear: External ear normal.  
Nose: Nose normal.  
Mouth/Throat: Oropharynx is clear and moist.  
Eyes: EOM are normal. Pupils are equal, round, and reactive to light. No scleral icterus. Neck: Normal range of motion. Neck supple. No JVD present. No tracheal deviation present. No thyromegaly present. Cardiovascular: Normal rate, regular rhythm, normal heart sounds and intact distal pulses. Exam reveals no friction rub. No murmur heard. Pulmonary/Chest: Effort normal and breath sounds normal. No stridor. No respiratory distress. She has no wheezes. She has no rales. She exhibits no tenderness. Abdominal: Soft. Bowel sounds are normal. She exhibits no distension. There is no tenderness. There is no rebound and no guarding. Musculoskeletal: Normal range of motion. She exhibits no edema. Left wrist: She exhibits bony tenderness, swelling and deformity. L wrist with warm and swelling, diffuse tenderness to palpation, and appears to be dorsally deformed. Lymphadenopathy:  
  She has no cervical adenopathy. Neurological: She is alert and oriented to person, place, and time. She has normal reflexes. No cranial nerve deficit. Coordination normal.  
Skin: Skin is warm and dry. No rash noted. No erythema. Psychiatric: She has a normal mood and affect. Her behavior is normal. Judgment and thought content normal.  
Nursing note and vitals reviewed. Note written by Sonido Hogan, as dictated by Antonino Miller MD 9:30 AM 
 
MDM Number of Diagnoses or Management Options Diagnosis management comments: 15-year-old female presents after a fall. Patient fell 2 nights ago. She injured her left wrist. The left wrist has been warm and swollen. It appears to be deformed. Concern for fracture. We'll check x-rays. We'll apply ice. Will get pain medication. We'll reassess after x-rays. Patient and daughter agree. Amount and/or Complexity of Data Reviewed Tests in the radiology section of CPT®: ordered and reviewed Decide to obtain previous medical records or to obtain history from someone other than the patient: yes Obtain history from someone other than the patient: yes Review and summarize past medical records: yes Independent visualization of images, tracings, or specimens: yes ED Course Procedures 10:09 AM  
X-Ray shows fractures. Sent images via mokono to Dr. Azra Moreira and Xi Alejandre PA-C. Removed the pt's rings from L hand. Ice is currently applied, pt states her pain is improved. 10:26 AM 
Xi Alejandre will down shortly to see the pt.  
 
10:42 AM 
Eric in ED. 11:51 AM 
Xi Alejandre is finished with his reduction of the L wrist, and would like a post-reduction film. Will order. Eric PAC did splint I checked wrist splint and in good position and pt NV intact after splinting 12:24 PM 
Reviewed pt's post-reductions films, which appear better. Plan to discharge home with F/U with Dr. Magaly Aldana for Ortho.

## 2018-09-14 NOTE — DISCHARGE INSTRUCTIONS
Broken Arm: Care Instructions  Your Care Instructions  Fractures can range from a small, hairline crack, to a bone or bones broken into two or more pieces. Your treatment depends on how bad the break is. Your doctor may have put your arm in a splint or cast to allow it to heal or to keep it stable until you see another doctor. It may take weeks or months for your arm to heal. You can help your arm heal with some care at home. You heal best when you take good care of yourself. Eat a variety of healthy foods, and don't smoke. You may have had a sedative to help you relax. You may be unsteady after having sedation. It can take a few hours for the medicine's effects to wear off. Common side effects of sedation include nausea, vomiting, and feeling sleepy or tired. The doctor has checked you carefully, but problems can develop later. If you notice any problems or new symptoms, get medical treatment right away. Follow-up care is a key part of your treatment and safety. Be sure to make and go to all appointments, and call your doctor if you are having problems. It's also a good idea to know your test results and keep a list of the medicines you take. How can you care for yourself at home? · If the doctor gave you a sedative:  ¨ For 24 hours, don't do anything that requires attention to detail. It takes time for the medicine's effects to completely wear off. ¨ For your safety, do not drive or operate any machinery that could be dangerous. Wait until the medicine wears off and you can think clearly and react easily. · Put ice or a cold pack on your arm for 10 to 20 minutes at a time. Try to do this every 1 to 2 hours for the next 3 days (when you are awake). Put a thin cloth between the ice and your cast or splint. Keep the cast or splint dry. · Follow the cast care instructions your doctor gives you. If you have a splint, do not take it off unless your doctor tells you to. · Be safe with medicines.  Take pain medicines exactly as directed. ¨ If the doctor gave you a prescription medicine for pain, take it as prescribed. ¨ If you are not taking a prescription pain medicine, ask your doctor if you can take an over-the-counter medicine. · Prop up your arm on pillows when you sit or lie down in the first few days after the injury. Keep the arm higher than the level of your heart. This will help reduce swelling. · Follow instructions for exercises to keep your arm strong. · Wiggle your fingers and wrist often to reduce swelling and stiffness. When should you call for help? Call 911 anytime you think you may need emergency care. For example, call if:    · You are very sleepy and you have trouble waking up.    Call your doctor now or seek immediate medical care if:    · You have new or worse nausea or vomiting.     · You have new or worse pain.     · Your hand or fingers are cool or pale or change color.     · Your cast or splint feels too tight.     · You have tingling, weakness, or numbness in your hand or fingers.    Watch closely for changes in your health, and be sure to contact your doctor if:    · You do not get better as expected.     · You have problems with your cast or splint. Where can you learn more? Go to http://marylou-burke.info/. Enter R526 in the search box to learn more about \"Broken Arm: Care Instructions. \"  Current as of: November 29, 2017  Content Version: 11.7  © 6287-0025 [employer]. Care instructions adapted under license by [employer] (which disclaims liability or warranty for this information). If you have questions about a medical condition or this instruction, always ask your healthcare professional. [address] any warranty or liability for your use of this information.

## 2018-09-14 NOTE — ED NOTES
Verbal report received and assumed care from [facility] , off-going nurse. Report included SBAR, Kardex, MAR, recent results and pt status. Pt resting comfortably in bed.

## 2018-09-14 NOTE — ED TRIAGE NOTES
Daughter reports pt fell out of bed Wednesday morning injuring her left forearm. Pt has noted swelling with redness and warmth to touch.

## 2018-10-02 ENCOUNTER — TELEPHONE (OUTPATIENT)
Dept: INTERNAL MEDICINE CLINIC | Age: 83
End: 2018-10-02

## 2018-10-02 NOTE — TELEPHONE ENCOUNTER
Returned patients call, no answering , unable to leave message. Discussed w/Provider Shankar, states pt no longer needs to have mammograms done. Will contact patient again.

## 2018-10-02 NOTE — TELEPHONE ENCOUNTER
Mrs Zarina Bowden called. Wanted to know at what age do women stop getting mammograms.  Her mother is almost 80

## 2018-10-09 ENCOUNTER — TELEPHONE (OUTPATIENT)
Dept: INTERNAL MEDICINE CLINIC | Age: 83
End: 2018-10-09

## 2018-10-09 NOTE — TELEPHONE ENCOUNTER
Call placed to pt Mushtaq longoria, and left VM stating not necessary but if pt wants to have we can order

## 2018-10-09 NOTE — TELEPHONE ENCOUNTER
Is it necessary to schedule a mammogram for Ms Sandee Rome?  Please contact Aneudy Andrea to let her know  [phone]

## 2018-10-31 DIAGNOSIS — E11.9 TYPE 2 DIABETES MELLITUS WITHOUT COMPLICATION, WITHOUT LONG-TERM CURRENT USE OF INSULIN (HCC): ICD-10-CM

## 2018-10-31 DIAGNOSIS — D64.9 CHRONIC ANEMIA: ICD-10-CM

## 2018-11-04 DIAGNOSIS — E11.9 TYPE 2 DIABETES MELLITUS WITHOUT COMPLICATION, WITHOUT LONG-TERM CURRENT USE OF INSULIN (HCC): ICD-10-CM

## 2018-11-07 LAB
ALT SERPL-CCNC: 7 IU/L (ref 0–32)
AST SERPL-CCNC: 13 IU/L (ref 0–40)
BASOPHILS # BLD AUTO: 0 X10E3/UL (ref 0–0.2)
BASOPHILS NFR BLD AUTO: 0 %
BUN SERPL-MCNC: 17 MG/DL (ref 8–27)
BUN/CREAT SERPL: 19 (ref 12–28)
CALCIUM SERPL-MCNC: 9.7 MG/DL (ref 8.7–10.3)
CHLORIDE SERPL-SCNC: 103 MMOL/L (ref 96–106)
CHOLEST SERPL-MCNC: 117 MG/DL (ref 100–199)
CO2 SERPL-SCNC: 22 MMOL/L (ref 20–29)
CREAT SERPL-MCNC: 0.9 MG/DL (ref 0.57–1)
EOSINOPHIL # BLD AUTO: 0.1 X10E3/UL (ref 0–0.4)
EOSINOPHIL NFR BLD AUTO: 3 %
ERYTHROCYTE [DISTWIDTH] IN BLOOD BY AUTOMATED COUNT: 15.7 % (ref 12.3–15.4)
GLUCOSE SERPL-MCNC: 153 MG/DL (ref 65–99)
HCT VFR BLD AUTO: 31 % (ref 34–46.6)
HDLC SERPL-MCNC: 47 MG/DL
HGB BLD-MCNC: 9.9 G/DL (ref 11.1–15.9)
IMM GRANULOCYTES # BLD: 0 X10E3/UL (ref 0–0.1)
IMM GRANULOCYTES NFR BLD: 0 %
INTERPRETATION, 910389: NORMAL
INTERPRETATION: NORMAL
LDLC SERPL CALC-MCNC: 58 MG/DL (ref 0–99)
LYMPHOCYTES # BLD AUTO: 1 X10E3/UL (ref 0.7–3.1)
LYMPHOCYTES NFR BLD AUTO: 23 %
Lab: NORMAL
MCH RBC QN AUTO: 29.2 PG (ref 26.6–33)
MCHC RBC AUTO-ENTMCNC: 31.9 G/DL (ref 31.5–35.7)
MCV RBC AUTO: 91 FL (ref 79–97)
MONOCYTES # BLD AUTO: 0.2 X10E3/UL (ref 0.1–0.9)
MONOCYTES NFR BLD AUTO: 5 %
NEUTROPHILS # BLD AUTO: 3.2 X10E3/UL (ref 1.4–7)
NEUTROPHILS NFR BLD AUTO: 69 %
PDF IMAGE, 910387: NORMAL
PLATELET # BLD AUTO: 370 X10E3/UL (ref 150–379)
POTASSIUM SERPL-SCNC: 3.7 MMOL/L (ref 3.5–5.2)
RBC # BLD AUTO: 3.39 X10E6/UL (ref 3.77–5.28)
SODIUM SERPL-SCNC: 139 MMOL/L (ref 134–144)
TRIGL SERPL-MCNC: 62 MG/DL (ref 0–149)
VLDLC SERPL CALC-MCNC: 12 MG/DL (ref 5–40)
WBC # BLD AUTO: 4.6 X10E3/UL (ref 3.4–10.8)

## 2018-11-13 ENCOUNTER — OFFICE VISIT (OUTPATIENT)
Dept: INTERNAL MEDICINE CLINIC | Age: 83
End: 2018-11-13

## 2018-11-13 VITALS
OXYGEN SATURATION: 99 % | TEMPERATURE: 97.1 F | BODY MASS INDEX: 18.4 KG/M2 | HEIGHT: 62 IN | WEIGHT: 100 LBS | SYSTOLIC BLOOD PRESSURE: 122 MMHG | HEART RATE: 68 BPM | RESPIRATION RATE: 16 BRPM | DIASTOLIC BLOOD PRESSURE: 60 MMHG

## 2018-11-13 DIAGNOSIS — F32.A DEPRESSION, UNSPECIFIED DEPRESSION TYPE: ICD-10-CM

## 2018-11-13 DIAGNOSIS — E11.9 TYPE 2 DIABETES MELLITUS WITHOUT COMPLICATION, WITHOUT LONG-TERM CURRENT USE OF INSULIN (HCC): Primary | ICD-10-CM

## 2018-11-13 DIAGNOSIS — I10 ESSENTIAL HYPERTENSION: ICD-10-CM

## 2018-11-13 DIAGNOSIS — Z23 ENCOUNTER FOR IMMUNIZATION: ICD-10-CM

## 2018-11-13 DIAGNOSIS — I69.319 CVA, OLD, COGNITIVE DEFICITS: ICD-10-CM

## 2018-11-13 DIAGNOSIS — D64.9 CHRONIC ANEMIA: ICD-10-CM

## 2018-11-13 NOTE — PROGRESS NOTES
Patient identified with 2 ID's, Name and Donell Olivier is a 80 y.o. female  Chief Complaint   Patient presents with    Diabetes     follow up appointment   [facility] Follow Up     fall on 9-14-18 seen at [facility] ED for closed fracture distal end left radius       1. Have you been to the ER, urgent care clinic since your last visit? Hospitalized since your last visit? Yes [facility] ED for fall resulting in closed fracture distal end left radius on 9-14-18    2. Have you seen or consulted any other health care providers outside of the [facility] [place] [address] since your last visit? Include any pap smears or colon screening. N    Health Maintenance   Topic Date Due    Shingrix Vaccine Age 49> (1 of 2) 11/14/1981    Bone Densitometry (Dexa) Screening  11/14/1996    EYE EXAM RETINAL OR DILATED Q1  08/11/2017    GLAUCOMA SCREENING Q2Y  08/11/2018    HEMOGLOBIN A1C Q6M  10/27/2018    MICROALBUMIN Q1  08/20/2019    MEDICARE YEARLY EXAM  08/21/2019    LIPID PANEL Q1  11/06/2019    FOOT EXAM Q1  11/13/2019    DTaP/Tdap/Td series (2 - Td) 06/16/2027    Pneumococcal 65+ Low/Medium Risk  Completed    Influenza Age 5 to Adult  Completed   shingrix-recommended, bone density-recommend ed, eye exam/glaucoma screening-recommended, flu vaccine-recommended, A1c add to next lavs, foot exam -pt reports no blisters or sores at this time    In the event something were to happen to you and you were unable to speak on your behalf, do you have an Advance Directive/ Living Will in place stating your wishes? No      If no, would you like information?  declined    Visit Vitals  /60 (BP 1 Location: Left arm, BP Patient Position: Sitting)   Pulse 68   Temp 97.1 °F (36.2 °C) (Oral)   Resp 16   Ht 5' 2\" (1.575 m)   Wt 100 lb (45.4 kg)   SpO2 99%   BMI 18.29 kg/m²       Medication Reconciliation reviewed with patient on this date    Fall Risk Assessment, last 12 mths 8/20/2018   Able to walk? Yes   Fall in past 12 months?  No Fall with injury? -   Number of falls in past 12 months -   Fall Risk Score -       PHQ over the last two weeks 8/20/2018   Little interest or pleasure in doing things Not at all   Feeling down, depressed, irritable, or hopeless Not at all   Total Score PHQ 2 0   Trouble falling or staying asleep, or sleeping too much Several days   Feeling tired or having little energy Several days   Poor appetite, weight loss, or overeating Several days   Feeling bad about yourself - or that you are a failure or have let yourself or your family down Not at all   Trouble concentrating on things such as school, work, reading, or watching TV More than half the days   Moving or speaking so slowly that other people could have noticed; or the opposite being so fidgety that others notice Not at all   Thoughts of being better off dead, or hurting yourself in some way Not at all   PHQ 9 Score 5       Learning Assessment 4/17/2015   PRIMARY LEARNER Sibling(s)   BARRIERS PRIMARY LEARNER COGNITIVE   PRIMARY LANGUAGE ENGLISH   LEARNER PREFERENCE PRIMARY OTHER (COMMENT)   ANSWERED BY daughter   RELATIONSHIP OTHER       Abuse Screening Questionnaire 8/20/2018   Do you ever feel afraid of your partner? N   Are you in a relationship with someone who physically or mentally threatens you? N   Is it safe for you to go home? Y     The patient presents for routine flu immunization, denies any symptoms, reactions or allergies that would exclude them from being immunized today. Risks and adverse reactions were discussed and the VIS was given to them. Vaccine administered to right deltoid. All questions were addressed. Patient was observed 15 min post injection. There were no reactions observed. No reactions noted.

## 2018-11-13 NOTE — PATIENT INSTRUCTIONS
Vaccine Information Statement    Influenza (Flu) Vaccine (Inactivated or Recombinant): What you need to know    Many Vaccine Information Statements are available in Setsw[place] and other languages. See www.immunize.org/vis  Hojas de Información Sobre Vacunas están disponibles en Español y en muchos otros idiomas. Visite www.immunize.org/vis    1. Why get vaccinated? Influenza (flu) is a contagious disease that spreads around the [place] every year, usually between October and May. Flu is caused by influenza viruses, and is spread mainly by coughing, sneezing, and close contact. Anyone can get flu. Flu strikes suddenly and can last several days. Symptoms vary by age, but can include:   fever/chills   sore throat   muscle aches   fatigue   cough   headache    runny or stuffy nose    Flu can also lead to pneumonia and blood infections, and cause diarrhea and seizures in children. If you have a medical condition, such as heart or lung disease, flu can make it worse. Flu is more dangerous for some people. Infants and young children, people 72years of age and older, pregnant women, and people with certain health conditions or a weakened immune system are at greatest risk. Each year thousands of people in the [facility] die from flu, and many more are hospitalized. Flu vaccine can:   keep you from getting flu,   make flu less severe if you do get it, and   keep you from spreading flu to your family and other people. 2. Inactivated and recombinant flu vaccines    A dose of flu vaccine is recommended every flu season. Children 6 months through 6years of age may need two doses during the same flu season. Everyone else needs only one dose each flu season.        Some inactivated flu vaccines contain a very small amount of a mercury-based preservative called thimerosal. Studies have not shown thimerosal in vaccines to be harmful, but flu vaccines that do not contain thimerosal are available. There is no live flu virus in flu shots. They cannot cause the flu. There are many flu viruses, and they are always changing. Each year a new flu vaccine is made to protect against three or four viruses that are likely to cause disease in the upcoming flu season. But even when the vaccine doesnt exactly match these viruses, it may still provide some protection    Flu vaccine cannot prevent:   flu that is caused by a virus not covered by the vaccine, or   illnesses that look like flu but are not. It takes about 2 weeks for protection to develop after vaccination, and protection lasts through the flu season. 3. Some people should not get this vaccine    Tell the person who is giving you the vaccine:     If you have any severe, life-threatening allergies. If you ever had a life-threatening allergic reaction after a dose of flu vaccine, or have a severe allergy to any part of this vaccine, you may be advised not to get vaccinated. Most, but not all, types of flu vaccine contain a small amount of egg protein.  If you ever had Guillain-Barré Syndrome (also called GBS). Some people with a history of GBS should not get this vaccine. This should be discussed with your doctor.  If you are not feeling well. It is usually okay to get flu vaccine when you have a mild illness, but you might be asked to come back when you feel better. 4. Risks of a vaccine reaction    With any medicine, including vaccines, there is a chance of reactions. These are usually mild and go away on their own, but serious reactions are also possible. Most people who get a flu shot do not have any problems with it.      Minor problems following a flu shot include:    soreness, redness, or swelling where the shot was given     hoarseness   sore, red or itchy eyes   cough   fever   aches   headache   itching   fatigue  If these problems occur, they usually begin soon after the shot and last 1 or 2 days. More serious problems following a flu shot can include the following:     There may be a small increased risk of Guillain-Barré Syndrome (GBS) after inactivated flu vaccine. This risk has been estimated at 1 or 2 additional cases per million people vaccinated. This is much lower than the risk of severe complications from flu, which can be prevented by flu vaccine.  Young children who get the flu shot along with pneumococcal vaccine (PCV13) and/or DTaP vaccine at the same time might be slightly more likely to have a seizure caused by fever. Ask your doctor for more information. Tell your doctor if a child who is getting flu vaccine has ever had a seizure. Problems that could happen after any injected vaccine:      People sometimes faint after a medical procedure, including vaccination. Sitting or lying down for about 15 minutes can help prevent fainting, and injuries caused by a fall. Tell your doctor if you feel dizzy, or have vision changes or ringing in the ears.  Some people get severe pain in the shoulder and have difficulty moving the arm where a shot was given. This happens very rarely.  Any medication can cause a severe allergic reaction. Such reactions from a vaccine are very rare, estimated at about 1 in a million doses, and would happen within a few minutes to a few hours after the vaccination. As with any medicine, there is a very remote chance of a vaccine causing a serious injury or death. The safety of vaccines is always being monitored. For more information, visit: www.cdc.gov/vaccinesafety/    5. What if there is a serious reaction? What should I look for?  Look for anything that concerns you, such as signs of a severe allergic reaction, very high fever, or unusual behavior.     Signs of a severe allergic reaction can include hives, swelling of the face and throat, difficulty breathing, a fast heartbeat, dizziness, and weakness - usually within a few minutes to a few hours after the vaccination. What should I do?  If you think it is a severe allergic reaction or other emergency that cant wait, call 9-1-1 and get the person to the nearest hospital. Otherwise, call your doctor.  Reactions should be reported to the Vaccine Adverse Event Reporting System (VAERS). Your doctor should file this report, or you can do it yourself through  the VAERS web site at www.vaers. [facility].gov, or by calling 4-619.588.5944. VAERS does not give medical advice. 6. The National Vaccine Injury Compensation Program    The [facility] Vaccine Injury Compensation Program (VICP) is a federal program that was created to compensate people who may have been injured by certain vaccines. Persons who believe they may have been injured by a vaccine can learn about the program and about filing a claim by calling 2-898.978.5296 or visiting the [employer] website at www.[facility].gov/vaccinecompensation. There is a time limit to file a claim for compensation. 7. How can I learn more?  Ask your healthcare provider. He or she can give you the vaccine package insert or suggest other sources of information.  Call your local or state health department.  Contact the Centers for Disease Control and Prevention (CDC):  - Call 8-148.836.4291 (1-800-CDC-INFO) or  - Visit CDCs website at www.cdc.gov/flu    Vaccine Information Statement   Inactivated Influenza Vaccine   8/7/2015  [address] Shama Gilbert [id number]    Department of Health and Human Services  Centers for Disease Control and Prevention    Office Use Only

## 2018-11-13 NOTE — PROGRESS NOTES
HISTORY OF PRESENT ILLNESS  Kaycee Briones is a 80 y.o. female. Pt. comes in for f/u. Has multiple medical problems. Her BP and DM have been stable. Lives with her daughter and son-in-law. Has chronic arthritic pains. Tylenol helps. Gait is slow but no recent falls. Some memory impairment with old CVA. Has chronic iron deficiency anemia. Her depression and insomnia are stable on Remeron. Denies tobacco or alcohol use. Reports compliance with medications and diet. Med list and most recent labs/studies reviewed with pt. Hemoglobin is slightly less but other labs are stable. Trying to be active physically as tolerated. . Reports no other new c/o. HPI    Review of Systems   Constitutional: Negative. HENT: Negative. Eyes: Negative. Negative for blurred vision. Respiratory: Negative for shortness of breath. Cardiovascular: Negative for chest pain and leg swelling. Gastrointestinal: Negative for abdominal pain. Genitourinary: Negative for dysuria and frequency. Musculoskeletal: Positive for joint pain. Negative for falls. Skin: Negative. Neurological: Negative for dizziness, sensory change, focal weakness and headaches. Endo/Heme/Allergies: Negative for polydipsia. Psychiatric/Behavioral: Positive for depression and memory loss. Negative for suicidal ideas. The patient is nervous/anxious and has insomnia. All other systems reviewed and are negative. Physical Exam   Constitutional: She is oriented to person, place, and time. She appears well-developed and well-nourished. No distress. HENT:   Head: Normocephalic and atraumatic. Mouth/Throat: Oropharynx is clear and moist.   Eyes: Conjunctivae are normal.   Neck: Normal range of motion. Neck supple. No JVD present. No thyromegaly present. Cardiovascular: Normal rate, regular rhythm, normal heart sounds and intact distal pulses. No murmur heard.   Pulmonary/Chest: Effort normal and breath sounds normal. No respiratory distress. She has no wheezes. She has no rales. Abdominal: Soft. Bowel sounds are normal. She exhibits no distension. Musculoskeletal: She exhibits no edema or tenderness. djd   Neurological: She is alert and oriented to person, place, and time. Coordination normal.   A+O to name and [place]   Skin: Skin is warm and dry. No rash noted. Psychiatric: She has a normal mood and affect. Her behavior is normal.   Nursing note and vitals reviewed. ASSESSMENT and PLAN  Diagnoses and all orders for this visit:    1. Type 2 diabetes mellitus without complication, without long-term current use of insulin (HCC)  -     METABOLIC PANEL, BASIC; Future  -     CBC WITH AUTOMATED DIFF; Future  -     HEMOGLOBIN A1C WITH EAG; Future  -     LIPID PANEL; Future  -     ALT; Future  -     AST; Future    2. Encounter for immunization  -     INFLUENZA VACCINE INACTIVATED (IIV), SUBUNIT, ADJUVANTED, IM  -     ADMIN INFLUENZA VIRUS VAC    3. Essential hypertension    4. Chronic anemia    5. CVA, old, cognitive deficits    6. Depression, unspecified depression type      Follow-up Disposition:  Return in about 6 months (around 5/13/2019).    lab results and schedule of future lab studies reviewed with patient  reviewed diet, exercise and weight control  reviewed medications and side effects in detail  low cholesterol diet, weight control and daily exercise discussed, home glucose monitoring emphasized, all medications, side effects and compliance discussed carefully, foot care discussed and Podiatry visits discussed, annual eye examinations at Ophthalmology discussed, glycohemoglobin and other lab monitoring discussed and long term diabetic complications discussed  Overall stable

## 2018-12-03 ENCOUNTER — TELEPHONE (OUTPATIENT)
Dept: INTERNAL MEDICINE CLINIC | Age: 83
End: 2018-12-03

## 2018-12-03 DIAGNOSIS — H25.13 AGE-RELATED NUCLEAR CATARACT, BILATERAL: Primary | ICD-10-CM

## 2018-12-03 NOTE — TELEPHONE ENCOUNTER
Patient was seen by Dr Maricruz Waddell in the office today and needs a referral  Dx code: H25.13  Fax: [phone]

## 2019-04-23 RX ORDER — ATORVASTATIN CALCIUM 40 MG/1
TABLET, FILM COATED ORAL
Qty: 90 TAB | Refills: 2 | Status: SHIPPED | OUTPATIENT
Start: 2019-04-23 | End: 2019-07-29 | Stop reason: SDUPTHER

## 2019-05-12 DIAGNOSIS — E11.9 TYPE 2 DIABETES MELLITUS WITHOUT COMPLICATION, WITHOUT LONG-TERM CURRENT USE OF INSULIN (HCC): ICD-10-CM

## 2019-05-13 DIAGNOSIS — E11.9 TYPE 2 DIABETES MELLITUS WITHOUT COMPLICATION, WITHOUT LONG-TERM CURRENT USE OF INSULIN (HCC): ICD-10-CM

## 2019-05-23 RX ORDER — MULTIVITAMIN WITH MINERALS
ELIXIR ORAL
Qty: 473 ML | Refills: 5 | Status: SHIPPED | OUTPATIENT
Start: 2019-05-23 | End: 2019-07-29 | Stop reason: SDUPTHER

## 2019-05-23 RX ORDER — MIRTAZAPINE 7.5 MG/1
TABLET, FILM COATED ORAL
Qty: 90 TAB | Refills: 2 | Status: SHIPPED | OUTPATIENT
Start: 2019-05-23 | End: 2019-07-29 | Stop reason: SDUPTHER

## 2019-05-25 LAB
ALT SERPL-CCNC: 6 IU/L (ref 0–32)
AST SERPL-CCNC: 8 IU/L (ref 0–40)
BASOPHILS # BLD AUTO: 0 X10E3/UL (ref 0–0.2)
BASOPHILS NFR BLD AUTO: 0 %
BUN SERPL-MCNC: 18 MG/DL (ref 8–27)
BUN/CREAT SERPL: 17 (ref 12–28)
CALCIUM SERPL-MCNC: 9.7 MG/DL (ref 8.7–10.3)
CHLORIDE SERPL-SCNC: 108 MMOL/L (ref 96–106)
CHOLEST SERPL-MCNC: 111 MG/DL (ref 100–199)
CO2 SERPL-SCNC: 22 MMOL/L (ref 20–29)
CREAT SERPL-MCNC: 1.09 MG/DL (ref 0.57–1)
EOSINOPHIL # BLD AUTO: 0.1 X10E3/UL (ref 0–0.4)
EOSINOPHIL NFR BLD AUTO: 2 %
ERYTHROCYTE [DISTWIDTH] IN BLOOD BY AUTOMATED COUNT: 14.7 % (ref 12.3–15.4)
EST. AVERAGE GLUCOSE BLD GHB EST-MCNC: 151 MG/DL
GLUCOSE SERPL-MCNC: 127 MG/DL (ref 65–99)
HBA1C MFR BLD: 6.9 % (ref 4.8–5.6)
HCT VFR BLD AUTO: 30.3 % (ref 34–46.6)
HDLC SERPL-MCNC: 46 MG/DL
HGB BLD-MCNC: 9.8 G/DL (ref 11.1–15.9)
IMM GRANULOCYTES # BLD AUTO: 0 X10E3/UL (ref 0–0.1)
IMM GRANULOCYTES NFR BLD AUTO: 0 %
INTERPRETATION, 910389: NORMAL
INTERPRETATION: NORMAL
LDLC SERPL CALC-MCNC: 51 MG/DL (ref 0–99)
LYMPHOCYTES # BLD AUTO: 0.9 X10E3/UL (ref 0.7–3.1)
LYMPHOCYTES NFR BLD AUTO: 19 %
Lab: NORMAL
MCH RBC QN AUTO: 28.7 PG (ref 26.6–33)
MCHC RBC AUTO-ENTMCNC: 32.3 G/DL (ref 31.5–35.7)
MCV RBC AUTO: 89 FL (ref 79–97)
MONOCYTES # BLD AUTO: 0.3 X10E3/UL (ref 0.1–0.9)
MONOCYTES NFR BLD AUTO: 6 %
NEUTROPHILS # BLD AUTO: 3.5 X10E3/UL (ref 1.4–7)
NEUTROPHILS NFR BLD AUTO: 73 %
PDF IMAGE, 910387: NORMAL
PLATELET # BLD AUTO: 265 X10E3/UL (ref 150–450)
POTASSIUM SERPL-SCNC: 3.8 MMOL/L (ref 3.5–5.2)
RBC # BLD AUTO: 3.42 X10E6/UL (ref 3.77–5.28)
SODIUM SERPL-SCNC: 144 MMOL/L (ref 134–144)
TRIGL SERPL-MCNC: 70 MG/DL (ref 0–149)
VLDLC SERPL CALC-MCNC: 14 MG/DL (ref 5–40)
WBC # BLD AUTO: 4.8 X10E3/UL (ref 3.4–10.8)

## 2019-05-31 ENCOUNTER — OFFICE VISIT (OUTPATIENT)
Dept: INTERNAL MEDICINE CLINIC | Age: 84
End: 2019-05-31

## 2019-05-31 VITALS
HEART RATE: 68 BPM | OXYGEN SATURATION: 95 % | HEIGHT: 62 IN | BODY MASS INDEX: 18.44 KG/M2 | WEIGHT: 100.2 LBS | DIASTOLIC BLOOD PRESSURE: 80 MMHG | SYSTOLIC BLOOD PRESSURE: 138 MMHG | RESPIRATION RATE: 18 BRPM | TEMPERATURE: 98.1 F

## 2019-05-31 DIAGNOSIS — R41.3 MEMORY IMPAIRMENT: ICD-10-CM

## 2019-05-31 DIAGNOSIS — E78.00 HYPERCHOLESTEROLEMIA: ICD-10-CM

## 2019-05-31 DIAGNOSIS — I10 ESSENTIAL HYPERTENSION: ICD-10-CM

## 2019-05-31 DIAGNOSIS — E11.9 TYPE 2 DIABETES MELLITUS WITHOUT COMPLICATION, WITHOUT LONG-TERM CURRENT USE OF INSULIN (HCC): Primary | ICD-10-CM

## 2019-05-31 DIAGNOSIS — D64.9 CHRONIC ANEMIA: ICD-10-CM

## 2019-05-31 DIAGNOSIS — I69.319 CVA, OLD, COGNITIVE DEFICITS: ICD-10-CM

## 2019-05-31 PROBLEM — F32.A MILD DEPRESSION: Status: RESOLVED | Noted: 2018-08-20 | Resolved: 2019-05-31

## 2019-05-31 RX ORDER — LANOLIN ALCOHOL/MO/W.PET/CERES
CREAM (GRAM) TOPICAL
Qty: 90 TAB | Refills: 2 | Status: SHIPPED | OUTPATIENT
Start: 2019-05-31 | End: 2019-07-29 | Stop reason: SDUPTHER

## 2019-05-31 NOTE — PROGRESS NOTES
HISTORY OF PRESENT ILLNESS  Brayan Chua is a 80 y.o. female. Pt. comes in with her daughter for f/u. Has multiple medical problems. BP and DM have been stable. History of CVA with cognitive deficit and memory issues. According to daughter memory is getting worse. Has chronic iron deficiency anemia. Energy is fair. Weight is stable. She is chronically underweight. No tobacco or alcohol use. Unsteady gait but no falls. Has some chronic arthritic pains. Reports compliance with medications and diet. Med list and most recent labs/studies reviewed with pt. All labs are stable. Trying to be active physically to control weight. Needs med refills. Reports no other new c/o. HPI    Review of Systems   Constitutional: Negative. HENT: Negative. Eyes: Negative. Negative for blurred vision. Respiratory: Negative for shortness of breath. Cardiovascular: Negative for chest pain and leg swelling. Gastrointestinal: Negative for abdominal pain. Genitourinary: Negative for dysuria and frequency. Musculoskeletal: Positive for joint pain. Negative for falls. Skin: Negative. Neurological: Negative for dizziness, sensory change, focal weakness and headaches. Endo/Heme/Allergies: Negative for polydipsia. Psychiatric/Behavioral: Positive for depression and memory loss. Negative for suicidal ideas. The patient is nervous/anxious and has insomnia. All other systems reviewed and are negative. Physical Exam   Constitutional: She appears well-developed and well-nourished. No distress. HENT:   Head: Normocephalic and atraumatic. Mouth/Throat: Oropharynx is clear and moist.   Eyes: Conjunctivae are normal.   Neck: Normal range of motion. Neck supple. No JVD present. No thyromegaly present. Cardiovascular: Normal rate, regular rhythm, normal heart sounds and intact distal pulses. No murmur heard. Pulmonary/Chest: Effort normal and breath sounds normal. No respiratory distress.  She has no wheezes. She has no rales. Abdominal: Soft. Bowel sounds are normal. She exhibits no distension. Musculoskeletal: She exhibits no edema or tenderness. djd   Neurological: She is alert. Coordination normal.   A+O to name and Romain  Does not know the date or president's name   Skin: Skin is warm and dry. No rash noted. Psychiatric: She has a normal mood and affect. Her behavior is normal.   Nursing note and vitals reviewed. ASSESSMENT and PLAN  Diagnoses and all orders for this visit:    1. Type 2 diabetes mellitus without complication, without long-term current use of insulin ([facility] [address]    2. Essential hypertension    3. Chronic anemia    4. CVA, old, cognitive deficits    5. Memory impairment    6. Hypercholesterolemia    Other orders  -     ferrous sulfate 325 mg (65 mg iron) tablet; TAKE ONE TABLET BY MOUTH EVERY MORNING BEFORE BREAKFAST      Follow-up and Dispositions    · Return in about 6 months (around 11/30/2019).      lab results and schedule of future lab studies reviewed with patient  reviewed diet, exercise and weight control  reviewed medications and side effects in detail  low cholesterol diet, weight control and daily exercise discussed, home glucose monitoring emphasized, all medications, side effects and compliance discussed carefully, foot care discussed and Podiatry visits discussed, annual eye examinations at Ophthalmology discussed, glycohemoglobin and other lab monitoring discussed and long term diabetic complications discussed  F/u with other MD's as scheduled  Fall precautions discussed  Patient and daughter are not interested in medications for memory  Overall stable

## 2019-07-25 RX ORDER — AMLODIPINE BESYLATE 10 MG/1
TABLET ORAL
Qty: 90 TAB | Refills: 1 | Status: SHIPPED | OUTPATIENT
Start: 2019-07-25 | End: 2019-07-29 | Stop reason: SDUPTHER

## 2019-07-29 RX ORDER — OMEPRAZOLE 20 MG/1
20 CAPSULE, DELAYED RELEASE ORAL DAILY
Qty: 30 CAP | Refills: 3 | Status: SHIPPED | OUTPATIENT
Start: 2019-07-29 | End: 2019-12-05 | Stop reason: SDUPTHER

## 2019-07-29 RX ORDER — MIRTAZAPINE 7.5 MG/1
TABLET, FILM COATED ORAL
Qty: 90 TAB | Refills: 2 | Status: SHIPPED | OUTPATIENT
Start: 2019-07-29

## 2019-07-29 RX ORDER — AMLODIPINE BESYLATE 10 MG/1
TABLET ORAL
Qty: 90 TAB | Refills: 1 | Status: SHIPPED | OUTPATIENT
Start: 2019-07-29 | End: 2020-02-05 | Stop reason: SDUPTHER

## 2019-07-29 RX ORDER — LANOLIN ALCOHOL/MO/W.PET/CERES
CREAM (GRAM) TOPICAL
Qty: 90 TAB | Refills: 2 | Status: SHIPPED | OUTPATIENT
Start: 2019-07-29 | End: 2020-04-27

## 2019-07-29 RX ORDER — ATORVASTATIN CALCIUM 40 MG/1
TABLET, FILM COATED ORAL
Qty: 90 TAB | Refills: 2 | Status: SHIPPED | OUTPATIENT
Start: 2019-07-29 | End: 2020-03-12 | Stop reason: SDUPTHER

## 2019-07-29 RX ORDER — LOSARTAN POTASSIUM AND HYDROCHLOROTHIAZIDE 25; 100 MG/1; MG/1
TABLET ORAL
Qty: 90 TAB | Refills: 2 | Status: SHIPPED | OUTPATIENT
Start: 2019-07-29 | End: 2019-12-02 | Stop reason: SDUPTHER

## 2019-07-29 RX ORDER — ASPIRIN 81 MG/1
81 TABLET ORAL DAILY
Qty: 90 TAB | Refills: 2 | Status: SHIPPED | OUTPATIENT
Start: 2019-07-29 | End: 2020-05-05

## 2019-07-29 RX ORDER — CLONIDINE HYDROCHLORIDE 0.1 MG/1
TABLET ORAL
Qty: 90 TAB | Refills: 1 | Status: SHIPPED | OUTPATIENT
Start: 2019-07-29 | End: 2019-12-02 | Stop reason: SDUPTHER

## 2019-07-29 RX ORDER — POTASSIUM CHLORIDE 20 MEQ/1
TABLET, EXTENDED RELEASE ORAL
Qty: 90 TAB | Refills: 2 | Status: SHIPPED | OUTPATIENT
Start: 2019-07-29 | End: 2020-02-18

## 2019-07-29 RX ORDER — METFORMIN HYDROCHLORIDE 500 MG/1
TABLET ORAL
Qty: 180 TAB | Refills: 2 | Status: SHIPPED | OUTPATIENT
Start: 2019-07-29 | End: 2019-12-02 | Stop reason: SDUPTHER

## 2019-07-29 NOTE — TELEPHONE ENCOUNTER
Raymundo Dixon states her mothers meds have been misplaced  She is requesting a refill on all meds [facility] to [facility]  Mirtazapine 7.5mg insurance will only allow 14 day supply at a time for this refill. After pt would need authorization for usual supply.      Pt will continue using mail order pharmacy after this refill

## 2019-09-03 ENCOUNTER — OFFICE VISIT (OUTPATIENT)
Dept: INTERNAL MEDICINE CLINIC | Age: 84
End: 2019-09-03

## 2019-09-03 VITALS
TEMPERATURE: 98.5 F | SYSTOLIC BLOOD PRESSURE: 130 MMHG | BODY MASS INDEX: 18.44 KG/M2 | DIASTOLIC BLOOD PRESSURE: 88 MMHG | WEIGHT: 100.2 LBS | OXYGEN SATURATION: 98 % | HEIGHT: 62 IN | RESPIRATION RATE: 18 BRPM | HEART RATE: 79 BPM

## 2019-09-03 DIAGNOSIS — E11.9 TYPE 2 DIABETES MELLITUS WITHOUT COMPLICATION, WITHOUT LONG-TERM CURRENT USE OF INSULIN (HCC): ICD-10-CM

## 2019-09-03 DIAGNOSIS — R13.12 OROPHARYNGEAL DYSPHAGIA: Primary | ICD-10-CM

## 2019-09-03 DIAGNOSIS — I69.319 CVA, OLD, COGNITIVE DEFICITS: ICD-10-CM

## 2019-09-03 DIAGNOSIS — Z23 ENCOUNTER FOR IMMUNIZATION: ICD-10-CM

## 2019-09-03 DIAGNOSIS — F03.90 DEMENTIA WITHOUT BEHAVIORAL DISTURBANCE, UNSPECIFIED DEMENTIA TYPE: ICD-10-CM

## 2019-09-03 DIAGNOSIS — I10 ESSENTIAL HYPERTENSION: ICD-10-CM

## 2019-09-03 PROBLEM — G30.1 LATE ONSET ALZHEIMER'S DISEASE WITHOUT BEHAVIORAL DISTURBANCE (HCC): Status: ACTIVE | Noted: 2019-09-03

## 2019-09-03 PROBLEM — F02.80 LATE ONSET ALZHEIMER'S DISEASE WITHOUT BEHAVIORAL DISTURBANCE (HCC): Status: ACTIVE | Noted: 2019-09-03

## 2019-09-03 RX ORDER — DONEPEZIL HYDROCHLORIDE 5 MG/1
5 TABLET, FILM COATED ORAL
Qty: 30 TAB | Refills: 1 | Status: SHIPPED | OUTPATIENT
Start: 2019-09-03 | End: 2019-09-03 | Stop reason: SDUPTHER

## 2019-09-03 RX ORDER — DONEPEZIL HYDROCHLORIDE 5 MG/1
5 TABLET, FILM COATED ORAL
Qty: 90 TAB | Refills: 0 | Status: SHIPPED | OUTPATIENT
Start: 2019-09-03 | End: 2020-06-02 | Stop reason: SINTOL

## 2019-09-03 NOTE — PROGRESS NOTES
Brittney Ocampo is a 80 y.o. female  who presents for routine immunizations. He/She denies any symptoms , reactions or allergies that would exclude them from being immunized today. Risks and adverse reactions were discussed and the VIS was given to them. All questions were addressed. He/She was observed for 10 min post injection. There were no reactions observed. India Wooten, MARGOTHN

## 2019-09-03 NOTE — PATIENT INSTRUCTIONS
Vaccine Information Statement    Influenza (Flu) Vaccine (Inactivated or Recombinant): What You Need to Know    Many Vaccine Information Statements are available in Japanese and other languages. See www.immunize.org/vis  Hojas de información sobre vacunas están disponibles en español y en muchos otros idiomas. Visite www.immunize.org/vis    1. Why get vaccinated? Influenza vaccine can prevent influenza (flu). Flu is a contagious disease that spreads around the [facility] every year, usually between October and May. Anyone can get the flu, but it is more dangerous for some people. Infants and young children, people 72years of age and older, pregnant women, and people with certain health conditions or a weakened immune system are at greatest risk of flu complications. Pneumonia, bronchitis, sinus infections and ear infections are examples of flu-related complications. If you have a medical condition, such as heart disease, cancer or diabetes, flu can make it worse. Flu can cause fever and chills, sore throat, muscle aches, fatigue, cough, headache, and runny or stuffy nose. Some people may have vomiting and diarrhea, though this is more common in children than adults. Each year thousands of people in the [facility] die from flu, and many more are hospitalized. Flu vaccine prevents millions of illnesses and flu-related visits to the doctor each year. 2. Influenza vaccines     CDC recommends everyone 10months of age and older get vaccinated every flu season. Children 6 months through 6years of age may need 2 doses during a single flu season. Everyone else needs only 1 dose each flu season. It takes about 2 weeks for protection to develop after vaccination. There are many flu viruses, and they are always changing. Each year a new flu vaccine is made to protect against three or four viruses that are likely to cause disease in the upcoming flu season.  Even when the vaccine doesnt exactly match these viruses, it may still provide some protection. Influenza vaccine does not cause flu. Influenza vaccine may be given at the same time as other vaccines. 3. Talk with your health care provider    Tell your vaccine provider if the person getting the vaccine:   Has had an allergic reaction after a previous dose of influenza vaccine, or has any severe, life-threatening allergies.  Has ever had Guillain-Barré Syndrome (also called GBS). In some cases, your health care provider may decide to postpone influenza vaccination to a future visit. People with minor illnesses, such as a cold, may be vaccinated. People who are moderately or severely ill should usually wait until they recover before getting influenza vaccine. Your health care provider can give you more information. 4. Risks of a reaction     Soreness, redness, and swelling where shot is given, fever, muscle aches, and headache can happen after influenza vaccine.  There may be a very small increased risk of Guillain-Barré Syndrome (GBS) after inactivated influenza vaccine (the flu shot). [employer] children who get the flu shot along with pneumococcal vaccine (PCV13), and/or DTaP vaccine at the same time might be slightly more likely to have a seizure caused by fever. Tell your health care provider if a child who is getting flu vaccine has ever had a seizure. People sometimes faint after medical procedures, including vaccination. Tell your provider if you feel dizzy or have vision changes or ringing in the ears. As with any medicine, there is a very remote chance of a vaccine causing a severe allergic reaction, other serious injury, or death. 5. What if there is a serious problem? An allergic reaction could occur after the vaccinated person leaves the clinic.  If you see signs of a severe allergic reaction (hives, swelling of the face and throat, difficulty breathing, a fast heartbeat, dizziness, or weakness), call 9-1-1 and get the person to the nearest hospital.    For other signs that concern you, call your health care provider. Adverse reactions should be reported to the Vaccine Adverse Event Reporting System (VAERS). Your health care provider will usually file this report, or you can do it yourself. Visit the VAERS website at www.vaers. [facility].gov or call 6-710.920.2565. VAERS is only for reporting reactions, and VAERS staff do not give medical advice. 6. The National Vaccine Injury Compensation Program    The [facility] Vaccine Injury Compensation Program (VICP) is a federal program that was created to compensate people who may have been injured by certain vaccines. Visit the VICP website at www.[facility].gov/vaccinecompensation or call 6-360.702.6552 to learn about the program and about filing a claim. There is a time limit to file a claim for compensation. 7. How can I learn more?  Ask your health care provider.  Call your local or state health department.  Contact the Centers for Disease Control and Prevention (CDC):  - Call 4-963.769.9153 (1-800-CDC-INFO) or  - Visit CDCs influenza website at www.cdc.gov/flu    Vaccine Information Statement (Interim)  Inactivated Influenza Vaccine   8/15/2019  [address] 228WD-06   Department of Health and Human Services  Centers for Disease Control and Prevention    Office Use Only

## 2019-09-03 NOTE — TELEPHONE ENCOUNTER
Called pharmacy and was informed that pt picked up the 30 day supply that was sent in initially. Requested to call in a 90 day supply to have on file and was sent to . Verbal order given by Dr. Micah Gooden to send in 90 day Rx via e-prescribe. Called [facility] after verifying permission to release form and informed of new Rx for 90 days sent to Pharmacy. She verbalized understanding with no further questions at this time.

## 2019-09-03 NOTE — PROGRESS NOTES
Brittney Ocampo is a 80 y.o. female    Chief Complaint   Patient presents with    Diabetes    Depression    Hypertension    Cholesterol Problem     1. Have you been to the ER, urgent care clinic since your last visit? Hospitalized since your last visit? No     2. Have you seen or consulted any other health care providers outside of the [facility] since your last visit? Include any pap smears or colon screening.   No        Visit Vitals  /88 (BP 1 Location: Right arm, BP Patient Position: Sitting)   Pulse 79   Temp 98.5 °F (36.9 °C) (Oral)   Resp 18   Ht 5' 2\" (1.575 m)   Wt 100 lb 3.2 oz (45.5 kg)   SpO2 98%   BMI 18.33 kg/m²

## 2019-09-04 LAB
ALBUMIN SERPL-MCNC: 4.3 G/DL (ref 3.5–4.7)
ALBUMIN/GLOB SERPL: 1.3 {RATIO} (ref 1.2–2.2)
ALP SERPL-CCNC: 62 IU/L (ref 39–117)
ALT SERPL-CCNC: 5 IU/L (ref 0–32)
AST SERPL-CCNC: 9 IU/L (ref 0–40)
BILIRUB SERPL-MCNC: 0.4 MG/DL (ref 0–1.2)
BUN SERPL-MCNC: 19 MG/DL (ref 8–27)
BUN/CREAT SERPL: 17 (ref 12–28)
CALCIUM SERPL-MCNC: 10.1 MG/DL (ref 8.7–10.3)
CHLORIDE SERPL-SCNC: 108 MMOL/L (ref 96–106)
CO2 SERPL-SCNC: 22 MMOL/L (ref 20–29)
CREAT SERPL-MCNC: 1.11 MG/DL (ref 0.57–1)
ERYTHROCYTE [DISTWIDTH] IN BLOOD BY AUTOMATED COUNT: 13.1 % (ref 12.3–15.4)
FOLATE SERPL-MCNC: 5.9 NG/ML
GLOBULIN SER CALC-MCNC: 3.2 G/DL (ref 1.5–4.5)
GLUCOSE SERPL-MCNC: 132 MG/DL (ref 65–99)
HCT VFR BLD AUTO: 34.8 % (ref 34–46.6)
HGB BLD-MCNC: 11.3 G/DL (ref 11.1–15.9)
INTERPRETATION: NORMAL
Lab: NORMAL
MCH RBC QN AUTO: 29.2 PG (ref 26.6–33)
MCHC RBC AUTO-ENTMCNC: 32.5 G/DL (ref 31.5–35.7)
MCV RBC AUTO: 90 FL (ref 79–97)
PLATELET # BLD AUTO: 295 X10E3/UL (ref 150–450)
POTASSIUM SERPL-SCNC: 3.7 MMOL/L (ref 3.5–5.2)
PROT SERPL-MCNC: 7.5 G/DL (ref 6–8.5)
RBC # BLD AUTO: 3.87 X10E6/UL (ref 3.77–5.28)
SODIUM SERPL-SCNC: 147 MMOL/L (ref 134–144)
TSH SERPL DL<=0.005 MIU/L-ACNC: 0.45 UIU/ML (ref 0.45–4.5)
VIT B12 SERPL-MCNC: 701 PG/ML (ref 232–1245)
WBC # BLD AUTO: 6.3 X10E3/UL (ref 3.4–10.8)

## 2019-09-11 RX ORDER — CLONIDINE HYDROCHLORIDE 0.1 MG/1
TABLET ORAL
Qty: 90 TAB | Refills: 1 | Status: SHIPPED | OUTPATIENT
Start: 2019-09-11 | End: 2020-03-11

## 2019-09-17 ENCOUNTER — HOSPITAL ENCOUNTER (OUTPATIENT)
Dept: GENERAL RADIOLOGY | Age: 84
Discharge: HOME | End: 2019-09-17
Attending: INTERNAL MEDICINE
Payer: MEDICARE

## 2019-09-17 DIAGNOSIS — F03.90 DEMENTIA WITHOUT BEHAVIORAL DISTURBANCE, UNSPECIFIED DEMENTIA TYPE: ICD-10-CM

## 2019-09-17 DIAGNOSIS — R13.12 OROPHARYNGEAL DYSPHAGIA: ICD-10-CM

## 2019-09-17 PROCEDURE — 92611 MOTION FLUOROSCOPY/SWALLOW: CPT

## 2019-09-17 PROCEDURE — 74230 X-RAY XM SWLNG FUNCJ C+: CPT

## 2019-09-17 PROCEDURE — 92526 ORAL FUNCTION THERAPY: CPT

## 2019-09-17 NOTE — PROGRESS NOTES
SLP Modified Barium Swallow Study Education    Patient and patient's daughter seen following MBS for education. Educated patient and daughter on results of MBS and reviewed images showing oropharyngeal deficits. Explained that pt likely to be at chronic risk for aspiration given diagnosis of dementia, and that swallow will mirror mentation. Discussed importance of not force feeding patient and implementing general aspiration precautions. Educated on symptoms present such as increased chest infection, difficulty breathing, etc and instructed to contact PCP if any of these symptoms occur. Both expressed understanding. No further SLP needs anticipated at this time.       Thank you,  DERIK Chance.Ed, [id number] [facility]  Speech-Language Pathologist

## 2019-09-17 NOTE — PROGRESS NOTES
[address], [facility].    Speech Pathology Modified barium swallow Study  Patient: Bc Hein (8 y.o. female)  Date: 9/17/2019  Referring Provider:  Dr. Geneva Summers:   Pt accompanied by her daughter for study. Per daughter, pt with a history of dementia and therefore not a reliable historian. Pt's daughter states that pt appears to have some pocketing and, at times, expectorates her food. She states this typically happens at the end of a meal, and that pt states that she expectorates her food because she is worried that she will \"get choked\". No recent chest infection nor pneumonias. OBJECTIVE:   Past Medical History:   Past Medical History:   Diagnosis Date    Anemia     Depression     Diabetes ([facility] [address]     Hypercholesterolemia     Hypertension     Stroke ([facility] [address] 09/2012    CVA; left sided weakness   No past surgical history on file. Current Dietary Status:  Regular diet/thin liquids  Radiologist: Dr. Rainell Hodgkin Views: Lateral  Patient Position: Upright in Hausted chair    Trial 1: Trial 2:   Consistency Presented: Thin liquid Consistency Presented: Puree; Solid   How Presented: Self-fed/presented;Cup/sip;Straw;Successive swallows How Presented: Self-fed/presented;Spoon   Consistency Amount: (250 cc) Consistency Amount: (2 tsps Ba paste)   Bolus Acceptance: No impairment Bolus Acceptance: No impairment   Bolus Formation/Control: Impaired: Premature spillage Bolus Formation/Control: Impaired: Delayed(slightly delayed)   Propulsion: Discoordination Propulsion: Discoordination   Oral Residue: None Oral Residue: None   Initiation of Swallow: Triggered at pyriform sinus(es) Initiation of Swallow: Triggered at valleculae   Timing: Pooling 1-5 sec;Pyriform sinus Timing: No impairment   Penetration: Flash/transient;Trace Penetration: None   Aspiration/Timing: No evidence of aspiration Aspiration/Timing: No evidence of aspiration   Pharyngeal Clearance: No residue(No significant residue) Pharyngeal Clearance: No residue                               Decreased Tongue Base Retraction?: Yes  Laryngeal Elevation: Reduced excursion with laryngeal vestibule gap  Aspiration/Penetration Score: 2 (Penetration/No residue-Contrast enters the airway penetrates, remains above the folds/cords, and is cleared)  Pharyngeal Symmetry: Not assessed  Pharyngeal-Esophageal Segment: (?prominent CP bar)  Pharyngeal Dysfunction: Decreased tongue base retraction;Decreased strength;Decreased elevation/closure    Oral Phase Severity: Mild  Pharyngeal Phase Severity: Mild    ASSESSMENT :  Based on the objective data described above, the patient presents with mild oropharyngeal dysphagia. Oral dysphagia characterized by mildly discoordinated oral phase with premature spillage present with thin liquid consistency and slightly prolonged mastication with solid consistency. Pharyngeal dysphagia characterized by delayed swallow initiation and mildly decreased hyolaryngeal excursion (however this appropriate for patient's age) which results in flash penetration during the swallow with thin liquid consistency that ultimately clears airway with the force of the swallow. Pt with no significant pharyngeal residue. Suspect current swallow dysfunction is the combined result of pt's diagnosis of dementia with altered mentation, as well as normal age-related sarcopenia. However, despite these deficits, swallow largely functional and therefore feel pt safe to continue on diet as outlined below. Unfortunately, pt will be at chronic risk for prandial aspiration given diagnosis of dementia, and swallow likely to continue to mirror mentation. Pt does not require SLP intervention on an outpatient basis at this time. Please see separate note for MBS education.       PLAN/RECOMMENDATIONS :  --regular diet/thin liquids  --do not force feed  --could consider smaller, more frequent meals  --small sips/bites  --upright during meals  --No OP SLP indicated at this time. COMMUNICATION/EDUCATION:   The above findings and recommendations were discussed with: Physician who verbalized understanding.     Thank you for this referral.  AGUSTO Snider  Time Calculation: 20 mins

## 2019-09-24 PROBLEM — Z00.00 MEDICARE ANNUAL WELLNESS VISIT, SUBSEQUENT: Status: RESOLVED | Noted: 2018-08-20 | Resolved: 2019-09-24

## 2019-09-30 NOTE — PROGRESS NOTES
HISTORY OF PRESENT ILLNESS  Bony Horowitz is a 80 y.o. female. Pt. comes in with her daughter for f/u. Has multiple medical problems. Patient has some mild memory issues. According to daughter she has been pocketing her food. Patient denies having any difficulty swallowing. Denies any other new GI issues. DM other issues have been stable. No tobacco or alcohol use. Reports compliance with medications and diet. Med list and most recent labs/studies reviewed with pt. Trying to be active physically to control weight. Due for repeat labs. Reports no other new c/o. HPI    Review of Systems   Constitutional: Negative. HENT: Negative. Eyes: Negative. Negative for blurred vision. Respiratory: Negative for shortness of breath. Cardiovascular: Negative for chest pain and leg swelling. Gastrointestinal: Negative for abdominal pain and heartburn. Dysphagia, food pocketing   Genitourinary: Negative for dysuria and frequency. Musculoskeletal: Positive for joint pain. Negative for falls. Skin: Negative. Neurological: Negative for dizziness, sensory change, focal weakness and headaches. Endo/Heme/Allergies: Negative for polydipsia. Psychiatric/Behavioral: Positive for depression and memory loss. Negative for suicidal ideas. The patient is nervous/anxious and has insomnia. All other systems reviewed and are negative. Physical Exam   Constitutional: She appears well-developed and well-nourished. No distress. HENT:   Head: Normocephalic and atraumatic. Mouth/Throat: Oropharynx is clear and moist.   Eyes: Conjunctivae are normal. No scleral icterus. Neck: Normal range of motion. Neck supple. No JVD present. No thyromegaly present. Cardiovascular: Normal rate, regular rhythm, normal heart sounds and intact distal pulses. No murmur heard. Pulmonary/Chest: Effort normal and breath sounds normal. No respiratory distress. She has no wheezes. She has no rales. Abdominal: Soft. Bowel sounds are normal. She exhibits no distension and no mass. There is no tenderness. There is no rebound and no guarding. Musculoskeletal: She exhibits no edema or tenderness. djd   Neurological: She is alert. Coordination normal.   A+O to name and Romain  Does not know the date or president's name   Skin: Skin is warm and dry. No rash noted. Psychiatric: She has a normal mood and affect. Her behavior is normal.   Nursing note and vitals reviewed. ASSESSMENT and PLAN  Diagnoses and all orders for this visit:    1. Oropharyngeal dysphagia  -     XR SWALLOW FUNC VIDEO; Future    2. Type 2 diabetes mellitus without complication, without long-term current use of insulin (HCC)  -     METABOLIC PANEL, COMPREHENSIVE  -     CBC W/O DIFF    3. Essential hypertension    4. CVA, old, cognitive deficits    5. Dementia without behavioral disturbance, unspecified dementia type  -     METABOLIC PANEL, COMPREHENSIVE  -     CBC W/O DIFF  -     TSH 3RD GENERATION  -     VITAMIN B12  -     FOLATE  -     XR SWALLOW FUNC VIDEO; Future    6. Encounter for immunization  -     INFLUENZA VACCINE INACTIVATED (IIV), SUBUNIT, ADJUVANTED, IM  -     ADMIN INFLUENZA VIRUS VAC    Other orders  -     CKD REPORT  -     DIABETES PATIENT EDUCATION      Follow-up and Dispositions    · Return in about 4 weeks (around 10/1/2019).      lab results and schedule of future lab studies reviewed with patient  reviewed diet, exercise and weight control  reviewed medications and side effects in detail  low cholesterol diet, weight control and daily exercise discussed, home glucose monitoring emphasized, all medications, side effects and compliance discussed carefully, foot care discussed and Podiatry visits discussed, annual eye examinations at Ophthalmology discussed, glycohemoglobin and other lab monitoring discussed and long term diabetic complications discussed  F/u with other MD's as scheduled  Fall precautions discussed  Advised patient to chew her food properly and then swallow  Advised patient to increase fluid intake  May need referral to GI for EGD if no better

## 2019-10-23 RX ORDER — METFORMIN HYDROCHLORIDE 500 MG/1
TABLET ORAL
Qty: 180 TAB | Refills: 2 | Status: SHIPPED | OUTPATIENT
Start: 2019-10-23 | End: 2020-07-02

## 2019-10-30 RX ORDER — LOSARTAN POTASSIUM AND HYDROCHLOROTHIAZIDE 25; 100 MG/1; MG/1
TABLET ORAL
Qty: 90 TAB | Refills: 2 | Status: SHIPPED | OUTPATIENT
Start: 2019-10-30 | End: 2020-07-02

## 2019-11-27 DIAGNOSIS — E11.9 TYPE 2 DIABETES MELLITUS WITHOUT COMPLICATION, WITHOUT LONG-TERM CURRENT USE OF INSULIN (HCC): ICD-10-CM

## 2019-11-27 DIAGNOSIS — D64.9 CHRONIC ANEMIA: ICD-10-CM

## 2019-11-30 DIAGNOSIS — E11.9 TYPE 2 DIABETES MELLITUS WITHOUT COMPLICATION, WITHOUT LONG-TERM CURRENT USE OF INSULIN (HCC): ICD-10-CM

## 2019-12-02 ENCOUNTER — HOSPITAL ENCOUNTER (EMERGENCY)
Age: 84
Discharge: HOME | End: 2019-12-02
Attending: EMERGENCY MEDICINE | Admitting: EMERGENCY MEDICINE
Payer: MEDICARE

## 2019-12-02 VITALS
RESPIRATION RATE: 18 BRPM | WEIGHT: 96.56 LBS | BODY MASS INDEX: 17.77 KG/M2 | SYSTOLIC BLOOD PRESSURE: 117 MMHG | HEIGHT: 62 IN | OXYGEN SATURATION: 99 % | HEART RATE: 92 BPM | TEMPERATURE: 98 F | DIASTOLIC BLOOD PRESSURE: 66 MMHG

## 2019-12-02 DIAGNOSIS — E87.6 HYPOKALEMIA: ICD-10-CM

## 2019-12-02 DIAGNOSIS — R79.89 LOW TSH LEVEL: ICD-10-CM

## 2019-12-02 DIAGNOSIS — R53.83 FATIGUE, UNSPECIFIED TYPE: Primary | ICD-10-CM

## 2019-12-02 LAB
ALBUMIN SERPL-MCNC: 3 G/DL (ref 3.5–5)
ALBUMIN/GLOB SERPL: 0.6 {RATIO} (ref 1.1–2.2)
ALP SERPL-CCNC: 60 U/L (ref 45–117)
ALT SERPL-CCNC: 9 U/L (ref 12–78)
ANION GAP SERPL CALC-SCNC: 8 MMOL/L (ref 5–15)
APPEARANCE UR: ABNORMAL
AST SERPL-CCNC: 13 U/L (ref 15–37)
ATRIAL RATE: 83 BPM
BACTERIA URNS QL MICRO: NEGATIVE /HPF
BASOPHILS # BLD: 0 K/UL (ref 0–0.1)
BASOPHILS NFR BLD: 0 % (ref 0–1)
BILIRUB SERPL-MCNC: 0.5 MG/DL (ref 0.2–1)
BILIRUB UR QL: NEGATIVE
BUN SERPL-MCNC: 28 MG/DL (ref 6–20)
BUN/CREAT SERPL: 27 (ref 12–20)
CALCIUM SERPL-MCNC: 9.3 MG/DL (ref 8.5–10.1)
CALCULATED P AXIS, ECG09: 53 DEGREES
CALCULATED R AXIS, ECG10: -18 DEGREES
CALCULATED T AXIS, ECG11: 19 DEGREES
CHLORIDE SERPL-SCNC: 109 MMOL/L (ref 97–108)
CO2 SERPL-SCNC: 23 MMOL/L (ref 21–32)
COLOR UR: ABNORMAL
CREAT SERPL-MCNC: 1.02 MG/DL (ref 0.55–1.02)
DIAGNOSIS, 93000: NORMAL
DIFFERENTIAL METHOD BLD: ABNORMAL
EOSINOPHIL # BLD: 0 K/UL (ref 0–0.4)
EOSINOPHIL NFR BLD: 1 % (ref 0–7)
EPITH CASTS URNS QL MICRO: ABNORMAL /LPF
ERYTHROCYTE [DISTWIDTH] IN BLOOD BY AUTOMATED COUNT: 12.1 % (ref 11.5–14.5)
GLOBULIN SER CALC-MCNC: 4.8 G/DL (ref 2–4)
GLUCOSE SERPL-MCNC: 140 MG/DL (ref 65–100)
GLUCOSE UR STRIP.AUTO-MCNC: NEGATIVE MG/DL
HCT VFR BLD AUTO: 31.8 % (ref 35–47)
HGB BLD-MCNC: 9.9 G/DL (ref 11.5–16)
HGB UR QL STRIP: NEGATIVE
IMM GRANULOCYTES # BLD AUTO: 0.1 K/UL (ref 0–0.04)
IMM GRANULOCYTES NFR BLD AUTO: 1 % (ref 0–0.5)
KETONES UR QL STRIP.AUTO: NEGATIVE MG/DL
LEUKOCYTE ESTERASE UR QL STRIP.AUTO: ABNORMAL
LYMPHOCYTES # BLD: 1 K/UL (ref 0.8–3.5)
LYMPHOCYTES NFR BLD: 15 % (ref 12–49)
MAGNESIUM SERPL-MCNC: 1.7 MG/DL (ref 1.6–2.4)
MCH RBC QN AUTO: 28.4 PG (ref 26–34)
MCHC RBC AUTO-ENTMCNC: 31.1 G/DL (ref 30–36.5)
MCV RBC AUTO: 91.1 FL (ref 80–99)
MONOCYTES # BLD: 0.7 K/UL (ref 0–1)
MONOCYTES NFR BLD: 10 % (ref 5–13)
NEUTS SEG # BLD: 5.2 K/UL (ref 1.8–8)
NEUTS SEG NFR BLD: 73 % (ref 32–75)
NITRITE UR QL STRIP.AUTO: NEGATIVE
NRBC # BLD: 0 K/UL (ref 0–0.01)
NRBC BLD-RTO: 0 PER 100 WBC
P-R INTERVAL, ECG05: 122 MS
PH UR STRIP: 5.5 [PH] (ref 5–8)
PLATELET # BLD AUTO: 259 K/UL (ref 150–400)
PMV BLD AUTO: 10.3 FL (ref 8.9–12.9)
POTASSIUM SERPL-SCNC: 2.9 MMOL/L (ref 3.5–5.1)
PROT SERPL-MCNC: 7.8 G/DL (ref 6.4–8.2)
PROT UR STRIP-MCNC: 30 MG/DL
Q-T INTERVAL, ECG07: 358 MS
QRS DURATION, ECG06: 78 MS
QTC CALCULATION (BEZET), ECG08: 420 MS
RBC # BLD AUTO: 3.49 M/UL (ref 3.8–5.2)
RBC #/AREA URNS HPF: ABNORMAL /HPF (ref 0–5)
SODIUM SERPL-SCNC: 140 MMOL/L (ref 136–145)
SP GR UR REFRACTOMETRY: 1.02 (ref 1–1.03)
T4 FREE SERPL-MCNC: 1.7 NG/DL (ref 0.8–1.5)
TROPONIN I SERPL-MCNC: <0.05 NG/ML
TSH SERPL DL<=0.05 MIU/L-ACNC: 0.19 UIU/ML (ref 0.36–3.74)
UR CULT HOLD, URHOLD: NORMAL
UROBILINOGEN UR QL STRIP.AUTO: 1 EU/DL (ref 0.2–1)
VENTRICULAR RATE, ECG03: 83 BPM
WBC # BLD AUTO: 7 K/UL (ref 3.6–11)
WBC URNS QL MICRO: ABNORMAL /HPF (ref 0–4)

## 2019-12-02 PROCEDURE — 84443 ASSAY THYROID STIM HORMONE: CPT

## 2019-12-02 PROCEDURE — 85025 COMPLETE CBC W/AUTO DIFF WBC: CPT

## 2019-12-02 PROCEDURE — 83735 ASSAY OF MAGNESIUM: CPT

## 2019-12-02 PROCEDURE — 99284 EMERGENCY DEPT VISIT MOD MDM: CPT

## 2019-12-02 PROCEDURE — 84439 ASSAY OF FREE THYROXINE: CPT

## 2019-12-02 PROCEDURE — 93005 ELECTROCARDIOGRAM TRACING: CPT

## 2019-12-02 PROCEDURE — 36415 COLL VENOUS BLD VENIPUNCTURE: CPT

## 2019-12-02 PROCEDURE — 74011250637 HC RX REV CODE- 250/637: Performed by: EMERGENCY MEDICINE

## 2019-12-02 PROCEDURE — 81001 URINALYSIS AUTO W/SCOPE: CPT

## 2019-12-02 PROCEDURE — 84484 ASSAY OF TROPONIN QUANT: CPT

## 2019-12-02 PROCEDURE — 80053 COMPREHEN METABOLIC PANEL: CPT

## 2019-12-02 RX ORDER — POTASSIUM CHLORIDE 750 MG/1
40 TABLET, FILM COATED, EXTENDED RELEASE ORAL
Status: COMPLETED | OUTPATIENT
Start: 2019-12-02 | End: 2019-12-02

## 2019-12-02 RX ADMIN — POTASSIUM CHLORIDE 40 MEQ: 750 TABLET, FILM COATED, EXTENDED RELEASE ORAL at 07:11

## 2019-12-02 NOTE — DISCHARGE INSTRUCTIONS
- Your TSH was low. Free T4 and T3 are pending. Please follow-up with Dr. Chloé Bolaños this week for further evaluation.  - Your potassium was also low. Patient Education        Fatigue: Care Instructions  Your Care Instructions    Fatigue is a feeling of tiredness, exhaustion, or lack of energy. You may feel fatigue because of too much or not enough activity. It can also come from stress, lack of sleep, boredom, and poor diet. Many medical problems, such as viral infections, can cause fatigue. Emotional problems, especially depression, are often the cause of fatigue. Fatigue is most often a symptom of another problem. Treatment for fatigue depends on the cause. For example, if you have fatigue because you have a certain health problem, treating this problem also treats your fatigue. If depression or anxiety is the cause, treatment may help. Follow-up care is a key part of your treatment and safety. Be sure to make and go to all appointments, and call your doctor if you are having problems. It's also a good idea to know your test results and keep a list of the medicines you take. How can you care for yourself at home? · Get regular exercise. But don't overdo it. Go back and forth between rest and exercise. · Get plenty of rest.  · Eat a healthy diet. Do not skip meals, especially breakfast.  · Reduce your use of caffeine, tobacco, and alcohol. Caffeine is most often found in coffee, tea, cola drinks, and chocolate. · Limit medicines that can cause fatigue. This includes tranquilizers and cold and allergy medicines. When should you call for help? Watch closely for changes in your health, and be sure to contact your doctor if:    · You have new symptoms such as fever or a rash.     · Your fatigue gets worse.     · You have been feeling down, depressed, or hopeless. Or you may have lost interest in things that you usually enjoy.     · You are not getting better as expected. Where can you learn more?   Go to http://marylou-burke.info/. Enter Q253 in the search box to learn more about \"Fatigue: Care Instructions. \"  Current as of: June 26, 2019  Content Version: 12.2  © 2839-5983 [employer]. Care instructions adapted under license by [employer] (which disclaims liability or warranty for this information). If you have questions about a medical condition or this instruction, always ask your healthcare professional. [address] any warranty or liability for your use of this information. Patient Education        Hypokalemia: Care Instructions  Your Care Instructions    Hypokalemia (say \"pt-pp-udl-GEOVANY-theresa-uh\") is a low level of potassium. The heart, muscles, kidneys, and nervous system all need potassium to work well. This problem has many different causes. Kidney problems, diet, and medicines like diuretics and laxatives can cause it. So can vomiting or diarrhea. In some cases, cancer is the cause. Your doctor may do tests to find the cause of your low potassium levels. You may need medicines to bring your potassium levels back to normal. You may also need regular blood tests to check your potassium. If you have very low potassium, you may need intravenous (IV) medicines. You also may need tests to check the electrical activity of your heart. Heart problems caused by low potassium levels can be very serious. Follow-up care is a key part of your treatment and safety. Be sure to make and go to all appointments, and call your doctor if you are having problems. It's also a good idea to know your test results and keep a list of the medicines you take. How can you care for yourself at home? · If your doctor recommends it, eat foods that have a lot of potassium. These include fresh fruits, juices, and vegetables. They also include nuts, beans, and milk. · Be safe with medicines.  If your doctor prescribes medicines or potassium supplements, take them exactly as directed. Call your doctor if you have any problems with your medicines. · Get your potassium levels tested as often as your doctor tells you. When should you call for help? Call 911 anytime you think you may need emergency care. For example, call if:    · You feel like your heart is missing beats. Heart problems caused by low potassium can cause death.     · You passed out (lost consciousness).     · You have a seizure.    Call your doctor now or seek immediate medical care if:    · You feel weak or unusually tired.     · You have severe arm or leg cramps.     · You have tingling or numbness.     · You feel sick to your stomach, or you vomit.     · You have belly cramps.     · You feel bloated or constipated.     · You have to urinate a lot.     · You feel very thirsty most of the time.     · You are dizzy or lightheaded, or you feel like you may faint.     · You feel depressed, or you lose touch with reality.    Watch closely for changes in your health, and be sure to contact your doctor if:    · You do not get better as expected. Where can you learn more? Go to http://marylou-burke.info/. Enter G358 in the search box to learn more about \"Hypokalemia: Care Instructions. \"  Current as of: November 6, 2018  Content Version: 12.2  © 5279-7017 [employer]. Care instructions adapted under license by [employer] (which disclaims liability or warranty for this information). If you have questions about a medical condition or this instruction, always ask your healthcare professional. [place] [address] any warranty or liability for your use of this information.

## 2019-12-02 NOTE — ED PROVIDER NOTES
The patient is brought in by her daughter for increased fatigue and lethargy. She does have past medical history of dementia, so history from the patient cannot be obtained. The patient's daughter reports that she was sleeping a lot yesterday. She had poor po intake yesterday. This morning she went in to wake her up before 5 AM and had difficulty waking her up. She normally sleeps until approximately 7 AM.  The patient denies any medical complaints when questioned. The daughter reports that she has not had any fever. She has not had any vomiting or diarrhea. The daughter does note that she seems to be better since arriving in the emergency department. The history is provided by the patient. Lethargy          Past Medical History:   Diagnosis Date    Anemia     Depression     [facility]     Hypercholesterolemia     Hypertension     Stroke ([facility] 2012    CVA; left sided weakness       History reviewed. No pertinent surgical history.       Family History:   Problem Relation Age of Onset    No Known Problems Mother     No Known Problems Father        Social History     Socioeconomic History    Marital status:      Spouse name: Not on file    Number of children: Not on file    Years of education: Not on file    Highest education level: Not on file   Occupational History    Not on file   Social Needs    Financial resource strain: Not on file    Food insecurity:     Worry: Not on file     Inability: Not on file    Transportation needs:     Medical: Not on file     Non-medical: Not on file   Tobacco Use    Smoking status: Former Smoker     Packs/day: 0.25     Last attempt to quit: 2012     Years since quittin.9    Smokeless tobacco: Never Used   Substance and Sexual Activity    Alcohol use: No     Alcohol/week: 0.0 standard drinks    Drug use: No    Sexual activity: Not Currently     Comment: has one grown child   Lifestyle    Physical activity:     Days per week: Not on file     Minutes per session: Not on file    Stress: Not on file   Relationships    Social connections:     Talks on phone: Not on file     Gets together: Not on file     Attends Protestant service: Not on file     Active member of club or organization: Not on file     Attends meetings of clubs or organizations: Not on file     Relationship status: Not on file    Intimate partner violence:     Fear of current or ex partner: Not on file     Emotionally abused: Not on file     Physically abused: Not on file     Forced sexual activity: Not on file   Other Topics Concern    Not on file   Social History Narrative    Not on file         ALLERGIES: Patient has no known allergies. Review of Systems   Unable to perform ROS: Dementia   Constitutional: Negative for fever. Respiratory: Negative for cough. Gastrointestinal: Negative for diarrhea and vomiting. Genitourinary: Negative for dysuria. Musculoskeletal: Negative. Skin: Negative for rash. Vitals:    12/02/19 0526 12/02/19 0600 12/02/19 0715   BP: 117/51 117/51 126/55   Pulse: 98 95 92   Resp: 18  18   Temp: 98.4 °F (36.9 °C)  98.1 °F (36.7 °C)   SpO2: 98% 98% 99%   Weight: 43.8 kg (96 lb 9 oz)     Height: 5' 2\" (1.575 m)              Physical Exam  Vitals signs and nursing note reviewed. Constitutional:       Appearance: Normal appearance. She is well-developed. HENT:      Head: Normocephalic and atraumatic. Nose: Congestion present. Mouth/Throat:      Mouth: Mucous membranes are moist.   Eyes:      Extraocular Movements: Extraocular movements intact. Conjunctiva/sclera: Conjunctivae normal.      Pupils: Pupils are equal, round, and reactive to light. Neck:      Musculoskeletal: Normal range of motion and neck supple. Cardiovascular:      Rate and Rhythm: Normal rate and regular rhythm. Heart sounds: Murmur present. Pulmonary:      Effort: Pulmonary effort is normal.      Breath sounds: Normal breath sounds. Abdominal:      General: Bowel sounds are normal.      Palpations: Abdomen is soft. Musculoskeletal: Normal range of motion. Skin:     General: Skin is warm and dry. Neurological:      General: No focal deficit present. Mental Status: She is alert. Cranial Nerves: No cranial nerve deficit. Sensory: No sensory deficit. Motor: No weakness. Coordination: Coordination normal.      Comments: Oriented to person and place only. No focal weakness on exam. Walks with a shuffling gait. Psychiatric:         Mood and Affect: Mood normal.          MDM       Procedures    ED EKG interpretation:  Rhythm: normal sinus rhythm; and regular . Rate (approx.): 83; Axis: normal; P wave: prolonged - 1st degree AV block; QRS interval: normal ; ST/T wave: interpreted by Martha Grijalva MD, ED MD.    A/P:  1. Fatigue  2. Hypokalemia - on HCTZ and potassium supplements. KCl given in ED. F/u with Dr. Ehsan Pelaez this week. 3. Low TSH - free T4 and T3 pending. F/u with Dr. Ehsan Pelaez. 7:46 AM  Change of shift. Care of patient signed over to Dr. Mercedes Enriquez. Bedside handoff complete.  Awaiting labs and re-eval.

## 2019-12-02 NOTE — ED TRIAGE NOTES
Ambulatory from home accompanied by daughter who is her care giver with c/o sleeping all the time. Pt. Denies pain or any discomfort.

## 2019-12-05 ENCOUNTER — OFFICE VISIT (OUTPATIENT)
Dept: INTERNAL MEDICINE CLINIC | Age: 84
End: 2019-12-05

## 2019-12-05 VITALS
DIASTOLIC BLOOD PRESSURE: 78 MMHG | RESPIRATION RATE: 18 BRPM | HEART RATE: 87 BPM | TEMPERATURE: 97.3 F | BODY MASS INDEX: 17.66 KG/M2 | WEIGHT: 96 LBS | SYSTOLIC BLOOD PRESSURE: 130 MMHG | HEIGHT: 62 IN | OXYGEN SATURATION: 97 %

## 2019-12-05 DIAGNOSIS — R13.10 DYSPHAGIA, UNSPECIFIED TYPE: ICD-10-CM

## 2019-12-05 DIAGNOSIS — E11.9 TYPE 2 DIABETES MELLITUS WITHOUT COMPLICATION, WITHOUT LONG-TERM CURRENT USE OF INSULIN (HCC): ICD-10-CM

## 2019-12-05 DIAGNOSIS — E87.6 LOW POTASSIUM SYNDROME: ICD-10-CM

## 2019-12-05 DIAGNOSIS — Z00.00 MEDICARE ANNUAL WELLNESS VISIT, SUBSEQUENT: Primary | ICD-10-CM

## 2019-12-05 DIAGNOSIS — K21.9 GASTROESOPHAGEAL REFLUX DISEASE WITHOUT ESOPHAGITIS: ICD-10-CM

## 2019-12-05 DIAGNOSIS — F03.90 DEMENTIA WITHOUT BEHAVIORAL DISTURBANCE, UNSPECIFIED DEMENTIA TYPE: ICD-10-CM

## 2019-12-05 DIAGNOSIS — E44.1 MILD PROTEIN-CALORIE MALNUTRITION (HCC): ICD-10-CM

## 2019-12-05 DIAGNOSIS — Z09 FOLLOW-UP EXAM: ICD-10-CM

## 2019-12-05 RX ORDER — OMEPRAZOLE 20 MG/1
20 CAPSULE, DELAYED RELEASE ORAL DAILY
Qty: 30 CAP | Refills: 3 | Status: SHIPPED | OUTPATIENT
Start: 2019-12-05 | End: 2020-02-05 | Stop reason: SDUPTHER

## 2019-12-05 NOTE — PROGRESS NOTES
HISTORY OF PRESENT ILLNESS  Blaine Dent is a 80 y.o. female. That comes in for evaluation after an ER visit and her Annual Medicare Wellness visit. She is accompanied by her daughter. Patatient and daught state that they went to the ER after she looked pale and fatigued. SHe was fouind to have low potassium. She was given a dose and her alertness, and overall felling became better. She was to continue on her daily supplements. Daughter is also concerned with her appetite and pocketing foods. Barium Swallow was done in September and was unremarkable. Has tried ensure, but I gave her diarrhea. She states that she eats some and then gets tired over the same thing and spits it out. Denies chocking, of a feeling of it not going down. Denies teeth pain or jaw pain  Received the Flu shot   Denies CP, SOB, GI or  concerns. Is steadily around family and enjoys getting out  HPI    Review of Systems   Constitutional: Positive for weight loss. HENT: Negative. Hard of hearing    Respiratory: Negative. Cardiovascular: Negative. Gastrointestinal: Negative. Genitourinary: Negative. Musculoskeletal: Negative. Skin: Negative. Neurological: Negative. Psychiatric/Behavioral: Negative. Physical Exam  Vitals signs and nursing note reviewed. Constitutional:       Appearance: She is underweight. She is not ill-appearing. Comments: Frail woman    HENT:      Head: Normocephalic and atraumatic. Mouth/Throat:      Mouth: Mucous membranes are moist.   Eyes:      Pupils: Pupils are equal, round, and reactive to light. Neck:      Musculoskeletal: Neck supple. Cardiovascular:      Rate and Rhythm: Normal rate and regular rhythm. Pulses: Normal pulses. Heart sounds: Normal heart sounds. Pulmonary:      Effort: Pulmonary effort is normal.      Breath sounds: Normal breath sounds. Abdominal:      General: Bowel sounds are normal.      Palpations: Abdomen is soft. Musculoskeletal: Normal range of motion. Skin:     General: Skin is warm and dry. Neurological:      Mental Status: She is alert and oriented to person, place, and time. Comments: Foot Exam:    Left foot: microfilament negative, sensation present, pulses +2    Right foot: microfilament negative, sensation present, pulses +2    No ulcers presents   Psychiatric:         Behavior: Behavior is cooperative. ASSESSMENT and PLAN  Diagnoses and all orders for this visit:    1. Medicare annual wellness visit, subsequent  -     METABOLIC PANEL, COMPREHENSIVE  -     CBC WITH AUTOMATED DIFF    2. Follow-up exam    3. Low potassium syndrome    4. Dysphagia, unspecified type    5. Type 2 diabetes mellitus without complication, without long-term current use of insulin ([address]    6. Dementia without behavioral disturbance, unspecified dementia type ([address]    7. Mild protein-calorie malnutrition ([address]  -     food supplemt, lactose-reduced (ENSURE CLEAR) liqd; Take 237 mL by mouth two (2) times a day. 8. Gastroesophageal reflux disease without esophagitis  -     omeprazole (PRILOSEC) 20 mg capsule; Take 1 Cap by mouth daily. Indications: gastroesophageal reflux disease      Follow-up and Dispositions    · Return in about 3 months (around 3/5/2020), or if symptoms worsen or fail to improve.        current treatment plan is effective, no change in therapy  reviewed diet, exercise and weight control  reviewed medications and side effects in detail  specific diabetic recommendations: foot care discussed and Podiatry visits discussed and annual eye examinations at Ophthalmology discussed

## 2019-12-05 NOTE — PROGRESS NOTES
Health Maintenance Due   Topic Date Due    Shingrix Vaccine Age 49> (1 of 2) 11/14/1981    Bone Densitometry (Dexa) Screening  11/14/1996    GLAUCOMA SCREENING Q2Y  08/11/2018    EYE EXAM RETINAL OR DILATED  08/11/2018    MICROALBUMIN Q1  08/20/2019    MEDICARE YEARLY EXAM  08/21/2019    FOOT EXAM Q1  11/13/2019    HEMOGLOBIN A1C Q6M  11/24/2019       Chief Complaint   Patient presents with    ED Follow-up     [facility] weakness/lethargy 12/2/2019, low potassium    Annual Wellness Visit       1. Have you been to the ER, urgent care clinic since your last visit? Hospitalized since your last visit? Yes When: 12/2/2019 Where: [facility] Reason for visit: weakness/low potassium    2. Have you seen or consulted any other health care providers outside of the [address] [place] [address] since your last visit? Include any pap smears or colon screening. No    3) Do you have an Advance Directive on file? no    4) Are you interested in receiving information on Advance Directives? NO      Patient is accompanied by daughter I have received verbal consent from Juarez Velez to discuss any/all medical information while they are present in the room.    l

## 2019-12-05 NOTE — PROGRESS NOTES
THE SUBSEQUENT MEDICARE ANNUAL WELLNESS VISIT PROGRESS NOTES    This is a Subsequent Medicare Annual Wellness Visit providing Personalized Prevention Plan Services (PPPS) (Performed 12 months after initial AWV and PPPS )    I have reviewed the patient's medical history in detail and updated the computerized patient record. Blaine Dent is a 80 y.o.  female and presents for an subsequent annual wellness exam     Current Outpatient Medications   Medication Sig Dispense Refill    food supplemt, lactose-reduced (ENSURE CLEAR) liqd Take 237 mL by mouth two (2) times a day. 960 mL 2    omeprazole (PRILOSEC) 20 mg capsule Take 1 Cap by mouth daily. Indications: gastroesophageal reflux disease 30 Cap 3    losartan-hydroCHLOROthiazide (HYZAAR) 100-25 mg per tablet TAKE 1 TABLET DAILY 90 Tab 2    metFORMIN (GLUCOPHAGE) 500 mg tablet TAKE 1 TABLET TWICE A DAY  WITH MEALS 180 Tab 2    cloNIDine HCl (CATAPRES) 0.1 mg tablet TAKE 1 TABLET AT BEDTIME 90 Tab 1    amLODIPine (NORVASC) 10 mg tablet TAKE 1 TABLET DAILY 90 Tab 1    aspirin delayed-release 81 mg tablet Take 1 Tab by mouth daily. 90 Tab 2    atorvastatin (LIPITOR) 40 mg tablet TAKE 1 TABLET EVERY DAY 90 Tab 2    ferrous sulfate 325 mg (65 mg iron) tablet TAKE ONE TABLET BY MOUTH EVERY MORNING BEFORE BREAKFAST 90 Tab 2    mirtazapine (REMERON) 7.5 mg tablet TAKE 1 TABLET NIGHTLY 90 Tab 2    potassium chloride (K-DUR, KLOR-CON) 20 mEq tablet TAKE TWO TABLETS BY MOUTH EVERY DAY 90 Tab 2    geriatric multivitamins & minerals (ELDERTONIC) elix Take 15 mL by mouth Before breakfast, lunch, and dinner. 473 mL 5    acetaminophen 650 mg Tab Take 650 mg by mouth every six (6) hours as needed. 100 Tab 0    donepezil (ARICEPT) 5 mg tablet Take 1 Tab by mouth nightly.  80 Tab 0     Past Medical History:   Diagnosis Date    Anemia     Depression     [facility] [facility]     Hypercholesterolemia     Hypertension     Stroke ([facility] [facility] 09/2012    CVA; left sided weakness     No past surgical history on file. Family History   Problem Relation Age of Onset    No Known Problems Mother     No Known Problems Father          ROS   Negative except frail, underweight and hard of hearing     All other systems reviewed and are negative. History     Past Medical History:   Diagnosis Date    Anemia     Depression     Diabetes ([facility]     Hypercholesterolemia     Hypertension     Stroke ([facility] 09/2012    CVA; left sided weakness      No past surgical history on file. Current Outpatient Medications   Medication Sig Dispense Refill    food supplemt, lactose-reduced (ENSURE CLEAR) liqd Take 237 mL by mouth two (2) times a day. 960 mL 2    omeprazole (PRILOSEC) 20 mg capsule Take 1 Cap by mouth daily. Indications: gastroesophageal reflux disease 30 Cap 3    losartan-hydroCHLOROthiazide (HYZAAR) 100-25 mg per tablet TAKE 1 TABLET DAILY 90 Tab 2    metFORMIN (GLUCOPHAGE) 500 mg tablet TAKE 1 TABLET TWICE A DAY  WITH MEALS 180 Tab 2    cloNIDine HCl (CATAPRES) 0.1 mg tablet TAKE 1 TABLET AT BEDTIME 90 Tab 1    amLODIPine (NORVASC) 10 mg tablet TAKE 1 TABLET DAILY 90 Tab 1    aspirin delayed-release 81 mg tablet Take 1 Tab by mouth daily. 90 Tab 2    atorvastatin (LIPITOR) 40 mg tablet TAKE 1 TABLET EVERY DAY 90 Tab 2    ferrous sulfate 325 mg (65 mg iron) tablet TAKE ONE TABLET BY MOUTH EVERY MORNING BEFORE BREAKFAST 90 Tab 2    mirtazapine (REMERON) 7.5 mg tablet TAKE 1 TABLET NIGHTLY 90 Tab 2    potassium chloride (K-DUR, KLOR-CON) 20 mEq tablet TAKE TWO TABLETS BY MOUTH EVERY DAY 90 Tab 2    geriatric multivitamins & minerals (ELDERTONIC) elix Take 15 mL by mouth Before breakfast, lunch, and dinner. 473 mL 5    acetaminophen 650 mg Tab Take 650 mg by mouth every six (6) hours as needed. 100 Tab 0    donepezil (ARICEPT) 5 mg tablet Take 1 Tab by mouth nightly.  80 Tab 0     No Known Allergies  Family History   Problem Relation Age of Onset    No Known Problems Mother     No Known Problems Father      Social History     Tobacco Use    Smoking status: Former Smoker     Packs/day: 0.25     Last attempt to quit: 2012     Years since quittin.9    Smokeless tobacco: Never Used   Substance Use Topics    Alcohol use: No     Alcohol/week: 0.0 standard drinks     Patient Active Problem List   Diagnosis Code    Type 2 diabetes mellitus without complication ([facility] M91.1    Essential hypertension I10    Hypercholesterolemia E78.00    CVA, old, cognitive deficits I69.319    Insomnia G47.00    Cataracts, bilateral H26.9    Chronic diarrhea K52.9    ACP (advance care planning) Z71.89    Memory impairment R41.3    Chronic anemia D64.9    Depression F32.9    Late onset Alzheimer's disease without behavioral disturbance ([facility] G30.1, F02.80    Oropharyngeal dysphagia R13.12       Health Maintenance History  Immunizations reviewed, flu recieved  Eye exam: seen last year  Dexascan not completed     Health Care Directive or Living Will: no    Depression Risk Factor Screening:      Patient Health Questionnaire (PHQ-2)   Over the last 2 weeks, how often have you been bothered by any of the following problems? · Little interest or pleasure in doing things? · Not at all. [0]  · Feeling down, depressed, or hopeless? · Not at all. [0]    Total Score: 0/6  PHQ-2 Assessment Scoring:   A score of 2 or more requires further screening with the PHQ-9    Alcohol Risk Factor Screening:     Women: On any occasion during the past 3 months, have you had more than 3 drinks containing alcohol? Do you average more than 7 drinks per week? Men: On any occasion during the past 3 months, have you had more than 4 drinks containing alcohol? Do you average more than 14 drinks per week? Functional Ability and Level of Safety:     Hearing Loss    The patient needs further evaluation. Patient does want to visit an audiologist     Activities of Daily Living   Partial assistance.    Requires assistance with: dressing and no ADLs    Fall Risk   Gait weak (10 pts)    Abuse Screen   Patient is not abused  None      Examination   Physical Examination  Vitals:    12/05/19 1120   BP: 130/78   Pulse: 87   Resp: 18   Temp: 97.3 °F (36.3 °C)   TempSrc: Oral   SpO2: 97%   Weight: 96 lb (43.5 kg)   Height: 5' 2\" (1.575 m)   PainSc:   0 - No pain     Body mass index is 17.56 kg/m². Evaluation of Cognitive Function:  Mood/affect:pleasant   Appearance: groomed, frail  Family member/caregiver input:daughter present     alert, well appearing, and in no distress    Patient Care Team:  Darliss Meigs, DO as PCP - General (Internal Medicine)  Darliss Meigs, DO as PCP - [facility] Empaneled Provider    Advice/Referrals/Counseling/Plan:   Education and counseling provided:  Are appropriate based on today's review and evaluation  End-of-Life planning (with patient's consent)  Screening for glaucoma  Include in education list (weight loss, physical activity, smoking cessation, fall prevention, and nutrition)    I have discussed the diagnosis with the patient and the intended plan as seen in the above orders. The patient has received an after-visit summary and questions were answered concerning future plans. I have discussed medication side effects and warnings with the patient as well. I have reviewed the plan of care with the patient, accepted their input and they are in agreement with the treatment goals.

## 2019-12-10 LAB
ALBUMIN SERPL-MCNC: 3.7 G/DL (ref 3.5–4.7)
ALBUMIN/GLOB SERPL: 1 {RATIO} (ref 1.2–2.2)
ALP SERPL-CCNC: 70 IU/L (ref 39–117)
ALT SERPL-CCNC: 6 IU/L (ref 0–32)
AST SERPL-CCNC: 12 IU/L (ref 0–40)
BASOPHILS # BLD AUTO: 0 X10E3/UL (ref 0–0.2)
BASOPHILS NFR BLD AUTO: 0 %
BILIRUB SERPL-MCNC: 0.3 MG/DL (ref 0–1.2)
BUN SERPL-MCNC: 11 MG/DL (ref 8–27)
BUN/CREAT SERPL: 12 (ref 12–28)
CALCIUM SERPL-MCNC: 10.1 MG/DL (ref 8.7–10.3)
CHLORIDE SERPL-SCNC: 105 MMOL/L (ref 96–106)
CO2 SERPL-SCNC: 20 MMOL/L (ref 20–29)
CREAT SERPL-MCNC: 0.95 MG/DL (ref 0.57–1)
EOSINOPHIL # BLD AUTO: 0 X10E3/UL (ref 0–0.4)
EOSINOPHIL NFR BLD AUTO: 1 %
ERYTHROCYTE [DISTWIDTH] IN BLOOD BY AUTOMATED COUNT: 12 % (ref 12.3–15.4)
GLOBULIN SER CALC-MCNC: 3.7 G/DL (ref 1.5–4.5)
GLUCOSE SERPL-MCNC: 196 MG/DL (ref 65–99)
HCT VFR BLD AUTO: 33.9 % (ref 34–46.6)
HGB BLD-MCNC: 11 G/DL (ref 11.1–15.9)
IMM GRANULOCYTES # BLD AUTO: 0 X10E3/UL (ref 0–0.1)
IMM GRANULOCYTES NFR BLD AUTO: 0 %
INTERPRETATION: NORMAL
LYMPHOCYTES # BLD AUTO: 1.2 X10E3/UL (ref 0.7–3.1)
LYMPHOCYTES NFR BLD AUTO: 20 %
MCH RBC QN AUTO: 28.9 PG (ref 26.6–33)
MCHC RBC AUTO-ENTMCNC: 32.4 G/DL (ref 31.5–35.7)
MCV RBC AUTO: 89 FL (ref 79–97)
MONOCYTES # BLD AUTO: 0.3 X10E3/UL (ref 0.1–0.9)
MONOCYTES NFR BLD AUTO: 6 %
NEUTROPHILS # BLD AUTO: 4.5 X10E3/UL (ref 1.4–7)
NEUTROPHILS NFR BLD AUTO: 73 %
PLATELET # BLD AUTO: 433 X10E3/UL (ref 150–450)
POTASSIUM SERPL-SCNC: 5.1 MMOL/L (ref 3.5–5.2)
PROT SERPL-MCNC: 7.4 G/DL (ref 6–8.5)
RBC # BLD AUTO: 3.81 X10E6/UL (ref 3.77–5.28)
SODIUM SERPL-SCNC: 141 MMOL/L (ref 134–144)
WBC # BLD AUTO: 6.1 X10E3/UL (ref 3.4–10.8)

## 2019-12-10 NOTE — PROGRESS NOTES
Random glucose is elevated. Monitor sugar and carbohydrate intake   Hemoglobin is tad bit low.  Make sure the patient is taking in plenty of iron sources (fish and veggies)  Drink plenty of water during the day

## 2020-02-05 DIAGNOSIS — K21.9 GASTROESOPHAGEAL REFLUX DISEASE WITHOUT ESOPHAGITIS: ICD-10-CM

## 2020-02-06 RX ORDER — AMLODIPINE BESYLATE 10 MG/1
TABLET ORAL
Qty: 90 TAB | Refills: 1 | Status: ON HOLD | OUTPATIENT
Start: 2020-02-06 | End: 2020-07-02 | Stop reason: SDUPTHER

## 2020-02-06 RX ORDER — OMEPRAZOLE 20 MG/1
20 CAPSULE, DELAYED RELEASE ORAL DAILY
Qty: 30 CAP | Refills: 3 | Status: SHIPPED | OUTPATIENT
Start: 2020-02-06 | End: 2020-06-15

## 2020-03-11 RX ORDER — CLONIDINE HYDROCHLORIDE 0.1 MG/1
TABLET ORAL
Qty: 90 TAB | Refills: 1 | Status: SHIPPED | OUTPATIENT
Start: 2020-03-11

## 2020-03-12 RX ORDER — ATORVASTATIN CALCIUM 40 MG/1
TABLET, FILM COATED ORAL
Qty: 90 TAB | Refills: 1 | Status: SHIPPED | OUTPATIENT
Start: 2020-03-12 | End: 2020-03-20 | Stop reason: SDUPTHER

## 2020-03-12 NOTE — TELEPHONE ENCOUNTER
Requested Prescriptions     Pending Prescriptions Disp Refills    atorvastatin (LIPITOR) 40 mg tablet 90 Tab 2     Sig: TAKE 1 TABLET EVERY DAY     12/05/2019  No upcoming  [facility] on file

## 2020-03-20 RX ORDER — ATORVASTATIN CALCIUM 40 MG/1
TABLET, FILM COATED ORAL
Qty: 90 TAB | Refills: 1 | Status: SHIPPED | OUTPATIENT
Start: 2020-03-20

## 2020-04-06 ENCOUNTER — TELEPHONE (OUTPATIENT)
Dept: INTERNAL MEDICINE CLINIC | Age: 85
End: 2020-04-06

## 2020-04-06 DIAGNOSIS — F02.80 LATE ONSET ALZHEIMER'S DISEASE WITHOUT BEHAVIORAL DISTURBANCE (HCC): ICD-10-CM

## 2020-04-06 DIAGNOSIS — D64.9 CHRONIC ANEMIA: ICD-10-CM

## 2020-04-06 DIAGNOSIS — G30.1 LATE ONSET ALZHEIMER'S DISEASE WITHOUT BEHAVIORAL DISTURBANCE (HCC): ICD-10-CM

## 2020-04-06 DIAGNOSIS — E78.00 HYPERCHOLESTEROLEMIA: ICD-10-CM

## 2020-04-06 DIAGNOSIS — E11.9 TYPE 2 DIABETES MELLITUS WITHOUT COMPLICATION, WITHOUT LONG-TERM CURRENT USE OF INSULIN (HCC): ICD-10-CM

## 2020-04-06 DIAGNOSIS — I10 ESSENTIAL HYPERTENSION: Primary | ICD-10-CM

## 2020-04-06 NOTE — TELEPHONE ENCOUNTER
----- Message from Keehsa Garza sent at 4/3/2020  5:33 PM EDT -----  Regarding: Dr. Erin Alvarez / Telephone  Contact: [phone]  General Message/Vendor Calls    Caller's first and last name: Earnest Etienne  Reason for call: Pt needs referral paperwork for bloodwork to be completed before her appt scheduled on May 5th.   Callback required yes/no and why: Yes  Best contact number(s): [phone]  Details to clarify the request: N/A

## 2020-04-27 RX ORDER — LANOLIN ALCOHOL/MO/W.PET/CERES
CREAM (GRAM) TOPICAL
Qty: 90 TAB | Refills: 0 | Status: SHIPPED | OUTPATIENT
Start: 2020-04-27 | End: 2020-07-02

## 2020-05-05 ENCOUNTER — VIRTUAL VISIT (OUTPATIENT)
Dept: INTERNAL MEDICINE CLINIC | Age: 85
End: 2020-05-05

## 2020-05-05 DIAGNOSIS — K21.9 GASTROESOPHAGEAL REFLUX DISEASE WITHOUT ESOPHAGITIS: ICD-10-CM

## 2020-05-05 DIAGNOSIS — E44.1 MILD PROTEIN-CALORIE MALNUTRITION (HCC): ICD-10-CM

## 2020-05-05 DIAGNOSIS — D64.9 CHRONIC ANEMIA: ICD-10-CM

## 2020-05-05 DIAGNOSIS — I10 ESSENTIAL HYPERTENSION: ICD-10-CM

## 2020-05-05 DIAGNOSIS — G30.1 LATE ONSET ALZHEIMER'S DISEASE WITHOUT BEHAVIORAL DISTURBANCE (HCC): ICD-10-CM

## 2020-05-05 DIAGNOSIS — E78.00 HYPERCHOLESTEROLEMIA: ICD-10-CM

## 2020-05-05 DIAGNOSIS — F02.80 LATE ONSET ALZHEIMER'S DISEASE WITHOUT BEHAVIORAL DISTURBANCE (HCC): ICD-10-CM

## 2020-05-05 DIAGNOSIS — E11.9 TYPE 2 DIABETES MELLITUS WITHOUT COMPLICATION, WITHOUT LONG-TERM CURRENT USE OF INSULIN (HCC): Primary | ICD-10-CM

## 2020-05-05 NOTE — PROGRESS NOTES
Identified pt with two pt identifiers(name and ). Reviewed record in preparation for visit and have obtained necessary documentation. Chief Complaint   Patient presents with    Diabetes     (phone only: [phone])    Dementia     pt's mother states dementia has gotten worse since last visit on 19        Health Maintenance Due   Topic    Shingrix Vaccine Age 50> (1 of 2)    Bone Densitometry (Dexa) Screening     GLAUCOMA SCREENING Q2Y     Eye Exam Retinal or Dilated     MICROALBUMIN Q1        Coordination of Care Questionnaire:  :   1) Have you been to an emergency room, urgent care, or hospitalized since your last visit? If yes, where when, and reason for visit? no       2. Have seen or consulted any other health care provider since your last visit? If yes, where when, and reason for visit?   NO

## 2020-05-05 NOTE — PROGRESS NOTES
Ethel Zhang is a 80 y.o. female who was seen by synchronous (real-time) audio-video technology on 5/5/2020. Consent: Ethel Zhang, who was seen by synchronous (real-time) audio-video technology, and/or her healthcare decision maker, is aware that this patient-initiated, Telehealth encounter on 5/5/2020 is a billable service, with coverage as determined by her insurance carrier. She is aware that she may receive a bill and has provided verbal consent to proceed: Yes. Assessment & Plan:   Diagnoses and all orders for this visit:    1. Type 2 diabetes mellitus without complication, without long-term current use of insulin ([address]    2. Essential hypertension    3. Hypercholesterolemia    4. Late onset Alzheimer's disease without behavioral disturbance ([address]    5. Gastroesophageal reflux disease without esophagitis    6. Chronic anemia    7. Mild protein-calorie malnutrition ([facility]          I spent at least 23 minutes on this visit with this established patient. ([id number])    Subjective:   Ethel Zhang is a 80 y.o. female who was seen for Diabetes ((phone only: [phone])); Dementia (pt's mother states dementia has gotten worse since last visit on 12/5/19); Hypertension; and Follow Up Chronic Condition. Patient was seen with her daughter. Lives with her daughter. According to daughter patient's dementia/memory is getting worse. He is having more difficulty with eating and swallowing. She has been losing weight. Most recent modified barium swallow was normal.  DM and HTN have been relatively stable. Depends on family for many ADLs. According to daughter patient is not doing much. She is not interested in more medications. She is wondering how much longer she can take care of patient at home. She is asking about her options. They have been mostly at home because of COVID-19. Patient denies any cough, fever, dyspnea, chest pain. Med list and most recent studies reviewed.   Has not done labs as I had recommended previously. No other new complaints. Continue current medications  Do labs as recommended  Monitor BS at home with goal of 100-150  Monitor BP at home with goal of 140/90 or less  COVID-19 precautions discussed with pt  I told her daughter that she may need to look into placing patient in a memory unit if situation gets any worse  Follow-up in 1 month or as needed      Prior to Admission medications    Medication Sig Start Date End Date Taking? Authorizing Provider   ferrous sulfate 325 mg (65 mg iron) tablet TAKE 1 TABLET BY MOUTH IN THE MORNING BEFORE BREAKFAST 4/27/20  Yes Marcello Tamayo DO   atorvastatin (LIPITOR) 40 mg tablet TAKE 1 TABLET EVERY DAY 3/20/20  Yes Marcello Tamayo DO   cloNIDine HCL (CATAPRES) 0.1 mg tablet TAKE 1 TABLET AT BEDTIME 3/11/20  Yes Marilee Feliz NP   potassium chloride (K-DUR, KLOR-CON) 20 mEq tablet TAKE 2 TABLETS BY MOUTH ONCE DAILY 2/18/20  Yes Marcello Tamayo DO   amLODIPine (NORVASC) 10 mg tablet TAKE 1 TABLET DAILY 2/6/20  Yes Marcello aTmayo DO   omeprazole (PRILOSEC) 20 mg capsule Take 1 Cap by mouth daily. Indications: gastroesophageal reflux disease 2/6/20  Yes Marcello Tamayo DO   food supplemt, lactose-reduced (ENSURE CLEAR) liqd Take 237 mL by mouth two (2) times a day. 12/5/19  Yes Jez Feliz NP   losartan-hydroCHLOROthiazide (HYZAAR) 100-25 mg per tablet TAKE 1 TABLET DAILY 10/30/19  Yes Jez Feliz NP   metFORMIN (GLUCOPHAGE) 500 mg tablet TAKE 1 TABLET TWICE A DAY  WITH MEALS 10/23/19  Yes Marilee Feliz NP   donepezil (ARICEPT) 5 mg tablet Take 1 Tab by mouth nightly. 9/3/19  Yes Patricia Tamayo DO   aspirin delayed-release 81 mg tablet Take 1 Tab by mouth daily. 7/29/19  Yes Patricia Tamayo DO   mirtazapine (REMERON) 7.5 mg tablet TAKE 1 TABLET NIGHTLY 7/29/19  Yes Mirshahi, Patricia Paganini, DO   geriatric multivitamins & minerals (ELDERTONIC) elix Take 15 mL by mouth Before breakfast, lunch, and dinner.  7/29/19  Yes Diana Etienne, Marcello, DO   acetaminophen 650 mg Tab Take 650 mg by mouth every six (6) hours as needed. 9/21/12  Yes Michael Allen MD     No Known Allergies    Patient Active Problem List    Diagnosis Date Noted    Late onset Alzheimer's disease without behavioral disturbance ([facility] 09/03/2019    Oropharyngeal dysphagia 09/03/2019    Depression 02/02/2018    Chronic anemia 12/01/2017    Chronic diarrhea 12/16/2016    ACP (advance care planning) 12/16/2016    Memory impairment 12/16/2016    Type 2 diabetes mellitus without complication ([facility] 91/21/4869    Essential hypertension 04/17/2015    Hypercholesterolemia 04/17/2015    CVA, old, cognitive deficits 04/17/2015    Insomnia 04/17/2015    Cataracts, bilateral 04/17/2015     Current Outpatient Medications   Medication Sig Dispense Refill    ferrous sulfate 325 mg (65 mg iron) tablet TAKE 1 TABLET BY MOUTH IN THE MORNING BEFORE BREAKFAST 90 Tab 0    atorvastatin (LIPITOR) 40 mg tablet TAKE 1 TABLET EVERY DAY 90 Tab 1    cloNIDine HCL (CATAPRES) 0.1 mg tablet TAKE 1 TABLET AT BEDTIME 90 Tab 1    potassium chloride (K-DUR, KLOR-CON) 20 mEq tablet TAKE 2 TABLETS BY MOUTH ONCE DAILY 90 Tab 1    amLODIPine (NORVASC) 10 mg tablet TAKE 1 TABLET DAILY 90 Tab 1    omeprazole (PRILOSEC) 20 mg capsule Take 1 Cap by mouth daily. Indications: gastroesophageal reflux disease 30 Cap 3    food supplemt, lactose-reduced (ENSURE CLEAR) liqd Take 237 mL by mouth two (2) times a day. 960 mL 2    losartan-hydroCHLOROthiazide (HYZAAR) 100-25 mg per tablet TAKE 1 TABLET DAILY 90 Tab 2    metFORMIN (GLUCOPHAGE) 500 mg tablet TAKE 1 TABLET TWICE A DAY  WITH MEALS 180 Tab 2    donepezil (ARICEPT) 5 mg tablet Take 1 Tab by mouth nightly. 90 Tab 0    aspirin delayed-release 81 mg tablet Take 1 Tab by mouth daily.  90 Tab 2    mirtazapine (REMERON) 7.5 mg tablet TAKE 1 TABLET NIGHTLY 90 Tab 2    geriatric multivitamins & minerals (ELDERTONIC) elix Take 15 mL by mouth Before breakfast, lunch, and dinner. 473 mL 5    acetaminophen 650 mg Tab Take 650 mg by mouth every six (6) hours as needed. 100 Tab 0     No Known Allergies  Past Medical History:   Diagnosis Date    Anemia     Depression     [facility]     Hypercholesterolemia     Hypertension     Stroke ([facility] 2012    CVA; left sided weakness     Social History     Tobacco Use    Smoking status: Former Smoker     Packs/day: 0.25     Last attempt to quit: 2012     Years since quittin.3    Smokeless tobacco: Never Used   Substance Use Topics    Alcohol use: No     Alcohol/week: 0.0 standard drinks       ROS    Objective:   Vital Signs: (As obtained by patient/caregiver at home)  There were no vitals taken for this visit.      [INSTRUCTIONS:  \"[x]\" Indicates a positive item  \"[]\" Indicates a negative item  -- DELETE ALL ITEMS NOT EXAMINED]    Constitutional: [x] Appears well-developed and well-nourished [x] No apparent distress      [] Abnormal -     Mental status: [x] Alert and awake  [x] Oriented to person/place/time [x] Able to follow commands    [] Abnormal -     Eyes:   EOM    [x]  Normal    [] Abnormal -   Sclera  [x]  Normal    [] Abnormal -          Discharge [x]  None visible   [] Abnormal -     HENT: [x] Normocephalic, atraumatic  [] Abnormal -   [x] Mouth/Throat: Mucous membranes are moist    External Ears [x] Normal  [] Abnormal -    Neck: [x] No visualized mass [] Abnormal -     Pulmonary/Chest: [x] Respiratory effort normal   [x] No visualized signs of difficulty breathing or respiratory distress        [] Abnormal -      Musculoskeletal:   [x] Normal gait with no signs of ataxia         [x] Normal range of motion of neck        [] Abnormal -     Neurological:        [x] No Facial Asymmetry (Cranial nerve 7 motor function) (limited exam due to video visit)          [x] No gaze palsy        [] Abnormal -          Skin:        [x] No significant exanthematous lesions or discoloration noted on facial skin         [] Abnormal -            Psychiatric:       [x] Normal Affect [] Abnormal -        [x] No Hallucinations    Other pertinent observable physical exam findings:-        We discussed the expected course, resolution and complications of the diagnosis(es) in detail. Medication risks, benefits, costs, interactions, and alternatives were discussed as indicated. I advised her to contact the office if her condition worsens, changes or fails to improve as anticipated. She expressed understanding with the diagnosis(es) and plan. Aliya Guerrero is a 80 y.o. female who was evaluated by a video visit encounter for concerns as above. Patient identification was verified prior to start of the visit. A caregiver was present when appropriate. Due to this being a TeleHealth encounter (During [facility] public [facility] emergency), evaluation of the following organ systems was limited: Vitals/Constitutional/EENT/Resp/CV/GI//MS/Neuro/Skin/Heme-Lymph-Imm. Pursuant to the emergency declaration under the [facility], 1135 waiver authority and the [employer] and [employer] Act, this Virtual  Visit was conducted, with patient's (and/or legal guardian's) consent, to reduce the patient's risk of exposure to COVID-19 and provide necessary medical care. Services were provided through a video synchronous discussion virtually to substitute for in-person clinic visit. Patient and provider were located at their individual homes.       Eladio Sorto DO

## 2020-05-07 LAB
ALBUMIN SERPL-MCNC: 3.7 G/DL (ref 3.6–4.6)
ALBUMIN/GLOB SERPL: 1.4 {RATIO} (ref 1.2–2.2)
ALP SERPL-CCNC: 57 IU/L (ref 39–117)
ALT SERPL-CCNC: 8 IU/L (ref 0–32)
AST SERPL-CCNC: 11 IU/L (ref 0–40)
BASOPHILS # BLD AUTO: 0 X10E3/UL (ref 0–0.2)
BASOPHILS NFR BLD AUTO: 0 %
BILIRUB SERPL-MCNC: 0.3 MG/DL (ref 0–1.2)
BUN SERPL-MCNC: 10 MG/DL (ref 8–27)
BUN/CREAT SERPL: 13 (ref 12–28)
CALCIUM SERPL-MCNC: 9.7 MG/DL (ref 8.7–10.3)
CHLORIDE SERPL-SCNC: 103 MMOL/L (ref 96–106)
CHOLEST SERPL-MCNC: 116 MG/DL (ref 100–199)
CO2 SERPL-SCNC: 19 MMOL/L (ref 20–29)
CREAT SERPL-MCNC: 0.79 MG/DL (ref 0.57–1)
EOSINOPHIL # BLD AUTO: 0.1 X10E3/UL (ref 0–0.4)
EOSINOPHIL NFR BLD AUTO: 1 %
ERYTHROCYTE [DISTWIDTH] IN BLOOD BY AUTOMATED COUNT: 12.3 % (ref 11.7–15.4)
EST. AVERAGE GLUCOSE BLD GHB EST-MCNC: 169 MG/DL
FOLATE SERPL-MCNC: 3.8 NG/ML
GLOBULIN SER CALC-MCNC: 2.7 G/DL (ref 1.5–4.5)
GLUCOSE SERPL-MCNC: 176 MG/DL (ref 65–99)
HBA1C MFR BLD: 7.5 % (ref 4.8–5.6)
HCT VFR BLD AUTO: 31.6 % (ref 34–46.6)
HDLC SERPL-MCNC: 43 MG/DL
HGB BLD-MCNC: 10.6 G/DL (ref 11.1–15.9)
IMM GRANULOCYTES # BLD AUTO: 0 X10E3/UL (ref 0–0.1)
IMM GRANULOCYTES NFR BLD AUTO: 1 %
INTERPRETATION, 910389: NORMAL
LDLC SERPL CALC-MCNC: 56 MG/DL (ref 0–99)
LYMPHOCYTES # BLD AUTO: 0.9 X10E3/UL (ref 0.7–3.1)
LYMPHOCYTES NFR BLD AUTO: 16 %
Lab: NORMAL
MCH RBC QN AUTO: 29.6 PG (ref 26.6–33)
MCHC RBC AUTO-ENTMCNC: 33.5 G/DL (ref 31.5–35.7)
MCV RBC AUTO: 88 FL (ref 79–97)
MONOCYTES # BLD AUTO: 0.4 X10E3/UL (ref 0.1–0.9)
MONOCYTES NFR BLD AUTO: 7 %
NEUTROPHILS # BLD AUTO: 4.2 X10E3/UL (ref 1.4–7)
NEUTROPHILS NFR BLD AUTO: 75 %
PLATELET # BLD AUTO: 281 X10E3/UL (ref 150–450)
POTASSIUM SERPL-SCNC: 4.8 MMOL/L (ref 3.5–5.2)
PROT SERPL-MCNC: 6.4 G/DL (ref 6–8.5)
RBC # BLD AUTO: 3.58 X10E6/UL (ref 3.77–5.28)
SODIUM SERPL-SCNC: 137 MMOL/L (ref 134–144)
TRIGL SERPL-MCNC: 84 MG/DL (ref 0–149)
TSH SERPL DL<=0.005 MIU/L-ACNC: 0.77 UIU/ML (ref 0.45–4.5)
VIT B12 SERPL-MCNC: 862 PG/ML (ref 232–1245)
VLDLC SERPL CALC-MCNC: 17 MG/DL (ref 5–40)
WBC # BLD AUTO: 5.7 X10E3/UL (ref 3.4–10.8)

## 2020-06-02 ENCOUNTER — VIRTUAL VISIT (OUTPATIENT)
Dept: INTERNAL MEDICINE CLINIC | Age: 85
End: 2020-06-02

## 2020-06-02 VITALS — BODY MASS INDEX: 17.56 KG/M2 | HEIGHT: 62 IN

## 2020-06-02 DIAGNOSIS — R63.4 WEIGHT LOSS: ICD-10-CM

## 2020-06-02 DIAGNOSIS — I10 ESSENTIAL HYPERTENSION: ICD-10-CM

## 2020-06-02 DIAGNOSIS — R13.12 OROPHARYNGEAL DYSPHAGIA: Primary | ICD-10-CM

## 2020-06-02 DIAGNOSIS — F02.80 LATE ONSET ALZHEIMER'S DISEASE WITHOUT BEHAVIORAL DISTURBANCE (HCC): ICD-10-CM

## 2020-06-02 DIAGNOSIS — E11.9 TYPE 2 DIABETES MELLITUS WITHOUT COMPLICATION, WITHOUT LONG-TERM CURRENT USE OF INSULIN (HCC): ICD-10-CM

## 2020-06-02 DIAGNOSIS — G30.1 LATE ONSET ALZHEIMER'S DISEASE WITHOUT BEHAVIORAL DISTURBANCE (HCC): ICD-10-CM

## 2020-06-02 NOTE — PROGRESS NOTES
Rosi Maier is a 80 y.o. female who was seen by synchronous (real-time) audio-video technology on 6/2/2020. Consent: Rosi Maier, who was seen by synchronous (real-time) audio-video technology, and/or her healthcare decision maker, is aware that this patient-initiated, Telehealth encounter on 6/2/2020 is a billable service, with coverage as determined by her insurance carrier. She is aware that she may receive a bill and has provided verbal consent to proceed: Yes. Assessment & Plan:   Diagnoses and all orders for this visit:    1. Oropharyngeal dysphagia  -     REFERRAL TO GASTROENTEROLOGY    2. Weight loss  -     REFERRAL TO GASTROENTEROLOGY    3. Late onset Alzheimer's disease without behavioral disturbance (HCC)  -     REFERRAL TO GASTROENTEROLOGY    4. Type 2 diabetes mellitus without complication, without long-term current use of insulin ([facility]    5. Essential hypertension        I spent at least 25 minutes on this visit with this established patient. Subjective:   Rosi Maier is a 80 y.o. female who was seen for Decreased Appetite (still has decreased appetite, can only tolerate broth and liquids, pocketing food) and Dementia (dementia worsening, weak)    Pt is seen with her daughter at home. She has a few chronic medical issues including worsening dementia. Continues to have issues with oropharyngeal dysphasia and food pocketing. Drinks fluids okay. Previous modified barium swallow was normal.  Recent extensive labs reviewed and they are all normal.  Previous head CT showed chronic vascular changes. Patient depends on family for most ADLs now. Medical issues including DM and HTN have been stable on current medications. Med list the most recent studies reviewed with daughter. She has been getting patient fluids and Ensure. She is concerned because patient is losing weight and looks weak. Asking if there is anything else we can do. Patient could not tolerate Aricept. Remains on Remeron at bedtime. Daughter wonders if a feeding tube will help. They have been home mostly because of COVID-19. Denies any related signs or symptoms. No other new complaints. Plan:  Explained to daughter that patient's swallowing issues are most likely related to her dementia and unfortunately there is not any effect treatments  Will refer patient to GI/Dr. Shea Gilford for an evaluation including consideration of feeding tube. I have explained to daughter that a feeding tube may not necessarily improve her quality of life and could potentially have other complications  Continue current medications  Encourage patient to take fluids and Ensure  Try feeding her what she enjoys with close monitoring  COVID-19 precautions discussed with pt  Follow-up as scheduled or as needed    Prior to Admission medications    Medication Sig Start Date End Date Taking? Authorizing Provider   potassium chloride (K-DUR, KLOR-CON) 20 mEq tablet TAKE 2  BY MOUTH ONCE DAILY 5/18/20  Yes Marcello Tamayo DO   aspirin delayed-release 81 mg tablet Take 1 tablet by mouth once daily 5/5/20  Yes Marcello Tamayo DO   ferrous sulfate 325 mg (65 mg iron) tablet TAKE 1 TABLET BY MOUTH IN THE MORNING BEFORE BREAKFAST 4/27/20  Yes Marcello Tamayo DO   atorvastatin (LIPITOR) 40 mg tablet TAKE 1 TABLET EVERY DAY 3/20/20  Yes Marcello Tamayo DO   cloNIDine HCL (CATAPRES) 0.1 mg tablet TAKE 1 TABLET AT BEDTIME 3/11/20  Yes Marilee Feliz NP   amLODIPine (NORVASC) 10 mg tablet TAKE 1 TABLET DAILY 2/6/20  Yes Marcello Tamayo DO   omeprazole (PRILOSEC) 20 mg capsule Take 1 Cap by mouth daily. Indications: gastroesophageal reflux disease 2/6/20  Yes Marcello Tamayo DO   food supplemt, lactose-reduced (ENSURE CLEAR) liqd Take 237 mL by mouth two (2) times a day.  12/5/19  Yes Lisandro Feliz NP   losartan-hydroCHLOROthiazide (HYZAAR) 100-25 mg per tablet TAKE 1 TABLET DAILY 10/30/19  Yes Lisandro Feliz NP   metFORMIN (GLUCOPHAGE) 500 mg tablet TAKE 1 TABLET TWICE A DAY  WITH MEALS 10/23/19  Yes Castro Feliz NP   mirtazapine (REMERON) 7.5 mg tablet TAKE 1 TABLET NIGHTLY 7/29/19  Yes [facility], [address], DO   geriatric multivitamins & minerals (ELDERTONIC) elix Take 15 mL by mouth Before breakfast, lunch, and dinner. 7/29/19  Yes [facility], [address], DO   acetaminophen 650 mg Tab Take 650 mg by mouth every six (6) hours as needed. 9/21/12  Yes Ambreen Pickett MD   donepezil (ARICEPT) 5 mg tablet Take 1 Tab by mouth nightly. 9/3/19 6/2/20  Ed Javier DO     No Known Allergies    Patient Active Problem List    Diagnosis Date Noted    Late onset Alzheimer's disease without behavioral disturbance ([facility] 09/03/2019    Oropharyngeal dysphagia 09/03/2019    Depression 02/02/2018    Chronic anemia 12/01/2017    Chronic diarrhea 12/16/2016    ACP (advance care planning) 12/16/2016    Memory impairment 12/16/2016    Type 2 diabetes mellitus without complication ([facility] 64/03/5556    Essential hypertension 04/17/2015    Hypercholesterolemia 04/17/2015    CVA, old, cognitive deficits 04/17/2015    Insomnia 04/17/2015    Cataracts, bilateral 04/17/2015     Current Outpatient Medications   Medication Sig Dispense Refill    potassium chloride (K-DUR, KLOR-CON) 20 mEq tablet TAKE 2  BY MOUTH ONCE DAILY 90 Tab 1    aspirin delayed-release 81 mg tablet Take 1 tablet by mouth once daily 90 Tab 3    ferrous sulfate 325 mg (65 mg iron) tablet TAKE 1 TABLET BY MOUTH IN THE MORNING BEFORE BREAKFAST 90 Tab 0    atorvastatin (LIPITOR) 40 mg tablet TAKE 1 TABLET EVERY DAY 90 Tab 1    cloNIDine HCL (CATAPRES) 0.1 mg tablet TAKE 1 TABLET AT BEDTIME 90 Tab 1    amLODIPine (NORVASC) 10 mg tablet TAKE 1 TABLET DAILY 90 Tab 1    omeprazole (PRILOSEC) 20 mg capsule Take 1 Cap by mouth daily. Indications: gastroesophageal reflux disease 30 Cap 3    food supplemt, lactose-reduced (ENSURE CLEAR) liqd Take 237 mL by mouth two (2) times a day.  960 mL 2    losartan-hydroCHLOROthiazide (HYZAAR) 100-25 mg per tablet TAKE 1 TABLET DAILY 90 Tab 2    metFORMIN (GLUCOPHAGE) 500 mg tablet TAKE 1 TABLET TWICE A DAY  WITH MEALS 180 Tab 2    mirtazapine (REMERON) 7.5 mg tablet TAKE 1 TABLET NIGHTLY 90 Tab 2    geriatric multivitamins & minerals (ELDERTONIC) elix Take 15 mL by mouth Before breakfast, lunch, and dinner. 473 mL 5    acetaminophen 650 mg Tab Take 650 mg by mouth every six (6) hours as needed. 100 Tab 0     No Known Allergies  Past Medical History:   Diagnosis Date    Anemia     Depression     [facility]     Hypercholesterolemia     Hypertension     Stroke ([facility] 2012    CVA; left sided weakness     History reviewed. No pertinent surgical history.   Social History     Tobacco Use    Smoking status: Former Smoker     Packs/day: 0.25     Last attempt to quit: 2012     Years since quittin.4    Smokeless tobacco: Never Used   Substance Use Topics    Alcohol use: No     Alcohol/week: 0.0 standard drinks       ROS    Objective:   Vital Signs: (As obtained by patient/caregiver at home)  Visit Vitals  Height 5' 2\" (1.575 m)   Body Mass Index 17.56 kg/m²        [INSTRUCTIONS:  \"[x]\" Indicates a positive item  \"[]\" Indicates a negative item  -- DELETE ALL ITEMS NOT EXAMINED]    Constitutional: [x] Appears well-developed and well-nourished [x] No apparent distress      [] Abnormal -     Mental status: [x] Alert and awake  [x] Oriented to person/place/time [x] Able to follow commands    [] Abnormal -     Eyes:   EOM    [x]  Normal    [] Abnormal -   Sclera  [x]  Normal    [] Abnormal -          Discharge [x]  None visible   [] Abnormal -     HENT: [x] Normocephalic, atraumatic  [] Abnormal -   [x] Mouth/Throat: Mucous membranes are moist    External Ears [x] Normal  [] Abnormal -    Neck: [x] No visualized mass [] Abnormal -     Pulmonary/Chest: [x] Respiratory effort normal   [x] No visualized signs of difficulty breathing or respiratory distress [] Abnormal -      Musculoskeletal:   [x] Normal gait with no signs of ataxia         [x] Normal range of motion of neck        [] Abnormal -     Neurological:        [x] No Facial Asymmetry (Cranial nerve 7 motor function) (limited exam due to video visit)          [x] No gaze palsy        [] Abnormal -          Skin:        [x] No significant exanthematous lesions or discoloration noted on facial skin         [] Abnormal -            Psychiatric:       [x] Normal Affect [] Abnormal -        [x] No Hallucinations    Other pertinent observable physical exam findings:-        We discussed the expected course, resolution and complications of the diagnosis(es) in detail. Medication risks, benefits, costs, interactions, and alternatives were discussed as indicated. I advised her to contact the office if her condition worsens, changes or fails to improve as anticipated. She expressed understanding with the diagnosis(es) and plan. Joshua Cuello is a 80 y.o. female who was evaluated by a video visit encounter for concerns as above. Patient identification was verified prior to start of the visit. A caregiver was present when appropriate. Due to this being a TeleHealth encounter (During [facility]-12 public health emergency), evaluation of the following organ systems was limited: Vitals/Constitutional/EENT/Resp/CV/GI//MS/Neuro/Skin/Heme-Lymph-Imm. Pursuant to the emergency declaration under the [facility], 1135 waiver authority and the [employer] and [employer] Act, this Virtual  Visit was conducted, with patient's (and/or legal guardian's) consent, to reduce the patient's risk of exposure to COVID-19 and provide necessary medical care. Services were provided through a video synchronous discussion virtually to substitute for in-person clinic visit. Patient and provider were located at their individual homes.       Laura Booth DO

## 2020-06-02 NOTE — PROGRESS NOTES
Health Maintenance Due   Topic Date Due    Shingrix Vaccine Age 49> (1 of 2) 11/14/1981    Bone Densitometry (Dexa) Screening  11/14/1996    GLAUCOMA SCREENING Q2Y  08/11/2018    Eye Exam Retinal or Dilated  08/11/2018    MICROALBUMIN Q1  08/20/2019       Chief Complaint   Patient presents with    Decreased Appetite     still has decreased appetite, can only tolerate broth and liquids, pocketing food    Dementia     dementia worsening, weak       1. Have you been to the ER, urgent care clinic since your last visit? Hospitalized since your last visit? No    2. Have you seen or consulted any other health care providers outside of the [address] [place] [address] since your last visit? Include any pap smears or colon screening. No    3) Do you have an Advance Directive on file? no    4) Are you interested in receiving information on Advance Directives? NO      Patient is accompanied by self I have received verbal consent from Domingo Weber to discuss any/all medical information while they are present in the room.

## 2020-06-15 DIAGNOSIS — K21.9 GASTROESOPHAGEAL REFLUX DISEASE WITHOUT ESOPHAGITIS: ICD-10-CM

## 2020-06-15 RX ORDER — OMEPRAZOLE 20 MG/1
CAPSULE, DELAYED RELEASE ORAL
Qty: 30 CAP | Refills: 3 | Status: SHIPPED | OUTPATIENT
Start: 2020-06-15

## 2020-06-22 ENCOUNTER — APPOINTMENT (OUTPATIENT)
Dept: GENERAL RADIOLOGY | Age: 85
DRG: 374 | End: 2020-06-22
Attending: EMERGENCY MEDICINE
Payer: MEDICARE

## 2020-06-22 ENCOUNTER — APPOINTMENT (OUTPATIENT)
Dept: CT IMAGING | Age: 85
DRG: 374 | End: 2020-06-22
Attending: HOSPITALIST
Payer: MEDICARE

## 2020-06-22 ENCOUNTER — HOSPITAL ENCOUNTER (INPATIENT)
Age: 85
LOS: 10 days | Discharge: HOSPICE | DRG: 374 | End: 2020-07-02
Attending: EMERGENCY MEDICINE | Admitting: HOSPITALIST
Payer: MEDICARE

## 2020-06-22 DIAGNOSIS — R53.81 DEBILITY: ICD-10-CM

## 2020-06-22 DIAGNOSIS — Z71.89 GOALS OF CARE, COUNSELING/DISCUSSION: ICD-10-CM

## 2020-06-22 DIAGNOSIS — C15.5 CANCER OF DISTAL THIRD OF ESOPHAGUS (HCC): Primary | ICD-10-CM

## 2020-06-22 DIAGNOSIS — Z71.89 DNR (DO NOT RESUSCITATE) DISCUSSION: ICD-10-CM

## 2020-06-22 DIAGNOSIS — R13.10 DYSPHAGIA, UNSPECIFIED TYPE: ICD-10-CM

## 2020-06-22 PROBLEM — R62.7 FTT (FAILURE TO THRIVE) IN ADULT: Status: ACTIVE | Noted: 2020-06-22

## 2020-06-22 PROBLEM — D49.0: Status: ACTIVE | Noted: 2020-06-22

## 2020-06-22 LAB
ALBUMIN SERPL-MCNC: 3 G/DL (ref 3.5–5)
ALBUMIN/GLOB SERPL: 0.7 {RATIO} (ref 1.1–2.2)
ALP SERPL-CCNC: 64 U/L (ref 45–117)
ALT SERPL-CCNC: 11 U/L (ref 12–78)
ANION GAP SERPL CALC-SCNC: 8 MMOL/L (ref 5–15)
AST SERPL-CCNC: 13 U/L (ref 15–37)
ATRIAL RATE: 110 BPM
BASOPHILS # BLD: 0 K/UL (ref 0–0.1)
BASOPHILS NFR BLD: 0 % (ref 0–1)
BILIRUB SERPL-MCNC: 0.5 MG/DL (ref 0.2–1)
BUN SERPL-MCNC: 14 MG/DL (ref 6–20)
BUN/CREAT SERPL: 18 (ref 12–20)
CALCIUM SERPL-MCNC: 9.7 MG/DL (ref 8.5–10.1)
CALCULATED P AXIS, ECG09: -12 DEGREES
CALCULATED R AXIS, ECG10: -24 DEGREES
CALCULATED T AXIS, ECG11: 51 DEGREES
CHLORIDE SERPL-SCNC: 103 MMOL/L (ref 97–108)
CO2 SERPL-SCNC: 23 MMOL/L (ref 21–32)
COMMENT, HOLDF: NORMAL
CREAT SERPL-MCNC: 0.8 MG/DL (ref 0.55–1.02)
DIAGNOSIS, 93000: NORMAL
DIFFERENTIAL METHOD BLD: ABNORMAL
EOSINOPHIL # BLD: 0 K/UL (ref 0–0.4)
EOSINOPHIL NFR BLD: 0 % (ref 0–7)
ERYTHROCYTE [DISTWIDTH] IN BLOOD BY AUTOMATED COUNT: 12.5 % (ref 11.5–14.5)
GLOBULIN SER CALC-MCNC: 4.6 G/DL (ref 2–4)
GLUCOSE BLD STRIP.AUTO-MCNC: 112 MG/DL (ref 65–100)
GLUCOSE BLD STRIP.AUTO-MCNC: 132 MG/DL (ref 65–100)
GLUCOSE BLD STRIP.AUTO-MCNC: 99 MG/DL (ref 65–100)
GLUCOSE SERPL-MCNC: 149 MG/DL (ref 65–100)
HCT VFR BLD AUTO: 35 % (ref 35–47)
HGB BLD-MCNC: 11.1 G/DL (ref 11.5–16)
IMM GRANULOCYTES # BLD AUTO: 0 K/UL (ref 0–0.04)
IMM GRANULOCYTES NFR BLD AUTO: 0 % (ref 0–0.5)
LYMPHOCYTES # BLD: 0.9 K/UL (ref 0.8–3.5)
LYMPHOCYTES NFR BLD: 11 % (ref 12–49)
MCH RBC QN AUTO: 29.2 PG (ref 26–34)
MCHC RBC AUTO-ENTMCNC: 31.7 G/DL (ref 30–36.5)
MCV RBC AUTO: 92.1 FL (ref 80–99)
MONOCYTES # BLD: 0.5 K/UL (ref 0–1)
MONOCYTES NFR BLD: 6 % (ref 5–13)
NEUTS SEG # BLD: 6.1 K/UL (ref 1.8–8)
NEUTS SEG NFR BLD: 83 % (ref 32–75)
NRBC # BLD: 0 K/UL (ref 0–0.01)
NRBC BLD-RTO: 0 PER 100 WBC
P-R INTERVAL, ECG05: 124 MS
PLATELET # BLD AUTO: 430 K/UL (ref 150–400)
PMV BLD AUTO: 8.8 FL (ref 8.9–12.9)
POTASSIUM SERPL-SCNC: 4.3 MMOL/L (ref 3.5–5.1)
PROT SERPL-MCNC: 7.6 G/DL (ref 6.4–8.2)
Q-T INTERVAL, ECG07: 324 MS
QRS DURATION, ECG06: 64 MS
QTC CALCULATION (BEZET), ECG08: 438 MS
RBC # BLD AUTO: 3.8 M/UL (ref 3.8–5.2)
SAMPLES BEING HELD,HOLD: NORMAL
SERVICE CMNT-IMP: ABNORMAL
SERVICE CMNT-IMP: ABNORMAL
SERVICE CMNT-IMP: NORMAL
SODIUM SERPL-SCNC: 134 MMOL/L (ref 136–145)
TROPONIN I SERPL-MCNC: <0.05 NG/ML
VENTRICULAR RATE, ECG03: 110 BPM
WBC # BLD AUTO: 7.5 K/UL (ref 3.6–11)

## 2020-06-22 PROCEDURE — 74177 CT ABD & PELVIS W/CONTRAST: CPT

## 2020-06-22 PROCEDURE — 74011250636 HC RX REV CODE- 250/636: Performed by: EMERGENCY MEDICINE

## 2020-06-22 PROCEDURE — 74011636320 HC RX REV CODE- 636/320: Performed by: RADIOLOGY

## 2020-06-22 PROCEDURE — 71260 CT THORAX DX C+: CPT

## 2020-06-22 PROCEDURE — 99285 EMERGENCY DEPT VISIT HI MDM: CPT

## 2020-06-22 PROCEDURE — 74011000258 HC RX REV CODE- 258: Performed by: HOSPITALIST

## 2020-06-22 PROCEDURE — 93005 ELECTROCARDIOGRAM TRACING: CPT

## 2020-06-22 PROCEDURE — 84484 ASSAY OF TROPONIN QUANT: CPT

## 2020-06-22 PROCEDURE — 65270000032 HC RM SEMIPRIVATE

## 2020-06-22 PROCEDURE — 82962 GLUCOSE BLOOD TEST: CPT

## 2020-06-22 PROCEDURE — 36415 COLL VENOUS BLD VENIPUNCTURE: CPT

## 2020-06-22 PROCEDURE — 85025 COMPLETE CBC W/AUTO DIFF WBC: CPT

## 2020-06-22 PROCEDURE — 74220 X-RAY XM ESOPHAGUS 1CNTRST: CPT

## 2020-06-22 PROCEDURE — 80053 COMPREHEN METABOLIC PANEL: CPT

## 2020-06-22 RX ORDER — SODIUM CHLORIDE 0.9 % (FLUSH) 0.9 %
5-40 SYRINGE (ML) INJECTION EVERY 8 HOURS
Status: DISCONTINUED | OUTPATIENT
Start: 2020-06-22 | End: 2020-07-02 | Stop reason: HOSPADM

## 2020-06-22 RX ORDER — MAGNESIUM SULFATE 100 %
4 CRYSTALS MISCELLANEOUS AS NEEDED
Status: DISCONTINUED | OUTPATIENT
Start: 2020-06-22 | End: 2020-07-02 | Stop reason: HOSPADM

## 2020-06-22 RX ORDER — INSULIN LISPRO 100 [IU]/ML
INJECTION, SOLUTION INTRAVENOUS; SUBCUTANEOUS EVERY 6 HOURS
Status: DISCONTINUED | OUTPATIENT
Start: 2020-06-22 | End: 2020-07-02 | Stop reason: HOSPADM

## 2020-06-22 RX ORDER — SODIUM CHLORIDE 0.9 % (FLUSH) 0.9 %
10 SYRINGE (ML) INJECTION
Status: COMPLETED | OUTPATIENT
Start: 2020-06-22 | End: 2020-06-22

## 2020-06-22 RX ORDER — DEXTROSE MONOHYDRATE AND SODIUM CHLORIDE 5; .9 G/100ML; G/100ML
125 INJECTION, SOLUTION INTRAVENOUS CONTINUOUS
Status: DISPENSED | OUTPATIENT
Start: 2020-06-22 | End: 2020-06-23

## 2020-06-22 RX ORDER — SODIUM CHLORIDE 0.9 % (FLUSH) 0.9 %
5-40 SYRINGE (ML) INJECTION AS NEEDED
Status: DISCONTINUED | OUTPATIENT
Start: 2020-06-22 | End: 2020-07-02 | Stop reason: HOSPADM

## 2020-06-22 RX ORDER — DEXTROSE MONOHYDRATE 100 MG/ML
0-250 INJECTION, SOLUTION INTRAVENOUS AS NEEDED
Status: DISCONTINUED | OUTPATIENT
Start: 2020-06-22 | End: 2020-07-02 | Stop reason: HOSPADM

## 2020-06-22 RX ADMIN — SODIUM CHLORIDE 500 ML: 900 INJECTION, SOLUTION INTRAVENOUS at 11:23

## 2020-06-22 RX ADMIN — Medication 10 ML: at 18:28

## 2020-06-22 RX ADMIN — IOPAMIDOL 100 ML: 755 INJECTION, SOLUTION INTRAVENOUS at 18:28

## 2020-06-22 RX ADMIN — DEXTROSE MONOHYDRATE AND SODIUM CHLORIDE 125 ML/HR: 5; .9 INJECTION, SOLUTION INTRAVENOUS at 21:30

## 2020-06-22 NOTE — ACP (ADVANCE CARE PLANNING)
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 Discussion: Patient has advanced dementia such she was unable to participate in the goal of care conversation. This conversation was held with her caregiver and daughter Darci Vincent on the phone, [phone]. I discussed the x-ray findings with Ms. Amor Boyer I have potential esophageal cancer that she may need endoscopy, biopsy and probably feeding tube. I asked Amor Boyer if that is what her mom would want. She is undecided. I told her we will discuss this further tomorrow along with GI team.  We will also talked about resuscitative efforts in the event of cardiac and/or respiratory rest.  She tells me that she is talked with the ED doc. She would not want CPR or shock however she would want her to be intubated and put on mechanical ventilation for a short time if needed. Time Spent: 16 minutes, this conversion was done via phone.         Marisol Aguirre MD  Date of Service:  6/22/2020  6:25 PM

## 2020-06-22 NOTE — CONSULTS
118 [facility].  [address] [facility] [address], [address]  [phone]                     GI CONSULTATION NOTE      NAME:  Godfrey Wheat   :   1931   MRN:   [id number]     Consult Date: 2020     Chief Complaint: Failure to thrive    History of Present Illness:  Patient is a 80 y.o. who is seen in consultation at the request of Dr. Juanis Chavez for the above problem. Patient answers questions appropriately, but she has known dementia / Alzheimer's disease, so I also spoke to her daughter in the waiting room and reviewed patient's office chart for some of this history. Patient denies dysphagia or choking with eating, but her daughter reports that anything solid, even soft foods like bananas, cause coughing fits while eating. Liquids go down just fine. Recently patient has been holding food in her cheeks and not trying to swallow, and at times she refuses to even try to eat. Patient reported that she ate eggs, toast, OJ and coffee this morning, but the daughter refutes that. Patient has been drinking about 1.5 cans of non-dairy Ensure per day, but does not drink many liquids either. Daughter is concerned about dehydration, and has noticed that patient has been more weak and lethargic recently. She has lost about 40 lbs since January. She does not complain of nausea or abdominal pain, and daughter denies vomiting and diarrhea. The daughter does voice understanding that this may be a natural progression of the Alzheimer's, as she also reports patient has not been communicating much, doesn't talk or answer questions sometimes, seems more confused. Patient had been to see Dr. Marcellus Lincoln with our group on 6/15/20. The OV notes are as follows:   \"Patient presents for evaluation of dysphagia and weight loss. She has hx of CVA and Alzhimer's dementia. Per her daughter, she started having increased dysphagia at the beginning of this year with food pocketing.  She does not eat any solids - she does not even try to swallow most foods. She starts choking with soft foods such as bananas. She drinks liquids without any issues. She has lost about 40lbs since 1/2020. She has diarrhea with nutritional supplements such as ensure. She has been getting worse overall. Per her daughter, she does not talk much any more and has trouble following simple instructions. MBS in 9/2019 was normal. She is on ASA 81mg daily. No prior EGD. \"    Patient has had EGD with Dr. Lisa Light in 8/2018 for a food impaction. At that time, there was a chicken bolus at the [facility] and evidence of gastritis. Path as follows:  1. Stomach, antrum, biopsy:   Chronic active gastritis and features of reactive gastropathy   Detached acute inflammatory exudate, possibly ulcer   Immunohistochemical stain for Helicobacter will be reported in an addendum   2. Stomach, body, biopsy:   Chronic gastritis with patchy atrophy and intestinal metaplasia   3. Esophagus, lower, biopsy:   Mild acute esophagitis and reactive changes   GMS stain will be reported in an addendum   4. Esophagus, mid, biopsy:   Mild acute esophagitis and reactive changes   5. Esophagus, upper, biopsy:   Mild reactive changes       PMH:  Past Medical History:   Diagnosis Date    Anemia     Depression     [facility] [address]     Hypercholesterolemia     Hypertension     Stroke ([facility] [address] 09/2012    CVA; left sided weakness       PSH:  History reviewed. No pertinent surgical history. Allergies:  No Known Allergies    Home Medications:  Prior to Admission Medications   Prescriptions Last Dose Informant Patient Reported? Taking?   acetaminophen 650 mg Tab   No No   Sig: Take 650 mg by mouth every six (6) hours as needed.    amLODIPine (NORVASC) 10 mg tablet   No No   Sig: TAKE 1 TABLET DAILY   aspirin delayed-release 81 mg tablet   No No   Sig: Take 1 tablet by mouth once daily   atorvastatin (LIPITOR) 40 mg tablet   No No   Sig: TAKE 1 TABLET EVERY DAY   cloNIDine HCL (CATAPRES) 0.1 mg tablet   No No   Sig: TAKE 1 TABLET AT BEDTIME   ferrous sulfate 325 mg (65 mg iron) tablet   No No   Sig: TAKE 1 TABLET BY MOUTH IN THE MORNING BEFORE BREAKFAST   food supplemt, lactose-reduced (ENSURE CLEAR) liqd   No No   Sig: Take 237 mL by mouth two (2) times a day.   geriatric multivitamins & minerals (ELDERTONIC) elix   No No   Sig: Take 15 mL by mouth Before breakfast, lunch, and dinner. losartan-hydroCHLOROthiazide (HYZAAR) 100-25 mg per tablet   No No   Sig: TAKE 1 TABLET DAILY   metFORMIN (GLUCOPHAGE) 500 mg tablet   No No   Sig: TAKE 1 TABLET TWICE A DAY  WITH MEALS   mirtazapine (REMERON) 7.5 mg tablet   No No   Sig: TAKE 1 TABLET NIGHTLY   omeprazole (PRILOSEC) 20 mg capsule   No No   Sig: TAKE 1 CAPSULE DAILY FOR   GASTROESOPHAGEAL REFLUX    DISEASE   potassium chloride (K-DUR, KLOR-CON) 20 mEq tablet   No No   Sig: TAKE 2  BY MOUTH ONCE DAILY      Facility-Administered Medications: None       Hospital Medications:  No current facility-administered medications for this encounter. Current Outpatient Medications   Medication Sig    omeprazole (PRILOSEC) 20 mg capsule TAKE 1 CAPSULE DAILY FOR   GASTROESOPHAGEAL REFLUX    DISEASE    potassium chloride (K-DUR, KLOR-CON) 20 mEq tablet TAKE 2  BY MOUTH ONCE DAILY    aspirin delayed-release 81 mg tablet Take 1 tablet by mouth once daily    ferrous sulfate 325 mg (65 mg iron) tablet TAKE 1 TABLET BY MOUTH IN THE MORNING BEFORE BREAKFAST    atorvastatin (LIPITOR) 40 mg tablet TAKE 1 TABLET EVERY DAY    cloNIDine HCL (CATAPRES) 0.1 mg tablet TAKE 1 TABLET AT BEDTIME    amLODIPine (NORVASC) 10 mg tablet TAKE 1 TABLET DAILY    food supplemt, lactose-reduced (ENSURE CLEAR) liqd Take 237 mL by mouth two (2) times a day.     losartan-hydroCHLOROthiazide (HYZAAR) 100-25 mg per tablet TAKE 1 TABLET DAILY    metFORMIN (GLUCOPHAGE) 500 mg tablet TAKE 1 TABLET TWICE A DAY  WITH MEALS    mirtazapine (REMERON) 7.5 mg tablet TAKE 1 TABLET NIGHTLY    geriatric multivitamins & minerals (ELDERTONIC) elix Take 15 mL by mouth Before breakfast, lunch, and dinner.  acetaminophen 650 mg Tab Take 650 mg by mouth every six (6) hours as needed. Social History:  Social History     Tobacco Use    Smoking status: Former Smoker     Packs/day: 0.25     Last attempt to quit: 2012     Years since quittin.4    Smokeless tobacco: Never Used   Substance Use Topics    Alcohol use: No     Alcohol/week: 0.0 standard drinks       Family History:  Family History   Problem Relation Age of Onset    No Known Problems Mother     No Known Problems Father        Review of Systems:    Constitutional: negative fever, negative chills, positive weight loss  Eyes:   negative visual changes  ENT:   negative sore throat, tongue or lip swelling  Respiratory:  negative cough, negative dyspnea  Cards:  negative for chest pain, palpitations, lower extremity edema  GI:   See HPI  :  negative for frequency, dysuria  Integument:  negative for rash and pruritus  Heme:  negative for easy bruising and gum/nose bleeding  Musculoskel: negative for myalgias,  back pain  positive weakness  Neuro: negative for headaches, dizziness, vertigo  Positive dementia  Psych:  negative for feelings of anxiety, depression      Objective:     Patient Vitals for the past 8 hrs:   BP Temp Pulse Resp SpO2 Height Weight   20 1300     98 %     20 1245 118/63  85 22 98 %     20 1230 126/58  88 19 99 %     20 1200 110/59  83 17 98 %     20 1134   100 20 97 %     20 1131 112/66         20 1023 110/57 97.8 °F (36.6 °C) 84 20 99 % 5' 3\" (1.6 m) 37.6 kg (83 lb)     No intake/output data recorded. No intake/output data recorded. PHYSICAL EXAM:  General appearance: cooperative, no distress, appears stated age. Frail and cachectic in appearance  Skin: Extremities and face reveal no rashes or jaundice  HEENT: Sclerae anicteric.  Extra-occular muscles are intact. Cardiovascular: Regular rate and rhythm. Respiratory: Clear breath sounds with no wheezes, rales, or rhonchi. GI: Abdomen nondistended, soft, and nontender. Normal active bowel sounds. No enlargement of the liver or spleen. No masses palpable. Rectal: Deferred   Musculoskeletal: No edema of the lower legs. Neurological: Patient is alert. She answers questions appropriately but not sure accurately  Psychiatric: Mood appears appropriate, very pleasant. No anxiety or agitation. Data Review     Recent Labs     06/22/20  1051   WBC 7.5   HGB 11.1*   HCT 35.0   *     Recent Labs     06/22/20  1051   *   K 4.3      CO2 23   BUN 14   CREA 0.80   *   CA 9.7     Recent Labs     06/22/20  1051   AP 64   TP 7.6   ALB 3.0*   GLOB 4.6*     No results for input(s): INR, PTP, APTT, INREXT in the last 72 hours. Assessment / Plan :     Dysphagia  - May be OP dysphagia, vs food pocketing in her cheeks until she chokes  - BA swallow 6/22/20: In the distal esophagus, a circumferential mass extends over approximately 20 mm. This causes tight, persistent stenosis of the distal esophageal lumen, with  prolonged delay of oral contrast progression across the tumor and into the stomach. Esophagoesophageal reflux is associated. Smooth, lobulated projections of the tumor indent the superior-most stomach laterally. At the least, this is extrinsic mass effect. Direct invasion is likely. Inclusive of this, tumor extends over nearly 40 mm. Failure to thrive  Weight loss, unintentional  Anorexia  Alzheimer's    Patient being admitted. Will order CT abd/pelvis with contrast.  May benefit from EGD with biopsy and stent. Palliative care to be consulted as well.

## 2020-06-22 NOTE — PROGRESS NOTES
Admission Medication Reconciliation:    Information obtained from:  rx query  RxQuery data available¹:  YES    Comments/Recommendations: Updated PTA meds/reviewed patient's allergies. 1)  Unable to speak to patient given clinical condition. Previously reviewed PTA medication list aligns well with refill history. 2)  Medication changes (since last review): Added  - none    Adjusted  - none    Removed  - none       ¹RxQuery pharmacy benefit data reflects medications filled and processed through the patient's insurance, however   this data does NOT capture whether the medication was picked up or is currently being taken by the patient. Allergies:  Patient has no known allergies. Significant PMH/Disease States:   Past Medical History:   Diagnosis Date    Anemia     Depression     Diabetes ([facility] [address]     Hypercholesterolemia     Hypertension     Stroke ([facility] [address] 09/2012    CVA; left sided weakness     Chief Complaint for this Admission:    Chief Complaint   Patient presents with    Weight Loss     Prior to Admission Medications:   Prior to Admission Medications   Prescriptions Last Dose Informant Taking?   acetaminophen 650 mg Tab   Yes   Sig: Take 650 mg by mouth every six (6) hours as needed. amLODIPine (NORVASC) 10 mg tablet   Yes   Sig: TAKE 1 TABLET DAILY   aspirin delayed-release 81 mg tablet   Yes   Sig: Take 1 tablet by mouth once daily   atorvastatin (LIPITOR) 40 mg tablet   Yes   Sig: TAKE 1 TABLET EVERY DAY   cloNIDine HCL (CATAPRES) 0.1 mg tablet   Yes   Sig: TAKE 1 TABLET AT BEDTIME   ferrous sulfate 325 mg (65 mg iron) tablet   Yes   Sig: TAKE 1 TABLET BY MOUTH IN THE MORNING BEFORE BREAKFAST   food supplemt, lactose-reduced (ENSURE CLEAR) liqd   Yes   Sig: Take 237 mL by mouth two (2) times a day.   geriatric multivitamins & minerals (ELDERTONIC) elix   Yes   Sig: Take 15 mL by mouth Before breakfast, lunch, and dinner.    losartan-hydroCHLOROthiazide (HYZAAR) 100-25 mg per tablet   Yes   Sig: TAKE 1 TABLET DAILY   metFORMIN (GLUCOPHAGE) 500 mg tablet   Yes   Sig: TAKE 1 TABLET TWICE A DAY  WITH MEALS   mirtazapine (REMERON) 7.5 mg tablet   Yes   Sig: TAKE 1 TABLET NIGHTLY   omeprazole (PRILOSEC) 20 mg capsule   Yes   Sig: TAKE 1 CAPSULE DAILY FOR   GASTROESOPHAGEAL REFLUX    DISEASE   potassium chloride (K-DUR, KLOR-CON) 20 mEq tablet   Yes   Sig: TAKE 2  BY MOUTH ONCE DAILY      Facility-Administered Medications: None       Please contact the main inpatient pharmacy with any questions or concerns at [phone] and we will direct you to the clinical pharmacist covering this patient's care while in-house.    Windy Phan  '

## 2020-06-22 NOTE — ED PROVIDER NOTES
Failure to thrive and weight loss at home for the past 3 months. 45 pound weight loss since January. Only tolerating liquids. Will not tolerate solid food. History of dementia and unable to give history. Her daughter gives a history. She has seen Dr. Greer Choi who did an endoscopy few months ago which was normal.  No fevers or vomiting. Weight Loss           Past Medical History:   Diagnosis Date    Anemia     Depression     Diabetes ([facility] [address]     Hypercholesterolemia     Hypertension     Stroke ([facility] 2012    CVA; left sided weakness       History reviewed. No pertinent surgical history.       Family History:   Problem Relation Age of Onset    No Known Problems Mother     No Known Problems Father        Social History     Socioeconomic History    Marital status:      Spouse name: Not on file    Number of children: Not on file    Years of education: Not on file    Highest education level: Not on file   Occupational History    Not on file   Social Needs    Financial resource strain: Not on file    Food insecurity     Worry: Not on file     Inability: Not on file    Transportation needs     Medical: Not on file     Non-medical: Not on file   Tobacco Use    Smoking status: Former Smoker     Packs/day: 0.25     Last attempt to quit: 2012     Years since quittin.4    Smokeless tobacco: Never Used   Substance and Sexual Activity    Alcohol use: No     Alcohol/week: 0.0 standard drinks    Drug use: No    Sexual activity: Not Currently     Comment: has one grown child   Lifestyle    Physical activity     Days per week: Not on file     Minutes per session: Not on file    Stress: Not on file   Relationships    Social connections     Talks on phone: Not on file     Gets together: Not on file     Attends Taoist service: Not on file     Active member of club or organization: Not on file     Attends meetings of clubs or organizations: Not on file     Relationship status: Not on file  Intimate partner violence     Fear of current or ex partner: Not on file     Emotionally abused: Not on file     Physically abused: Not on file     Forced sexual activity: Not on file   Other Topics Concern    Not on file   Social History Narrative    Not on file         ALLERGIES: Patient has no known allergies. Review of Systems   All other systems reviewed and are negative. Vitals:    06/22/20 1230 06/22/20 1245 06/22/20 1300 06/22/20 1300   BP: 126/58 118/63 115/71    Pulse: 88 85 85    Resp: 19 22 21    Temp:       SpO2: 99% 98% 97% 98%   Weight:       Height:                Physical Exam  Vitals signs and nursing note reviewed. Constitutional:       General: She is not in acute distress. HENT:      Head: Normocephalic and atraumatic. Mouth/Throat:      Mouth: Mucous membranes are moist.   Eyes:      General: No scleral icterus. Conjunctiva/sclera: Conjunctivae normal.      Pupils: Pupils are equal, round, and reactive to light. Neck:      Musculoskeletal: Neck supple. Trachea: No tracheal deviation. Cardiovascular:      Rate and Rhythm: Normal rate and regular rhythm. Pulmonary:      Effort: Pulmonary effort is normal. No respiratory distress. Breath sounds: No wheezing or rales. Abdominal:      General: There is no distension. Palpations: Abdomen is soft. Tenderness: There is no abdominal tenderness. Genitourinary:     Comments: deferred  Musculoskeletal:         General: No deformity. Skin:     General: Skin is warm and dry. Neurological:      Mental Status: She is alert. Mental status is at baseline. Psychiatric:         Mood and Affect: Mood normal.          [facility]       Procedures      Hospitalist Perfect Serve for Admission  4:15 PM    ED Room Number: ER08/08  Patient Name and age:  Patricia Wilson 80 y.o.  female  Working Diagnosis:   1.  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

## 2020-06-22 NOTE — H&P
HISTORY AND PHYSICAL  Anabell MD Steffany        PCP: Sanju Dunn, DO    Please note that this dictation was completed with [employer], the computer voice recognition software. Quite often unanticipated grammatical, syntax, homophones, and other interpretive errors are inadvertently transcribed by the computer software. Please disregard these errors. Please excuse any errors that have escaped final proofreading. CHIEF COMPLAINTS    Poor oral intake, weight loss, failure to thrive    HISTORY OF PRESENT ILLNESS   This is an 77-year-old female with a significant history of dementia, diabetes, hypertension, hyper lipidemia, stroke, depression and anemia was brought to the ED by her daughter for failure to thrive. Patient has dysphagia to solid food tolerating only liquids. She reportedly had a negative endoscopy few months back. Patients with advanced dementia and confused as such unable to provide any meaningful history. Daughter is not present, could not reach via phone. 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 Patient already seen by GI team  PMH/PSH:  Past Medical History:   Diagnosis Date    Anemia     Depression     [facility] ([facility] [address]     Hypercholesterolemia     Hypertension     Stroke ([facility] [address] 09/2012    CVA; left sided weakness     History reviewed. No pertinent surgical history. Home meds:   Prior to Admission medications    Medication Sig Start Date End Date Taking?  Authorizing Provider   omeprazole (PRILOSEC) 20 mg capsule TAKE 1 CAPSULE DAILY FOR   GASTROESOPHAGEAL REFLUX    DISEASE 6/15/20  Yes Marcello Tamayo DO   potassium chloride (K-DUR, KLOR-CON) 20 mEq tablet TAKE 2  BY MOUTH ONCE DAILY 5/18/20  Yes Marcello Tamayo DO   aspirin delayed-release 81 mg tablet Take 1 tablet by mouth once daily 5/5/20  Yes Anastasiya Rhodes DO   ferrous sulfate 325 mg (65 mg iron) tablet TAKE 1 TABLET BY MOUTH IN THE MORNING BEFORE BREAKFAST 20  Yes Marcello Tamayo DO   atorvastatin (LIPITOR) 40 mg tablet TAKE 1 TABLET EVERY DAY 3/20/20  Yes Marcello Tamayo DO   cloNIDine HCL (CATAPRES) 0.1 mg tablet TAKE 1 TABLET AT BEDTIME 3/11/20  Yes Agatha Feliz NP   amLODIPine (NORVASC) 10 mg tablet TAKE 1 TABLET DAILY 20  Yes Marcello Tamayo DO   food supplemt, lactose-reduced (ENSURE CLEAR) liqd Take 237 mL by mouth two (2) times a day. 19  Yes Agatha Feliz NP   losartan-hydroCHLOROthiazide (HYZAAR) 100-25 mg per tablet TAKE 1 TABLET DAILY 10/30/19  Yes Agatha Feliz NP   metFORMIN (GLUCOPHAGE) 500 mg tablet TAKE 1 TABLET TWICE A DAY  WITH MEALS 10/23/19  Yes Agatha Feliz NP   mirtazapine (REMERON) 7.5 mg tablet TAKE 1 TABLET NIGHTLY 19  Yes Mayi Tamayo DO   geriatric multivitamins & minerals (ELDERTONIC) elix Take 15 mL by mouth Before breakfast, lunch, and dinner. 19  Yes Mayi Tamayo DO   acetaminophen 650 mg Tab Take 650 mg by mouth every six (6) hours as needed. 12  Yes Dea Dean MD       Allergies:  No Known Allergies    FH:  Family History   Problem Relation Age of Onset    No Known Problems Mother     No Known Problems Father        SH:  Social History     Tobacco Use    Smoking status: Former Smoker     Packs/day: 0.25     Last attempt to quit: 2012     Years since quittin.4    Smokeless tobacco: Never Used   Substance Use Topics    Alcohol use: No     Alcohol/week: 0.0 standard drinks       ROS: Review of systems not obtained due to patient factors. PHYSICAL EXAM:  Visit Vitals  /71   Pulse 85   Temp 97.8 °F (36.6 °C)   Resp 21   Ht 5' 3\" (1.6 m)   Wt 37.6 kg (83 lb)   SpO2 98%   BMI 14.70 kg/m²       General:          Alert, cooperative, no distress, appears stated age.      HEENT:           Atraumatic, anicteric sclerae, pink conjunctivae No oral ulcers, mucosa moist, throat clear, dentition fair  Neck:               Supple, symmetrical,  thyroid: non tender  Lungs:             Clear to auscultation bilaterally. No Wheezing or Rhonchi. No rales. Chest wall:      No tenderness  No Accessory muscle use. Heart:              Regular  rhythm,  No  murmur   No edema  Abdomen:        Soft, non-tender. Not distended. Bowel sounds normal  Extremities:     No cyanosis. No clubbing,                            Skin turgor normal, Capillary refill normal, Radial dial pulse 2+  Skin:                Not pale. Not Jaundiced  No rashes   Psych:             Not anxious or agitated. Neurologic:     Alert and oriented to PPT, CNII-XII intact. Motor and sensory exam grossly intact.   Labs/Imaging:  Recent Results (from the past 24 hour(s))   EKG, 12 LEAD, INITIAL    Collection Time: 06/22/20 10:44 AM   Result Value Ref Range    Ventricular Rate 110 BPM    Atrial Rate 110 BPM    P-R Interval 124 ms    QRS Duration 64 ms    Q-T Interval 324 ms    QTC Calculation (Bezet) 438 ms    Calculated P Axis -12 degrees    Calculated R Axis -24 degrees    Calculated T Axis 51 degrees    Diagnosis       Sinus tachycardia with premature atrial complexes  Septal infarct , age undetermined  Inferior infarct (cited on or before 02-AUG-2018)  When compared with ECG of 02-DEC-2019 05:49,  premature atrial complexes are now present  Nonspecific T wave abnormality now evident in Lateral leads  Confirmed by Roddy Low M.D., [facility] ([id number]) on 6/22/2020 1:48:30 PM     CBC WITH AUTOMATED DIFF    Collection Time: 06/22/20 10:51 AM   Result Value Ref Range    WBC 7.5 3.6 - 11.0 K/uL    RBC 3.80 3.80 - 5.20 M/uL    HGB 11.1 (L) 11.5 - 16.0 g/dL    HCT 35.0 35.0 - 47.0 %    MCV 92.1 80.0 - 99.0 FL    MCH 29.2 26.0 - 34.0 PG    MCHC 31.7 30.0 - 36.5 g/dL    RDW 12.5 11.5 - 14.5 %    PLATELET 496 (H) 737 - 400 K/uL    MPV 8.8 (L) 8.9 - 12.9 FL    NRBC 0.0 0  WBC    ABSOLUTE NRBC 0.00 0.00 - 0.01 K/uL    NEUTROPHILS 83 (H) 32 - 75 %    LYMPHOCYTES 11 (L) 12 - 49 %    MONOCYTES 6 5 - 13 %    EOSINOPHILS 0 0 - 7 %    BASOPHILS 0 0 - 1 %    IMMATURE GRANULOCYTES 0 0.0 - 0.5 %    ABS. NEUTROPHILS 6.1 1.8 - 8.0 K/UL    ABS. LYMPHOCYTES 0.9 0.8 - 3.5 K/UL    ABS. MONOCYTES 0.5 0.0 - 1.0 K/UL    ABS. EOSINOPHILS 0.0 0.0 - 0.4 K/UL    ABS. BASOPHILS 0.0 0.0 - 0.1 K/UL    ABS. IMM. GRANS. 0.0 0.00 - 0.04 K/UL    DF AUTOMATED     METABOLIC PANEL, COMPREHENSIVE    Collection Time: 06/22/20 10:51 AM   Result Value Ref Range    Sodium 134 (L) 136 - 145 mmol/L    Potassium 4.3 3.5 - 5.1 mmol/L    Chloride 103 97 - 108 mmol/L    CO2 23 21 - 32 mmol/L    Anion gap 8 5 - 15 mmol/L    Glucose 149 (H) 65 - 100 mg/dL    BUN 14 6 - 20 MG/DL    Creatinine 0.80 0.55 - 1.02 MG/DL    BUN/Creatinine ratio 18 12 - 20      GFR est AA >60 >60 ml/min/1.73m2    GFR est non-AA >60 >60 ml/min/1.73m2    Calcium 9.7 8.5 - 10.1 MG/DL    Bilirubin, total 0.5 0.2 - 1.0 MG/DL    ALT (SGPT) 11 (L) 12 - 78 U/L    AST (SGOT) 13 (L) 15 - 37 U/L    Alk. phosphatase 64 45 - 117 U/L    Protein, total 7.6 6.4 - 8.2 g/dL    Albumin 3.0 (L) 3.5 - 5.0 g/dL    Globulin 4.6 (H) 2.0 - 4.0 g/dL    A-G Ratio 0.7 (L) 1.1 - 2.2     SAMPLES BEING HELD    Collection Time: 06/22/20 10:51 AM   Result Value Ref Range    SAMPLES BEING HELD 1BLU,1RED     COMMENT        Add-on orders for these samples will be processed based on acceptable specimen integrity and analyte stability, which may vary by analyte.    TROPONIN I    Collection Time: 06/22/20 10:51 AM   Result Value Ref Range    Troponin-I, Qt. <0.05 <0.05 ng/mL       Recent Labs     06/22/20  1051   WBC 7.5   HGB 11.1*   HCT 35.0   *     Recent Labs     06/22/20  1051   *   K 4.3      CO2 23   BUN 14   CREA 0.80   *   CA 9.7     Recent Labs     06/22/20  1051   ALT 11*   AP 64   TBILI 0.5   TP 7.6   ALB 3.0*   GLOB 4.6*       Recent Labs     06/22/20  1051   Anthony Lehman <0.05       No results for input(s): INR, PTP, APTT, INREXT in the last 72 hours. No results for input(s): PH, PCO2, PO2 in the last 72 hours. XR BA SWALLOW ESOPHOGRAM  Narrative: INDICATION: Dysphagia. COMPARISON: None. TECHNIQUE: Thin barium was administered and multiple fluoroscopic radiographs  were obtained for a single contrast esophagram.    FINDINGS:   In the distal esophagus, a circumferential mass extends over approximately 20  mm. This causes tight, persistent stenosis of the distal esophageal lumen, with  prolonged delay of oral contrast progression across the tumor and into the  stomach. Esophagoesophageal reflux is associated. Smooth, lobulated projections  of the tumor indent the superior-most stomach laterally. At the least, this is  extrinsic mass effect. Direct invasion is likely. Inclusive of this, tumor  extends over nearly 40 mm. Impression: IMPRESSION:  1. Distal esophageal/GE junction carcinoma. 2. Tight, persistent stenosis of the distal esophagus. Prolonged delay of thin  liquid across the tumor into the stomach. Air kerma (radiation dose): 150.3 mGy    The findings were called to Dr. Camila Casas on 6/22/2020 3:54 PM by Dr. Jennifer Gusman. [phone]          Assessment & Plan:  Dysphagia due to distal esophageal stricture due to tumor   FTT. Severe protein caloric malnutrition   Body mass index is 14.7 kg/m². Dehydration. Mild hyponatremia  -Admit to medical floor  -NPO  -D5NS 125 ml/hr  -CT chest/abdomn/pelvis  -GI on board. Advanced dementia with FTT  -Reorient. Fall precaution  -On Remeron    Hypertension. BP low normal.Held all antihypertensives. DM  -Accucheks. Humalog high sensitivity SS. Hold metformin    Patient's Baseline: Ambulates with walking  DVT ppx: scd  Code status: Full code,patient does not have documentation otherwise. Patient with advanced dementia and family not available/reachable at present.   -Plan to have goal of care discussion,code status when family is available/reachable.   Disposition: TBD                Signed By: Salazar Roy MD     June 22, 2020

## 2020-06-22 NOTE — ROUTINE PROCESS
TRANSFER - OUT REPORT: 
 
Verbal report given to Tracy Noriega RN(name) on [address]  being transferred to (unit) for routine progression of care Report consisted of patients Situation, Background, Assessment and  
Recommendations(SBAR). Information from the following report(s) SBAR, ED Summary, [facility] and Recent Results was reviewed with the receiving nurse. Lines:  
Peripheral IV 06/22/20 Right Antecubital (Active) Site Assessment Clean, dry, & intact 6/22/2020  5:41 PM  
Phlebitis Assessment 0 6/22/2020  5:41 PM  
Infiltration Assessment 0 6/22/2020  5:41 PM  
Dressing Status Clean, dry, & intact 6/22/2020  5:41 PM  
Dressing Type Transparent 6/22/2020  5:41 PM  
Hub Color/Line Status Patent; Flushed;Capped;Pink 6/22/2020  5:41 PM  
  
 
Opportunity for questions and clarification was provided. Patient transported with: 
 [employer]

## 2020-06-23 ENCOUNTER — HOSPICE ADMISSION (OUTPATIENT)
Dept: HOSPICE | Facility: HOSPICE | Age: 85
End: 2020-06-23

## 2020-06-23 LAB
ALBUMIN SERPL-MCNC: 2.6 G/DL (ref 3.5–5)
ALBUMIN/GLOB SERPL: 0.6 {RATIO} (ref 1.1–2.2)
ALP SERPL-CCNC: 58 U/L (ref 45–117)
ALT SERPL-CCNC: 10 U/L (ref 12–78)
ANION GAP SERPL CALC-SCNC: 7 MMOL/L (ref 5–15)
AST SERPL-CCNC: 11 U/L (ref 15–37)
BASOPHILS # BLD: 0 K/UL (ref 0–0.1)
BASOPHILS NFR BLD: 0 % (ref 0–1)
BILIRUB SERPL-MCNC: 0.4 MG/DL (ref 0.2–1)
BUN SERPL-MCNC: 10 MG/DL (ref 6–20)
BUN/CREAT SERPL: 18 (ref 12–20)
CALCIUM SERPL-MCNC: 9.2 MG/DL (ref 8.5–10.1)
CHLORIDE SERPL-SCNC: 105 MMOL/L (ref 97–108)
CO2 SERPL-SCNC: 24 MMOL/L (ref 21–32)
CREAT SERPL-MCNC: 0.55 MG/DL (ref 0.55–1.02)
DIFFERENTIAL METHOD BLD: ABNORMAL
EOSINOPHIL # BLD: 0.1 K/UL (ref 0–0.4)
EOSINOPHIL NFR BLD: 1 % (ref 0–7)
ERYTHROCYTE [DISTWIDTH] IN BLOOD BY AUTOMATED COUNT: 12.3 % (ref 11.5–14.5)
GLOBULIN SER CALC-MCNC: 4.1 G/DL (ref 2–4)
GLUCOSE BLD STRIP.AUTO-MCNC: 116 MG/DL (ref 65–100)
GLUCOSE BLD STRIP.AUTO-MCNC: 128 MG/DL (ref 65–100)
GLUCOSE BLD STRIP.AUTO-MCNC: 131 MG/DL (ref 65–100)
GLUCOSE BLD STRIP.AUTO-MCNC: 160 MG/DL (ref 65–100)
GLUCOSE BLD STRIP.AUTO-MCNC: 95 MG/DL (ref 65–100)
GLUCOSE BLD STRIP.AUTO-MCNC: 95 MG/DL (ref 65–100)
GLUCOSE SERPL-MCNC: 130 MG/DL (ref 65–100)
HCT VFR BLD AUTO: 30.1 % (ref 35–47)
HGB BLD-MCNC: 9.9 G/DL (ref 11.5–16)
IMM GRANULOCYTES # BLD AUTO: 0 K/UL (ref 0–0.04)
IMM GRANULOCYTES NFR BLD AUTO: 0 % (ref 0–0.5)
LYMPHOCYTES # BLD: 0.9 K/UL (ref 0.8–3.5)
LYMPHOCYTES NFR BLD: 14 % (ref 12–49)
MAGNESIUM SERPL-MCNC: 1.4 MG/DL (ref 1.6–2.4)
MCH RBC QN AUTO: 29.2 PG (ref 26–34)
MCHC RBC AUTO-ENTMCNC: 32.9 G/DL (ref 30–36.5)
MCV RBC AUTO: 88.8 FL (ref 80–99)
MONOCYTES # BLD: 0.5 K/UL (ref 0–1)
MONOCYTES NFR BLD: 8 % (ref 5–13)
NEUTS SEG # BLD: 5.3 K/UL (ref 1.8–8)
NEUTS SEG NFR BLD: 77 % (ref 32–75)
NRBC # BLD: 0 K/UL (ref 0–0.01)
NRBC BLD-RTO: 0 PER 100 WBC
PHOSPHATE SERPL-MCNC: 2.9 MG/DL (ref 2.6–4.7)
PLATELET # BLD AUTO: 382 K/UL (ref 150–400)
PMV BLD AUTO: 9.1 FL (ref 8.9–12.9)
POTASSIUM SERPL-SCNC: 3.5 MMOL/L (ref 3.5–5.1)
PROT SERPL-MCNC: 6.7 G/DL (ref 6.4–8.2)
RBC # BLD AUTO: 3.39 M/UL (ref 3.8–5.2)
SARS-COV-2, COV2: NOT DETECTED
SERVICE CMNT-IMP: ABNORMAL
SERVICE CMNT-IMP: NORMAL
SERVICE CMNT-IMP: NORMAL
SODIUM SERPL-SCNC: 136 MMOL/L (ref 136–145)
SOURCE, COVRS: NORMAL
SPECIMEN SOURCE, FCOV2M: NORMAL
WBC # BLD AUTO: 6.8 K/UL (ref 3.6–11)

## 2020-06-23 PROCEDURE — 83735 ASSAY OF MAGNESIUM: CPT

## 2020-06-23 PROCEDURE — 87635 SARS-COV-2 COVID-19 AMP PRB: CPT

## 2020-06-23 PROCEDURE — 36415 COLL VENOUS BLD VENIPUNCTURE: CPT

## 2020-06-23 PROCEDURE — 85025 COMPLETE CBC W/AUTO DIFF WBC: CPT

## 2020-06-23 PROCEDURE — 82962 GLUCOSE BLOOD TEST: CPT

## 2020-06-23 PROCEDURE — 65270000032 HC RM SEMIPRIVATE

## 2020-06-23 PROCEDURE — 80053 COMPREHEN METABOLIC PANEL: CPT

## 2020-06-23 PROCEDURE — 84100 ASSAY OF PHOSPHORUS: CPT

## 2020-06-23 RX ADMIN — Medication 10 ML: at 22:00

## 2020-06-23 RX ADMIN — Medication 10 ML: at 14:00

## 2020-06-23 NOTE — PROGRESS NOTES
CM noted hospice consult, spoke with attending MD. Apparently family would like to speak with hospice for an informational session and discuss hospice vs peg. They have already spoken with palliative care. CM sent referral to [facility].      Alejandra COSTA, ACM

## 2020-06-23 NOTE — PROGRESS NOTES
SLP Contact Note    Consult received and appreciated. Patient chart reviewed. Patient with dysphagia 2/2 esophageal stricture 2/2 tumor. Pt with no penetration/aspiration on upper GI. Had a normal MBS in 2019. GI is on board. Therefore, will defer to GI for diet initiation. No SLP needs.       Thank you,  DERIK Hou.Ed, [id number] [facility]  Speech-Language Pathologist

## 2020-06-23 NOTE — PROGRESS NOTES
Spiritual Care Assessment/Progress Note  [address]      NAME: Colonel Garrison      MRN: [id number]  AGE: 80 y.o. SEX: female  Yazidism Affiliation: Episcopalian   Language: English     6/23/2020     Total Time (in minutes): 10     Spiritual Assessment begun in [facility] [id number] through conversation with:         [x]Patient        [] Family    [] Friend(s)        Reason for Consult: Palliative Care, Initial/Spiritual Assessment     Spiritual beliefs: (Please include comment if needed)     [] Identifies with a colten tradition:         [] Supported by a colten community:            [] Claims no spiritual orientation:           [] Seeking spiritual identity:                [] Adheres to an individual form of spirituality:           [x] Not able to assess:                           Identified resources for coping:      [] Prayer                               [] Music                  [] Guided Imagery     [x] Family/friends                 [] Pet visits     [] Devotional reading                         [] Unknown     [] Other:                                               Interventions offered during this visit: (See comments for more details)    Patient Interventions: Affirmation of emotions/emotional suffering           Plan of Care:     [] Support spiritual and/or cultural needs    [] Support AMD and/or advance care planning process      [] Support grieving process   [] Coordinate Rites and/or Rituals    [] Coordination with community clergy   [] No spiritual needs identified at this time   [] Detailed Plan of Care below (See Comments)  [] Make referral to Music Therapy  [] Make referral to Pet Therapy     [] Make referral to Addiction services  [] Make referral to [facility]  [] Make referral to Spiritual Care Partner  [] No future visits requested        [x] Follow up visits as needed     Initial spiritual assessment for pt who is a new palliative consult.  Brief exchange with pt who was only able to briefly verbalize 's presence. Pt had sitter at bedside. Consulted pt's nurse, Rachel Vargas RN. Chaplains will continue to offer support as needed.     Chaplain Ashley, MDiv, MS, [facility]  [phone]

## 2020-06-23 NOTE — PROGRESS NOTES
[address] [facility].  [address] [address] [address], [address]  [phone]                     GI PROGRESS NOTE    Patient Name: Lizzy Rogel      : 1931      MRN: [id number]  Admit Date: 2020  Today's Date: 2020    Subjective:     She is resting comfortably in bed. She denies abdominal pain, nausea and vomiting. She is starting to feel a little hungry. Objective:     Blood pressure 163/78, pulse 77, temperature 97.6 °F (36.4 °C), resp. rate 17, height 5' 3\" (1.6 m), weight 37.6 kg (83 lb), SpO2 98 %. Physical Exam:  General appearance: cooperative, no distress, appears stated age. Frail and cachectic in appearance  Skin: Extremities and face reveal no rashes or jaundice  HEENT: Sclerae anicteric. Extra-occular muscles are intact. Cardiovascular: Regular rate and rhythm. Respiratory: Clear breath sounds with no wheezes, rales, or rhonchi. GI: Abdomen nondistended, soft, and nontender. Normal active bowel sounds. No enlargement of the liver or spleen. No masses palpable. Rectal: Deferred   Musculoskeletal: No edema of the lower legs. Neurological: Patient is alert. She answers questions appropriately but not sure accurately  Psychiatric: Mood appears appropriate, very pleasant. No anxiety or agitation.     Data Review:    Recent Results (from the past 24 hour(s))   GLUCOSE, POC    Collection Time: 20  7:04 PM   Result Value Ref Range    Glucose (POC) 132 (H) 65 - 100 mg/dL    Performed by Viviana Yeung    GLUCOSE, POC    Collection Time: 20 10:10 PM   Result Value Ref Range    Glucose (POC) 112 (H) 65 - 100 mg/dL    Performed by Galina Ng    GLUCOSE, POC    Collection Time: 20 11:45 PM   Result Value Ref Range    Glucose (POC) 99 65 - 100 mg/dL    Performed by Jimi Ibarra \"Tory\"    CBC WITH AUTOMATED DIFF    Collection Time: 20  2:39 AM   Result Value Ref Range    WBC 6.8 3.6 - 11.0 K/uL    RBC 3.39 (L) 3.80 - 5.20 M/uL    HGB 9.9 (L) 11.5 - 16.0 g/dL    HCT 30.1 (L) 35.0 - 47.0 %    MCV 88.8 80.0 - 99.0 FL    MCH 29.2 26.0 - 34.0 PG    MCHC 32.9 30.0 - 36.5 g/dL    RDW 12.3 11.5 - 14.5 %    PLATELET 215 758 - 909 K/uL    MPV 9.1 8.9 - 12.9 FL    NRBC 0.0 0  WBC    ABSOLUTE NRBC 0.00 0.00 - 0.01 K/uL    NEUTROPHILS 77 (H) 32 - 75 %    LYMPHOCYTES 14 12 - 49 %    MONOCYTES 8 5 - 13 %    EOSINOPHILS 1 0 - 7 %    BASOPHILS 0 0 - 1 %    IMMATURE GRANULOCYTES 0 0.0 - 0.5 %    ABS. NEUTROPHILS 5.3 1.8 - 8.0 K/UL    ABS. LYMPHOCYTES 0.9 0.8 - 3.5 K/UL    ABS. MONOCYTES 0.5 0.0 - 1.0 K/UL    ABS. EOSINOPHILS 0.1 0.0 - 0.4 K/UL    ABS. BASOPHILS 0.0 0.0 - 0.1 K/UL    ABS. IMM. GRANS. 0.0 0.00 - 0.04 K/UL    DF AUTOMATED     METABOLIC PANEL, COMPREHENSIVE    Collection Time: 06/23/20  2:39 AM   Result Value Ref Range    Sodium 136 136 - 145 mmol/L    Potassium 3.5 3.5 - 5.1 mmol/L    Chloride 105 97 - 108 mmol/L    CO2 24 21 - 32 mmol/L    Anion gap 7 5 - 15 mmol/L    Glucose 130 (H) 65 - 100 mg/dL    BUN 10 6 - 20 MG/DL    Creatinine 0.55 0.55 - 1.02 MG/DL    BUN/Creatinine ratio 18 12 - 20      GFR est AA >60 >60 ml/min/1.73m2    GFR est non-AA >60 >60 ml/min/1.73m2    Calcium 9.2 8.5 - 10.1 MG/DL    Bilirubin, total 0.4 0.2 - 1.0 MG/DL    ALT (SGPT) 10 (L) 12 - 78 U/L    AST (SGOT) 11 (L) 15 - 37 U/L    Alk.  phosphatase 58 45 - 117 U/L    Protein, total 6.7 6.4 - 8.2 g/dL    Albumin 2.6 (L) 3.5 - 5.0 g/dL    Globulin 4.1 (H) 2.0 - 4.0 g/dL    A-G Ratio 0.6 (L) 1.1 - 2.2     SARS-COV-2    Collection Time: 06/23/20  4:32 AM   Result Value Ref Range    Specimen source Nasopharyngeal      SARS-CoV-2 PENDING     Specimen source Nasopharyngeal     GLUCOSE, POC    Collection Time: 06/23/20  6:31 AM   Result Value Ref Range    Glucose (POC) 160 (H) 65 - 100 mg/dL    Performed by Angela Valdez    GLUCOSE, POC    Collection Time: 06/23/20 11:21 AM   Result Value Ref Range    Glucose (POC) 128 (H) 65 - 100 mg/dL    Performed by Deep Harry Assessment / Plan :     Esophageal cancer  - BA swallow 6/22/20: In the distal esophagus, a circumferential mass extends over approximately 20 mm. This causes tight, persistent stenosis of the distal esophageal lumen, with  prolonged delay of oral contrast progression across the tumor and into the stomach. Esophagoesophageal reflux is associated. Smooth, lobulated projections of the tumor indent the superior-most stomach laterally. At the least, this is extrinsic mass effect. Direct invasion is likely. Inclusive of this, tumor extends over nearly 40 mm. -  CT chest, abdomen, pelvis 6/22/20:     1. Distal esophageal mass extending into the adjacent soft tissues and partially  narrowing the left inferior pulmonary vein     2. Left pleural effusion with question pleural-based metastasis     3. 9 mm nodule in the right upper lobe. It is partially calcified. 4. Indeterminate splenic lesion measuring 3.5 cm     5. 3.3 cm infrarenal abdominal aortic aneurysm with occlusion of the right  common iliac artery, internal iliac artery and external iliac artery with  reconstitution of the right common femoral artery by collaterals     6. 2.7 cm cystic pancreatic lesion     7. 3 cm right thyroid nodule    - Reviewed chart - Dr. Edna Wright had discussed the possibility of EGD with biopsy and possible feeding tube. Daughter was undecided. - At this time, we will await the [facility] notes and then plan accordingly. May need esophageal stent vs PEG tube depending on how the family wants to proceed. - No need for NPO for us today. Informed Dr. Edna Wright. - Daughter is Severo Furl - [phone]    Failure to thrive  Weight loss, unintentional  Anorexia  Alzheimer's        Patient Active Hospital Problem List:   Active Problems:    Dysphagia (6/22/2020)      FTT (failure to thrive) in adult (6/22/2020)      Tumor of esophagus (6/22/2020)      I have examined the patient.   I have reviewed the chart and agree with the documentation recorded by the NP, including the assessment, treatment plan, and disposition.       Harry Mendoza MD

## 2020-06-23 NOTE — PROGRESS NOTES
Bedside shift change report given to Lissette Lambert RN (oncoming nurse) by Ariel Hoskins RN (offgoing nurse). Report included the following information SBAR, Kardex, Intake/Output and MAR.

## 2020-06-23 NOTE — HOSPICE
[facility]  [phone]    Nursing Note   Patient Name: Shanda Velazquez  YOB: 1931  Age: 80 y.o. Vijay Montemayor RN Note:  Hospice consult noted. Chart reviewed. Called Sherri Ruiz/zee at number listed in demographics, [phone]. Call when to voicemail with message that the voicemail box has not been set-up. Will attempt to call daughter tomorrow. If there are any questions, please contact Vijay Montemayor at [phone]. Thank you for the opportunity to be of service to this patient and her family.

## 2020-06-23 NOTE — CONSULTS
Palliative Medicine Consult  [place]: [phone] ([phone])    Patient Name: Mike Loja  YOB: 1931    Date of Initial Consult: 6/23/2020  Reason for Consult: Care decisions  Requesting Provider: Dr. Ifrah Pappas   Primary Care Physician: Tracy Mendez DO     SUMMARY:   Mike Loja is a 80 y.o. with a past history of Alzheimer's dementia, CVA, diabetes, hyperlipidemia, who was admitted on 6/22/2020 from home with a diagnosis of difficulty swallowing. Current medical issues leading to Palliative Medicine involvement include: Care decisions. Chart reviewedpatient is a 80-year-old female with underlying Alzheimer's dementia who also status post CVA in 2012. She presents to the hospital with swallowing solids as well as significant weight loss of approximately 40 pounds over the last 5 months. Patient had barium swallow done which showed distal esophageal/GE junction carcinoma with tight, persistent stenosis of the distal esophagus. There is prolonged delay of thin liquid across the tumor into the stomach. CT scan showed distal esophageal mass extending into the adjacent soft tissues and partial narrowing in the left inferior pulmonary vein, left pleural effusion with questionable pleural-based metastasis, 9 mm nodule in the right upper lobe, indeterminate splenic lesion, 2.7 cm cystic pancreatic lesion. Patient has had significant decline over the last several months according to her daughter. She was barely even tolerating liquid prior to admission. Our team has been asked to see her for goals of care    Social historypatient is . Been living with her daughterSherricarol black since 2012. Kathy Catalan is an only child     PALLIATIVE DIAGNOSES:   1. GOC discussion  2. DNR discussion  3. Esophageal cancer  4. Dementia  5. Weight loss  6. Debility       PLAN:   1. Met with patient. She is pleasantly confused. Denies any pain.   Cannot have any conversation about her medical issues. 2. Talked with [facility] via phone. Reviewed the role of palliative medicine and then discussed the current medical issues. Reviewed all of the scans/x-rays had been done. Discussed the fact patient appears to have stage IV esophageal cancer. Patient's daughter is very clear that patient has had a significant decline over the last several months. She has had significant weight loss. We discussed typically would happen with this type of cancer-EGD, biopsy, feeding tubelikely, consideration for surgery and/or chemo/radiation. Daughter completely agrees that patient not a candidate for surgical intervention/chemo/radiation. She would consider feeding tubemore for palliative intent given the fact that patient likely will obstruct soonthis will allow for medications to be given for comfort as well as potential tube feedings. Discussed the support of hospice given focuses more patient's comfort. [facility] is willing to talk with hospice as well. We will place a consult to hospice for evaluation and potential admit. 3. Goals of care mainly focusing on comfort. Consideration for feeding tube/PEG will allow for medication management as well as tube feedings given the fact that patient has near obstructing lesion at the GE junction. Discussed with Meron Merritt and typically GI would place a PEG with/during EGD, we will ask IR to see her for percutaneous placement. Hospice consult placed as well. 4. Advance care planning no advanced medical directive in the chart. Patient's daughter is legal next of kin and only child and hence would serve as medical POA. 5. CODE STATUS reviewed resuscitation and intubation with patient's daughter. Discussed limitations of these modalities and the burden at this could place on patient given her advanced cancer. After this discussion, her daughter agrees on no attempts at resuscitation or intubation.   We will need a durable DO NOT RESUSCITATE form completed prior to discharge  6. Symptom managementcurrently patient not experiencing any pain or other symptoms  7. Psychosocialdifficult situation. Daughter also thinking about placement for patient. She seems to think patient has Medicaid which would cover room and board patient patient went to the facility with hospice. In reviewing the chart, patient appears to have Medicare only. We will continue to review and assess ongoing needs  8. Discussed with bedside nurse, COREY Gant  9. Initial consult note routed to primary continuity provider and/or primary health care team members  10. Communicated plan of care with: Palliative IDT, [facility] Team     GOALS OF CARE / TREATMENT PREFERENCES:     GOALS OF CARE:  Patient/Health Care Proxy Stated Goals: Other (comment)(Likely home with hospice)    TREATMENT PREFERENCES:   Code Status: DNR    Advance Care Planning:  [x] [facility] Interdisciplinary Team has updated the ACP Navigator with [place] and Patient Capacity      No flowsheet data found. Medical Interventions: Limited additional interventions     Other Instructions:   Artificially Administered Nutrition: Feeding tube for a defined trial period     Other:    As far as possible, the palliative care team has discussed with patient / health care proxy about goals of care / treatment preferences for patient. HISTORY:     History obtained from: Chart, patient's daughter, bedside nurse    CHIEF COMPLAINT: Cannot swallow    HPI/SUBJECTIVE:    The patient is:   [x] Verbal and participatory  [] Non-participatory due to:   Patient is awake. Pleasantly confused.   Denies any pain    Clinical Pain Assessment (nonverbal scale for severity on nonverbal patients):   Clinical Pain Assessment  Severity: 0     Activity (Movement): Lying quietly, normal position    Duration: for how long has pt been experiencing pain (e.g., 2 days, 1 month, years)  Frequency: how often pain is an issue (e.g., several times per day, once every few days, constant)     FUNCTIONAL ASSESSMENT:     Palliative Performance Scale (PPS):  PPS: 50       PSYCHOSOCIAL/SPIRITUAL SCREENING:     Palliative IDT has assessed this patient for cultural preferences / practices and a referral made as appropriate to needs (Cultural Services, Patient Advocacy, Ethics, etc.)    Any spiritual / Oriental orthodox concerns:  [] Yes /  [x] No    Caregiver Burnout:  [] Yes /  [x] No /  [] No Caregiver Present      Anticipatory grief assessment:   [x] Normal  / [] Maladaptive       ESAS Anxiety: Anxiety: 0    ESAS Depression: Depression: 0        REVIEW OF SYSTEMS:     Positive and pertinent negative findings in ROS are noted above in HPI. The following systems were [x] reviewed / [] unable to be reviewed as noted in HPI  Other findings are noted below. Systems: constitutional, ears/nose/mouth/throat, respiratory, gastrointestinal, genitourinary, musculoskeletal, integumentary, neurologic, psychiatric, endocrine. Positive findings noted below. Modified ESAS Completed by: provider   Fatigue: 4 Drowsiness: 1   Depression: 0 Pain: 0   Anxiety: 0 Nausea: 3   Anorexia: 4 Dyspnea: 0     Constipation: No              PHYSICAL EXAM:     From RN flowsheet:  Wt Readings from Last 3 Encounters:   06/22/20 83 lb (37.6 kg)   12/05/19 96 lb (43.5 kg)   12/02/19 96 lb 9 oz (43.8 kg)     Blood pressure 149/77, pulse 75, temperature 99 °F (37.2 °C), resp. rate 17, height 5' 3\" (1.6 m), weight 83 lb (37.6 kg), SpO2 97 %.     Pain Scale 1: Numeric (0 - 10)  Pain Intensity 1: 0                 Last bowel movement, if known:     Constitutional: Very thin, alert to person, was able to tell me her daughter's name  Eyes: pupils equal, anicteric  ENMT: no nasal discharge, moist mucous membranes  Cardiovascular: regular rhythm, distal pulses intact  Respiratory: breathing not labored, symmetric  Gastrointestinal: soft non-tender, +bowel sounds  Musculoskeletal: no deformity, no tenderness to palpation  Skin: warm, dry  Neurologic: following commands, moving all extremities  Psychiatric: full affect, no hallucinations  Other:       HISTORY:     Active Problems:    Dysphagia (2020)      FTT (failure to thrive) in adult (2020)      Tumor of esophagus (2020)      Past Medical History:   Diagnosis Date    Anemia     Depression     Diabetes ([facility] [address]     Hypercholesterolemia     Hypertension     Stroke ([facility] 2012    CVA; left sided weakness      History reviewed. No pertinent surgical history. Family History   Problem Relation Age of Onset    No Known Problems Mother     No Known Problems Father       History reviewed, no pertinent family history.   Social History     Tobacco Use    Smoking status: Former Smoker     Packs/day: 0.25     Last attempt to quit: 2012     Years since quittin.4    Smokeless tobacco: Never Used   Substance Use Topics    Alcohol use: No     Alcohol/week: 0.0 standard drinks     No Known Allergies   Current Facility-Administered Medications   Medication Dose Route Frequency    sodium chloride (NS) flush 5-40 mL  5-40 mL IntraVENous Q8H    sodium chloride (NS) flush 5-40 mL  5-40 mL IntraVENous PRN    insulin lispro (HUMALOG) injection   SubCUTAneous Q6H    glucose chewable tablet 16 g  4 Tab Oral PRN    glucagon (GLUCAGEN) injection 1 mg  1 mg IntraMUSCular PRN    dextrose 10% infusion 0-250 mL  0-250 mL IntraVENous PRN          LAB AND IMAGING FINDINGS:     Lab Results   Component Value Date/Time    WBC 6.8 2020 02:39 AM    HGB 9.9 (L) 2020 02:39 AM    PLATELET 610 00/15/9768 02:39 AM     Lab Results   Component Value Date/Time    Sodium 136 2020 02:39 AM    Potassium 3.5 2020 02:39 AM    Chloride 105 2020 02:39 AM    CO2 24 2020 02:39 AM    BUN 10 2020 02:39 AM    Creatinine 0.55 2020 02:39 AM    Calcium 9.2 2020 02:39 AM    Magnesium 1.4 (L) 2020 02:39 AM Phosphorus 2.9 06/23/2020 02:39 AM      Lab Results   Component Value Date/Time    Alk. phosphatase 58 06/23/2020 02:39 AM    Protein, total 6.7 06/23/2020 02:39 AM    Albumin 2.6 (L) 06/23/2020 02:39 AM    Globulin 4.1 (H) 06/23/2020 02:39 AM     No results found for: INR, PTMR, PTP, PT1, PT2, APTT, INREXT, INREXT   No results found for: IRON, FE, TIBC, IBCT, PSAT, FERR   No results found for: PH, PCO2, PO2  No components found for: [place]   Lab Results   Component Value Date/Time     (H) 10/28/2012 12:30 PM    CK - MB 4.9 (H) 10/28/2012 12:30 PM                Total time: 70  Counseling / coordination time, spent as noted above: 65  > 50% counseling / coordination?: yes    Prolonged service was provided for  []30 min   []75 min in face to face time in the presence of the patient, spent as noted above. Time Start:   Time End:   Note: this can only be billed with 16717 (initial) or 86348 (follow up). If multiple start / stop times, list each separately.

## 2020-06-23 NOTE — PROGRESS NOTES
NUTRITION COMPLETE ASSESSMENT    RECOMMENDATIONS:   1. At very high risk for refeeding syndrome (even with D5 infusion):   - Consider MVI goody bag (vs oral thiamine if tolerating pills)   - Monitor K+, Mg, Phos with repletion PRN    2. Diet per MD pending goals - add Magic Cup and Boost Pudding once on fulls liquids    3. Consult if tube feed recommendations needed    Please document  Severe Chronic Protein Calorie Malnutrition in patient diagnoses. Patient meets criteria for as evidenced by:   ASPEN Malnutrition Criteria  Acute Illness, Chronic Illness, or Social/Enviornmental: Chronic illness  Energy Intake: Less than/equal to 75% of est energy req for greater than/equal to 1 month  Weight Loss: Greater than 10% x 6 mos  Body Fat Loss: Severe  Muscle Mass Loss: Severe  ASPEN Malnutrition Score - Chronic Illness: 24  Chronic Illness - Malnutrition Diagnosis: Severe malnutrition. Interventions/Plan:   Food/Nutrient Delivery:    Commercial supplement(once diet advanced per MD)          Assessment:   Reason for Assessment: MST    Diet: NPO   Supplements: None  Nutritionally Significant Medications: [x] Reviewed & Includes: SSI, D5 NaCl @ 125ml/hr    Pre-Hospitalization:  Usual Appetite: Poor  Diet at Home: liquid/soft    Current Hospitalization:   Appetite:    PO Ability: With assist Average po intake:NPO  Average supplements intake:        Subjective: Staff Interviewed, Pt visited in room  \"I have lost a lot of weight. I do ok with liquids. \"    Objective:  Pt admitted for dysphagia. PMHx: depression, DM, anemia, HTN, stroke, dementia. Lethargy and weakness with difficulty swallowing prior to admit. Barium swallow on 6/22 showing mass of the distal esophagus. CT showing pleural effusion with question of mets along with splenic lesion, pancreatic lesion and right thyroid nodule.  Plans for Palliative Consult today to discuss goals of care, GI following for possible EGD for biopsy and feeding tube pending discussion. D5 providing 3000ml fluid, 150g CHO. Puts pt at risk for refeeding syndrome with that level of CHO with pt meeting high risk criteria. Will check Mg and Phos as add-on. Montior and replete per provide as needed. Consider MVI goody bag (vs oral thiamine if tolerating pills). Severe wt loss of 40# over past 6 months with difficulty swallowing solids. Pt visited today. Reports tolerating liquids and yogurt texture foods at home ok but with dementia not a reliable historian. Daughter reporting pt holding foods in mouth and intolerance of Ensure in GI notes. Currently NPO pending plan per MD. If diet advanced would recommend trial of Magic Cup (290kcal, 9g protein each) and Boost Pudding (240kcal, 7g protein each). Would question benefit of feeding tube if swallowing issues also thought to be related to dementia (pt holding foods in mouth, etc) vs esophageal mass. Feeding tube not generally recommended in patients with dementia. Will follow for goals of care with tube feed recommendations PRN. Only 72% IBW. Nutrition Focused Physical Exam:  Muscle Loss: [place] Muscle Wasting-Severe  Clav[place] Region Muscle Wasting-Severe  Fat Loss: [place] Fat Loss-Severe  Che[place] Region (Buccal Fat Pads) Fat Loss-Severe  Estimated Nutrition Needs:   Kcals/day: 5043 Kcals/day(1444-1520kcal)  Protein: 53 g(53-60g (1.4-1.6g/kg))  Fluid: 1500 ml(1ml/kcal)  Based On: Kcal/kg - specify (Comment)(38-40kcal/kg)  Weight Used: Actual wt(38kg)    Pt expected to meet estimated nutrient needs:  []   Yes     [x]  No [] Unable to predict at this time  Nutrition Diagnosis:   1.  Malnutrition related to inadequate oral intake 2/2 dementia/esophageal mass as evidenced by severe wt loss of  33% x 5 months; muscle/fat wasting; <50% needs prior to admit    Goals:     Discussion regarding goals of care; diet for comfort vs EN in 2-3 days     Monitoring & Evaluation:    - Total energy intake, Liquid meal replacement, Protein intake   - Weight/weight change, Electrolyte and renal profile, GI, Head and neck   -      Previous Nutrition Goals Met:   N/A  Previous Recommendations:    N/A    Education & Discharge Needs:   [x] None Identified   [] Identified and addressed    [x] Participated in care plan, discharge planning, and/or interdisciplinary rounds        Nutrition Discharge Plan:   [x] Too soon to determine  [] Other:        Cultural, Scientologist and ethnic food preferences identified: None    Skin Integrity: [x]Intact  []Other  Edema: []None []Other  Last BM: pta  Food Allergies: [x]None []Other  Diet Restrictions: Food Intolerance: Ensure  Cultural/Rastafarian Preference(s): None     Anthropometrics:    Weight Loss Metrics 6/22/2020 6/2/2020 12/5/2019 12/2/2019 9/3/2019 5/31/2019 11/13/2018   Today's Wt 83 lb - 96 lb 96 lb 9 oz 100 lb 3.2 oz 100 lb 3.2 oz 100 lb   BMI 14.7 kg/m2 17.56 kg/m2 17.56 kg/m2 17.66 kg/m2 18.33 kg/m2 18.33 kg/m2 18.29 kg/m2      Weight Source: Patient stated  Height: 5' 3\" (160 cm),    Body mass index is 14.7 kg/m².      IBW : 52.2 kg (115 lb), % IBW (Calculated): 72.17 %   ,      Labs:    Lab Results   Component Value Date/Time    Sodium 136 06/23/2020 02:39 AM    Potassium 3.5 06/23/2020 02:39 AM    Chloride 105 06/23/2020 02:39 AM    CO2 24 06/23/2020 02:39 AM    Glucose 130 (H) 06/23/2020 02:39 AM    BUN 10 06/23/2020 02:39 AM    Creatinine 0.55 06/23/2020 02:39 AM    Calcium 9.2 06/23/2020 02:39 AM    Magnesium 1.7 12/02/2019 06:03 AM    Albumin 2.6 (L) 06/23/2020 02:39 AM     Lab Results   Component Value Date/Time    Hemoglobin A1c 7.5 (H) 05/06/2020 09:09 AM     Kyle Newby RD [facility], Pager #[phone] or via [employer]

## 2020-06-23 NOTE — PROGRESS NOTES
[facility] Adult  Hospitalist Group                                                                                          Hospitalist Progress Note  Emani Mukherjee MD  Answering service: [phone] OR [phone] from in house phone        Date of Service:  2020  NAME:  Nidia Francis  :  1931  MRN:  [id number]    This documentation was facilitated by a Voice Recognition software and may contain inadvertent typographical errors. Admission Summary: This is an 80-year-old female with a significant history of dementia, diabetes, hypertension, hyper lipidemia, stroke, depression and anemia was brought to the ED by her daughter for failure to thrive. Patient has dysphagia to solid food tolerating only liquids. She reportedly had a negative endoscopy few months back. Interval history / Subjective: Follow-up for dysphagia, esophageal stricture due to tumor likely cancerous   . Ms. Kaelyn Callahan remains pleasantly confused. Sitter in the room. . I am informing by palliative MD the daughter wanted DNR and hospice referral but may opt for PEG placement. Assessment & Plan:     Dysphagia due to distal esophageal stricture due to tumor   FTT. Severe protein caloric malnutrition   Body mass index is 14.7 kg/m². Dehydration. hyponatremia resolved. -D5NS 125 ml/hr  -GI consulted, further plans pending palliative conversation with daughter. Advanced dementia with FTT  -Reorient. Fall precaution  -On Remeron     Hypertension. BP low normal.Held all antihypertensives. DM  -Accucheks. Humalog high sensitivity SS. Hold metformin     Patient's Baseline: Ambulates with walking  DVT ppx: scd  Code status:  Now DNR.   Disposition: TBD     Hospital Problems  Date Reviewed: 2020          Codes Class Noted POA    Dysphagia ICD-10-CM: R13.10  ICD-9-CM: 787.20  2020 Unknown        FTT (failure to thrive) in adult ICD-10-CM: R62.7  ICD-9-CM: 783.7  2020 Unknown        Tumor of esophagus ICD-10-CM: D49.0  ICD-9-CM: 239.0  6/22/2020 Unknown                Review of Systems:   Review of systems not obtained due to patient factors. Vital Signs:    Last 24hrs VS reviewed since prior progress note. Most recent are:  Visit Vitals  /77 (BP 1 Location: Left arm, BP Patient Position: At rest)   Pulse 75   Temp 99 °F (37.2 °C)   Resp 17   Ht 5' 3\" (1.6 m)   Wt 37.6 kg (83 lb)   SpO2 97%   BMI 14.70 kg/m²       No intake or output data in the 24 hours ending 06/23/20 1503     Physical Examination:             Constitutional:  Alert. Pleasantly confused. HEENT:  Atraumatic. Oral mucosa moist,. Non icteric sclera. No pallor. Resp:  CTA bilaterally. No wheezing/rhonchi/rales. No accessory muscle use   Chest Wall: No deformity   CV:  Regular rhythm, normal rate, no murmurs, gallops, rubs    GI:  Soft, non distended, non tender. normoactive bowel sounds, no hepatosplenomegaly    :  No CVA or suprapubic tenderness    Musculoskeletal:  No edema, warm, 2+ pulses throughout    Neurologic:  Mental status: Alert. Pleasantly confused. Cranial nerves II-XII : WNL  Motor exam:Moves all extremities symmetrically              Data Review:    Review and/or order of clinical lab test  Review and/or order of tests in the radiology section of CPT  Review and/or order of tests in the medicine section of CPT      Labs:     Recent Labs     06/23/20  0239 06/22/20  1051   WBC 6.8 7.5   HGB 9.9* 11.1*   HCT 30.1* 35.0    430*     Recent Labs     06/23/20  0239 06/22/20  1051    134*   K 3.5 4.3    103   CO2 24 23   BUN 10 14   CREA 0.55 0.80   * 149*   CA 9.2 9.7   MG 1.4*  --    PHOS 2.9  --      Recent Labs     06/23/20  0239 06/22/20  1051   ALT 10* 11*   AP 58 64   TBILI 0.4 0.5   TP 6.7 7.6   ALB 2.6* 3.0*   GLOB 4.1* 4.6*     No results for input(s): INR, PTP, APTT, INREXT in the last 72 hours. No results for input(s): FE, TIBC, PSAT, FERR in the last 72 hours.    Lab Results   Component Value Date/Time    Folate 3.8 05/06/2020 09:09 AM      No results for input(s): PH, PCO2, PO2 in the last 72 hours.   Recent Labs     06/22/20  1051   TROIQ <0.05     Lab Results   Component Value Date/Time    Cholesterol, total 116 05/06/2020 09:09 AM    HDL Cholesterol 43 05/06/2020 09:09 AM    LDL, calculated 56 05/06/2020 09:09 AM    Triglyceride 84 05/06/2020 09:09 AM    CHOL/HDL Ratio 3.6 09/19/2012 03:30 AM     Lab Results   Component Value Date/Time    Glucose (POC) 128 (H) 06/23/2020 11:21 AM    Glucose (POC) 160 (H) 06/23/2020 06:31 AM    Glucose (POC) 99 06/22/2020 11:45 PM    Glucose (POC) 112 (H) 06/22/2020 10:10 PM    Glucose (POC) 132 (H) 06/22/2020 07:04 PM     Lab Results   Component Value Date/Time    Color YELLOW/STRAW 12/02/2019 06:03 AM    Appearance CLOUDY (A) 12/02/2019 06:03 AM    Specific gravity 1.017 12/02/2019 06:03 AM    pH (UA) 5.5 12/02/2019 06:03 AM    Protein 30 (A) 12/02/2019 06:03 AM    Glucose NEGATIVE  12/02/2019 06:03 AM    Ketone NEGATIVE  12/02/2019 06:03 AM    Bilirubin NEGATIVE  12/02/2019 06:03 AM    Urobilinogen 1.0 12/02/2019 06:03 AM    Nitrites NEGATIVE  12/02/2019 06:03 AM    Leukocyte Esterase SMALL (A) 12/02/2019 06:03 AM    Epithelial cells FEW 12/02/2019 06:03 AM    Bacteria NEGATIVE  12/02/2019 06:03 AM    WBC 0-4 12/02/2019 06:03 AM    RBC 0-5 12/02/2019 06:03 AM         Medications Reviewed:     Current Facility-Administered Medications   Medication Dose Route Frequency    sodium chloride (NS) flush 5-40 mL  5-40 mL IntraVENous Q8H    sodium chloride (NS) flush 5-40 mL  5-40 mL IntraVENous PRN    dextrose 5% and 0.9% NaCl infusion  125 mL/hr IntraVENous CONTINUOUS    insulin lispro (HUMALOG) injection   SubCUTAneous Q6H    glucose chewable tablet 16 g  4 Tab Oral PRN    glucagon (GLUCAGEN) injection 1 mg  1 mg IntraMUSCular PRN    dextrose 10% infusion 0-250 mL  0-250 mL IntraVENous PRN ______________________________________________________________________  EXPECTED LENGTH OF STAY: - - -  ACTUAL LENGTH OF STAY:          1                 Salazar Roy MD

## 2020-06-24 ENCOUNTER — ANESTHESIA (OUTPATIENT)
Dept: ENDOSCOPY | Age: 85
DRG: 374 | End: 2020-06-24
Payer: MEDICARE

## 2020-06-24 ENCOUNTER — ANESTHESIA EVENT (OUTPATIENT)
Dept: ENDOSCOPY | Age: 85
DRG: 374 | End: 2020-06-24
Payer: MEDICARE

## 2020-06-24 LAB
GLUCOSE BLD STRIP.AUTO-MCNC: 151 MG/DL (ref 65–100)
GLUCOSE BLD STRIP.AUTO-MCNC: 66 MG/DL (ref 65–100)
GLUCOSE BLD STRIP.AUTO-MCNC: 78 MG/DL (ref 65–100)
GLUCOSE BLD STRIP.AUTO-MCNC: 84 MG/DL (ref 65–100)
GLUCOSE BLD STRIP.AUTO-MCNC: 87 MG/DL (ref 65–100)
SERVICE CMNT-IMP: ABNORMAL
SERVICE CMNT-IMP: NORMAL

## 2020-06-24 PROCEDURE — 74011000258 HC RX REV CODE- 258: Performed by: HOSPITALIST

## 2020-06-24 PROCEDURE — 0DB58ZX EXCISION OF ESOPHAGUS, VIA NATURAL OR ARTIFICIAL OPENING ENDOSCOPIC, DIAGNOSTIC: ICD-10-PCS | Performed by: INTERNAL MEDICINE

## 2020-06-24 PROCEDURE — 76040000019: Performed by: INTERNAL MEDICINE

## 2020-06-24 PROCEDURE — 77030021593 HC FCPS BIOP ENDOSC BSC -A: Performed by: INTERNAL MEDICINE

## 2020-06-24 PROCEDURE — 76060000031 HC ANESTHESIA FIRST 0.5 HR: Performed by: INTERNAL MEDICINE

## 2020-06-24 PROCEDURE — 74011000250 HC RX REV CODE- 250: Performed by: NURSE ANESTHETIST, CERTIFIED REGISTERED

## 2020-06-24 PROCEDURE — 74011250636 HC RX REV CODE- 250/636: Performed by: INTERNAL MEDICINE

## 2020-06-24 PROCEDURE — 65270000032 HC RM SEMIPRIVATE

## 2020-06-24 PROCEDURE — 74011250636 HC RX REV CODE- 250/636: Performed by: NURSE ANESTHETIST, CERTIFIED REGISTERED

## 2020-06-24 PROCEDURE — 88305 TISSUE EXAM BY PATHOLOGIST: CPT

## 2020-06-24 PROCEDURE — 82962 GLUCOSE BLOOD TEST: CPT

## 2020-06-24 RX ORDER — SODIUM CHLORIDE 9 MG/ML
50 INJECTION, SOLUTION INTRAVENOUS CONTINUOUS
Status: CANCELLED | OUTPATIENT
Start: 2020-06-24 | End: 2020-06-24

## 2020-06-24 RX ORDER — ATROPINE SULFATE 0.1 MG/ML
0.5 INJECTION INTRAVENOUS
Status: CANCELLED | OUTPATIENT
Start: 2020-06-24 | End: 2020-06-25

## 2020-06-24 RX ORDER — NALOXONE HYDROCHLORIDE 0.4 MG/ML
0.4 INJECTION, SOLUTION INTRAMUSCULAR; INTRAVENOUS; SUBCUTANEOUS
Status: CANCELLED | OUTPATIENT
Start: 2020-06-24 | End: 2020-06-24

## 2020-06-24 RX ORDER — NALOXONE HYDROCHLORIDE 0.4 MG/ML
0.4 INJECTION, SOLUTION INTRAMUSCULAR; INTRAVENOUS; SUBCUTANEOUS
Status: DISCONTINUED | OUTPATIENT
Start: 2020-06-24 | End: 2020-06-24 | Stop reason: HOSPADM

## 2020-06-24 RX ORDER — SODIUM CHLORIDE 9 MG/ML
50 INJECTION, SOLUTION INTRAVENOUS CONTINUOUS
Status: DISPENSED | OUTPATIENT
Start: 2020-06-24 | End: 2020-06-24

## 2020-06-24 RX ORDER — SODIUM CHLORIDE 0.9 % (FLUSH) 0.9 %
5-40 SYRINGE (ML) INJECTION EVERY 8 HOURS
Status: DISCONTINUED | OUTPATIENT
Start: 2020-06-24 | End: 2020-07-02 | Stop reason: HOSPADM

## 2020-06-24 RX ORDER — SODIUM CHLORIDE 0.9 % (FLUSH) 0.9 %
5-40 SYRINGE (ML) INJECTION AS NEEDED
Status: DISCONTINUED | OUTPATIENT
Start: 2020-06-24 | End: 2020-07-02 | Stop reason: HOSPADM

## 2020-06-24 RX ORDER — EPINEPHRINE 0.1 MG/ML
1 INJECTION INTRACARDIAC; INTRAVENOUS
Status: CANCELLED | OUTPATIENT
Start: 2020-06-24 | End: 2020-06-25

## 2020-06-24 RX ORDER — SODIUM CHLORIDE 0.9 % (FLUSH) 0.9 %
5-40 SYRINGE (ML) INJECTION AS NEEDED
Status: CANCELLED | OUTPATIENT
Start: 2020-06-24

## 2020-06-24 RX ORDER — FLUMAZENIL 0.1 MG/ML
0.2 INJECTION INTRAVENOUS
Status: DISCONTINUED | OUTPATIENT
Start: 2020-06-24 | End: 2020-06-24 | Stop reason: HOSPADM

## 2020-06-24 RX ORDER — EPINEPHRINE 0.1 MG/ML
1 INJECTION INTRACARDIAC; INTRAVENOUS
Status: DISCONTINUED | OUTPATIENT
Start: 2020-06-24 | End: 2020-06-24 | Stop reason: HOSPADM

## 2020-06-24 RX ORDER — LEVOFLOXACIN 5 MG/ML
500 INJECTION, SOLUTION INTRAVENOUS ONCE
Status: DISCONTINUED | OUTPATIENT
Start: 2020-06-24 | End: 2020-06-24

## 2020-06-24 RX ORDER — ATROPINE SULFATE 0.1 MG/ML
0.5 INJECTION INTRAVENOUS
Status: DISCONTINUED | OUTPATIENT
Start: 2020-06-24 | End: 2020-06-24 | Stop reason: HOSPADM

## 2020-06-24 RX ORDER — SODIUM CHLORIDE 0.9 % (FLUSH) 0.9 %
5-40 SYRINGE (ML) INJECTION EVERY 8 HOURS
Status: CANCELLED | OUTPATIENT
Start: 2020-06-24

## 2020-06-24 RX ORDER — LIDOCAINE HYDROCHLORIDE 20 MG/ML
INJECTION, SOLUTION EPIDURAL; INFILTRATION; INTRACAUDAL; PERINEURAL AS NEEDED
Status: DISCONTINUED | OUTPATIENT
Start: 2020-06-24 | End: 2020-06-24 | Stop reason: HOSPADM

## 2020-06-24 RX ORDER — PROPOFOL 10 MG/ML
INJECTION, EMULSION INTRAVENOUS AS NEEDED
Status: DISCONTINUED | OUTPATIENT
Start: 2020-06-24 | End: 2020-06-24 | Stop reason: HOSPADM

## 2020-06-24 RX ORDER — FLUMAZENIL 0.1 MG/ML
0.2 INJECTION INTRAVENOUS
Status: CANCELLED | OUTPATIENT
Start: 2020-06-24 | End: 2020-06-24

## 2020-06-24 RX ORDER — DEXTROMETHORPHAN/PSEUDOEPHED 2.5-7.5/.8
1.2 DROPS ORAL
Status: DISCONTINUED | OUTPATIENT
Start: 2020-06-24 | End: 2020-06-24 | Stop reason: HOSPADM

## 2020-06-24 RX ORDER — SODIUM CHLORIDE 9 MG/ML
INJECTION, SOLUTION INTRAVENOUS
Status: DISCONTINUED | OUTPATIENT
Start: 2020-06-24 | End: 2020-06-24 | Stop reason: HOSPADM

## 2020-06-24 RX ORDER — DEXTROMETHORPHAN/PSEUDOEPHED 2.5-7.5/.8
1.2 DROPS ORAL
Status: CANCELLED | OUTPATIENT
Start: 2020-06-24

## 2020-06-24 RX ADMIN — PROPOFOL 20 MG: 10 INJECTION, EMULSION INTRAVENOUS at 16:00

## 2020-06-24 RX ADMIN — PROPOFOL 30 MG: 10 INJECTION, EMULSION INTRAVENOUS at 15:56

## 2020-06-24 RX ADMIN — Medication 10 ML: at 06:00

## 2020-06-24 RX ADMIN — Medication 10 ML: at 20:55

## 2020-06-24 RX ADMIN — DEXTROSE MONOHYDRATE 125 ML: 100 INJECTION, SOLUTION INTRAVENOUS at 17:52

## 2020-06-24 RX ADMIN — LIDOCAINE HYDROCHLORIDE 20 MG: 20 INJECTION, SOLUTION EPIDURAL; INFILTRATION; INTRACAUDAL; PERINEURAL at 15:56

## 2020-06-24 RX ADMIN — SODIUM CHLORIDE: 900 INJECTION, SOLUTION INTRAVENOUS at 15:45

## 2020-06-24 RX ADMIN — PROPOFOL 20 MG: 10 INJECTION, EMULSION INTRAVENOUS at 16:03

## 2020-06-24 NOTE — ANESTHESIA PREPROCEDURE EVALUATION
Relevant Problems   No relevant active problems       Anesthetic History   No history of anesthetic complications            Review of Systems / Medical History  Patient summary reviewed, nursing notes reviewed and pertinent labs reviewed    Pulmonary  Within defined limits                 Neuro/Psych       CVA  Psychiatric history and dementia     Cardiovascular    Hypertension          Hyperlipidemia    Exercise tolerance: >4 METS     GI/Hepatic/Renal  Within defined limits              Endo/Other    Diabetes         Other Findings   Comments: Oriented only to person; consent obtained from daughter; history from chart; dysphagia and weight loss due to esophageal tumor          Physical Exam    Airway  Mallampati: I  TM Distance: < 4 cm  Neck ROM: normal range of motion   Mouth opening: Normal     Cardiovascular  Regular rate and rhythm,  S1 and S2 normal,  no murmur, click, rub, or gallop             Dental    Dentition: Edentulous     Pulmonary  Breath sounds clear to auscultation               Abdominal  Abdominal exam normal       Other Findings            Anesthetic Plan    ASA: 3  Anesthesia type: MAC          Induction: Intravenous  Anesthetic plan and risks discussed with: Patient

## 2020-06-24 NOTE — PERIOP NOTES
TRANSFER - IN REPORT:    Verbal report received from [facility] JOÃO RN(name) on [address] [facility]  being received from ([facility]) for ordered procedure      Report consisted of patients Situation, Background, Assessment and   Recommendations(SBAR). Information from the following report(s) SBAR was reviewed with the receiving nurse. Opportunity for questions and clarification was provided. Assessment completed upon patients arrival to unit and care assumed. TRANSFER - OUT REPORT:    Verbal report given to [facility] JOÃO RN(name) on [address] [facility]  being transferred to ([facility]) for routine post - op       Report consisted of patients Situation, Background, Assessment and   Recommendations(SBAR). Information from the following report(s) Procedure Summary was reviewed with the receiving nurse. Lines:   Peripheral IV 06/24/20 Left; Lower Forearm (Active)   Site Assessment Clean;Dry 6/24/2020  2:50 PM   Phlebitis Assessment 0 6/24/2020  2:50 PM   Infiltration Assessment 0 6/24/2020  2:50 PM   Dressing Status New;Occlusive 6/24/2020  2:50 PM   Dressing Type Transparent 6/24/2020  2:50 PM   Hub Color/Line Status Blue 6/24/2020  2:50 PM   Alcohol Cap Used Yes 6/24/2020  2:50 PM        Opportunity for questions and clarification was provided. Patient transported with:   Tech    Initial RN admission and assessment performed and documented in Endoscopy navigator. Patient evaluated by anesthesia in pre-procedure holding. All procedural vital signs, airway assessment, and level of consciousness information monitored and recorded by anesthesia staff on the anesthesia record. Report received from CRNA post procedure. Patient transported to recovery area by RN. Endoscope was pre-cleaned at bedside immediately following procedure by Jorge Ward.

## 2020-06-24 NOTE — ANESTHESIA POSTPROCEDURE EVALUATION
Post-Anesthesia Evaluation and Assessment    Patient: Janel Jacobo MRN: [id number]  SSN: [id number]    YOB: 1931  Age: 80 y.o. Sex: female      I have evaluated the patient and they are stable and ready for discharge from the PACU. Cardiovascular Function/Vital Signs  Visit Vitals  BP (!) 148/115   Pulse (!) 109   Temp 36.7 °C (98.1 °F)   Resp (!) 33   Ht 5' 3\" (1.6 m)   Wt 37.6 kg (83 lb)   SpO2 96%   BMI 14.70 kg/m²       Patient is status post MAC anesthesia for Procedure(s):  ESOPHAGOGASTRODUODENOSCOPY (EGD) : -  ESOPHAGOGASTRODUODENAL (EGD) BIOPSY. Nausea/Vomiting: None    Postoperative hydration reviewed and adequate. Pain:  Pain Scale 1: Numeric (0 - 10) (06/24/20 1617)  Pain Intensity 1: 0 (06/24/20 1617)   Managed    Neurological Status:   Neuro  Neurologic State: Alert;Confused (06/24/20 1500)  Orientation Level: Oriented to person;Disoriented to situation;Disoriented to place; Disoriented to time (06/24/20 1500)  Cognition: Follows commands;Poor safety awareness;Decreased attention/concentration (06/24/20 1500)  Speech: Clear (06/24/20 1500)  Size L Pupil (mm): 3 (06/22/20 1300)  Size R Pupil (mm): 3 (06/22/20 1300)   At baseline    Mental Status, Level of Consciousness: Alert and  oriented to person, place, and time    Pulmonary Status:   O2 Device: Room air (06/24/20 1617)   Adequate oxygenation and airway patent    Complications related to anesthesia: None    Post-anesthesia assessment completed. No concerns    Signed By: Ceci Zavala MD     June 24, 2020              Procedure(s):  ESOPHAGOGASTRODUODENOSCOPY (EGD) : -  ESOPHAGOGASTRODUODENAL (EGD) BIOPSY. MAC    <BSHSIANPOST>    INITIAL Post-op Vital signs:   Vitals Value Taken Time   /115 6/24/2020  4:17 PM   Temp 36.7 °C (98.1 °F) 6/24/2020  4:17 PM   Pulse 144 6/24/2020  4:23 PM   Resp 32 6/24/2020  4:23 PM   SpO2 95 % 6/24/2020  4:23 PM   Vitals shown include unvalidated device data.

## 2020-06-24 NOTE — PROGRESS NOTES
Bedside shift change report given to Jorge Patel RN (oncoming nurse) by Gal Gomez RN (offgoing nurse). Report included the following information SBAR, Kardex, Intake/Output and MAR.

## 2020-06-24 NOTE — PROGRESS NOTES
118 [facility] [place].  217 [facility] [address], [address]  [phone]                GI PROGRESS NOTE      NAME:   Crys Wellton   :    1931   MRN:    [id number]     Assessment/Plan   Dysphagia/ Concern of Esophageal cancer  - BA swallow 20:  In the distal esophagus, a circumferential mass extends over approximately 20 mm. This causes tight, persistent stenosis of the distal esophageal lumen, with  prolonged delay of oral contrast progression across the tumor and into the stomach. Esophagoesophageal reflux is associated. Smooth, lobulated projections of the tumor indent the superior-most stomach laterally. At the least, this is extrinsic mass effect. Direct invasion is likely. Inclusive of this, tumor extends over nearly 40 mm. -  CT chest, abdomen, pelvis 20:     1. Distal esophageal mass extending into the adjacent soft tissues and partially  narrowing the left inferior pulmonary vein     2. Left pleural effusion with question pleural-based metastasis     3. 9 mm nodule in the right upper lobe. It is partially calcified. 4. Indeterminate splenic lesion measuring 3.5 cm     5. 3.3 cm infrarenal abdominal aortic aneurysm with occlusion of the right  common iliac artery, internal iliac artery and external iliac artery with  reconstitution of the right common femoral artery by collaterals     6. 2.7 cm cystic pancreatic lesion     7. 3 cm right thyroid nodule     - Reviewed chart - Dr. Clari Gonsales had discussed the possibility of EGD with biopsy and possible feeding tube. Daughter decided on EGD with PEG placement after speaking with hospitalist and GI  - Palliative care consulted: options of PEG tube with IR vs.may need esophageal stent vs PEG tube depending.    - Daughter is Dave Eisenmenger - [phone], spoke with daughter today, she decided on EGD with PEG placement.  Again discussed all options.      Failure to thrive  Weight loss, unintentional  Anorexia  Alzheimer's  Will discuss with Dr. 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  female Patient laying in bed, pleasantly confused    Review of Systems          Objective:     VITALS:   Last 24hrs VS reviewed since prior hospitalist progress note. Most recent are:  Visit Vitals  /66   Pulse 85   Temp 98.2 °F (36.8 °C)   Resp 17   Ht 5' 3\" (1.6 m)   Wt 37.6 kg (83 lb)   SpO2 95%   BMI 14.70 kg/m²       Intake/Output Summary (Last 24 hours) at 6/24/2020 1322  Last data filed at 6/24/2020 5639  Gross per 24 hour   Intake    Output 200 ml   Net -200 ml        PHYSICAL EXAM:  General appearance: cooperative, no distress, appears stated age. Frail appearance  Skin: Extremities and face reveal no rashes or jaundice  HEENT: Sclerae anicteric. Extra-occular muscles are intact. Cardiovascular: Regular rate and rhythm. Respiratory: Clear breath sounds with no wheezes, rales, or rhonchi. GI: Abdomen nondistended, soft, and nontender. Normal active bowel sounds. No masses palpable. Rectal: Deferred   Musculoskeletal: No edema of the lower legs.    Neurological: Patient is alert. Psychiatric: Mood appears appropriate, very pleasant. Confused  No anxiety or agitation.        Lab Data   Recent Results (from the past 12 hour(s))   GLUCOSE, POC    Collection Time: 06/24/20 6:01 AM   Result Value Ref Range    Glucose (POC) 87 65 - 100 mg/dL    Performed by Ellie Vergara    GLUCOSE, POC    Collection Time: 06/24/20 11:25 AM   Result Value Ref Range    Glucose (POC) 84 65 - 100 mg/dL    Performed by Sania Kramer          Medications: Reviewed   Date/Time:  6/24/2020    I have personally reviewed the history and independently examined the patient. I have reviewed the chart and agree with the documentation recorded by the Mid Level Provider, including the assessment, treatment plan, and disposition. ASSESSMENT AND PLAN:  Dysphagia is secondary to esophageal stricture, concern for malignancy. Biopsy is done but PEG could not be placed. Would consult IR for G tube placement. Spoke with daughter who favors G tube over stent placement.      Ivelisse Wilks MD

## 2020-06-24 NOTE — ROUTINE PROCESS
Bedside shift change report given to [address] [place] [address] (oncoming nurse) by Susie Valdez RN (offgoing nurse). Report included the following information SBAR, Kardex, Intake/Output and MAR.

## 2020-06-24 NOTE — PROGRESS NOTES
[address] [facility] Adult  Hospitalist Group                                                                                          Hospitalist Progress Note  Paulina Kwon MD  Answering service: [phone] or [phone] from in house phone        Date of Service:  2020  NAME:  Sonia Kramer  :  1931  MRN:  [id number]    This documentation was facilitated by a Voice Recognition software and may contain inadvertent typographical errors. Admission Summary: This is an 80-year-old female with a significant history of dementia, diabetes, hypertension, hyper lipidemia, stroke, depression and anemia was brought to the ED by her daughter for failure to thrive. Patient has dysphagia to solid food tolerating only liquids. She reportedly had a negative endoscopy few months back. Interval history / Subjective: Follow-up for dysphagia, esophageal stricture due to tumor likely cancerous   . Ms. Jose Bishop remains pleasantly confused. Sitter in the room. . Talked with her daughter who asked if we confirm cancer. I told her the CT is suggestive,we need biopsy to confirm it is cancer. She is okay for EGD. Discussed with GI team    Assessment & Plan:     Dysphagia due to distal esophageal stricture due to tumor   FTT. Severe protein caloric malnutrition   Body mass index is 14.7 kg/m². Dehydration. hyponatremia resolved. -Continue IV fluid  -EGD showed:Granular scaly looking mucosal patches 2-4 mm in size were noted at 32 cm. A tight stricture with ulceration and friability was noted at 34 cm and was not traversed even with the Ultrathin scope. Exact length and nature of the stricture could not be evaluated due to inability to traverse the lesion.    -She needs PEG with or without esophageal stent, daughter okay with that       Advanced dementia with FTT  -Reorient. Fall precaution  -On Remeron     Hypertension. BP low normal.Held all antihypertensives. DM  -Accucheks. Humalog high sensitivity SS.Hold metformin     Patient's Baseline: Ambulates with walking  DVT ppx: scd  Code status:  Now DNR. Disposition: TBD     Hospital Problems  Date Reviewed: 6/2/2020          Codes Class Noted POA    Dysphagia ICD-10-CM: R13.10  ICD-9-CM: 787.20  6/22/2020 Unknown        FTT (failure to thrive) in adult ICD-10-CM: R62.7  ICD-9-CM: 783.7  6/22/2020 Unknown        Tumor of esophagus ICD-10-CM: D49.0  ICD-9-CM: 239.0  6/22/2020 Unknown                Review of Systems:   Review of systems not obtained due to patient factors. Vital Signs:    Last 24hrs VS reviewed since prior progress note. Most recent are:  Visit Vitals  /65   Pulse 85   Temp 98.3 °F (36.8 °C)   Resp 16   Ht 5' 3\" (1.6 m)   Wt 37.6 kg (83 lb)   SpO2 96%   BMI 14.70 kg/m²         Intake/Output Summary (Last 24 hours) at 6/24/2020 5798  Last data filed at 6/24/2020 7035  Gross per 24 hour   Intake    Output 200 ml   Net -200 ml        Physical Examination:             Constitutional:  Alert. Pleasantly confused. HEENT:  Atraumatic. Oral mucosa moist,. Non icteric sclera. No pallor. Resp:  CTA bilaterally. No wheezing/rhonchi/rales. No accessory muscle use   Chest Wall: No deformity   CV:  Regular rhythm, normal rate, no murmurs, gallops, rubs    GI:  Soft, non distended, non tender. normoactive bowel sounds, no hepatosplenomegaly    :  No CVA or suprapubic tenderness    Musculoskeletal:  No edema, warm, 2+ pulses throughout    Neurologic:  Mental status: Alert. Pleasantly confused.   Cranial nerves II-XII : WNL  Motor exam:Moves all extremities symmetrically              Data Review:    Review and/or order of clinical lab test  Review and/or order of tests in the radiology section of CPT  Review and/or order of tests in the medicine section of CPT      Labs:     Recent Labs     06/23/20  0239 06/22/20  1051   WBC 6.8 7.5   HGB 9.9* 11.1*   HCT 30.1* 35.0    430*     Recent Labs     06/23/20  0239 06/22/20  1051    134*   K 3.5 4.3    103   CO2 24 23   BUN 10 14   CREA 0.55 0.80   * 149*   CA 9.2 9.7   MG 1.4*  --    PHOS 2.9  --      Recent Labs     06/23/20  0239 06/22/20  1051   ALT 10* 11*   AP 58 64   TBILI 0.4 0.5   TP 6.7 7.6   ALB 2.6* 3.0*   GLOB 4.1* 4.6*     No results for input(s): INR, PTP, APTT, INREXT, INREXT in the last 72 hours. No results for input(s): FE, TIBC, PSAT, FERR in the last 72 hours. Lab Results   Component Value Date/Time    Folate 3.8 05/06/2020 09:09 AM      No results for input(s): PH, PCO2, PO2 in the last 72 hours.   Recent Labs     06/22/20  1051   TROIQ <0.05     Lab Results   Component Value Date/Time    Cholesterol, total 116 05/06/2020 09:09 AM    HDL Cholesterol 43 05/06/2020 09:09 AM    LDL, calculated 56 05/06/2020 09:09 AM    Triglyceride 84 05/06/2020 09:09 AM    CHOL/HDL Ratio 3.6 09/19/2012 03:30 AM     Lab Results   Component Value Date/Time    Glucose (POC) 87 06/24/2020 06:01 AM    Glucose (POC) 95 06/23/2020 11:49 PM    Glucose (POC) 95 06/23/2020 09:37 PM    Glucose (POC) 116 (H) 06/23/2020 06:20 PM    Glucose (POC) 131 (H) 06/23/2020 04:02 PM     Lab Results   Component Value Date/Time    Color YELLOW/STRAW 12/02/2019 06:03 AM    Appearance CLOUDY (A) 12/02/2019 06:03 AM    Specific gravity 1.017 12/02/2019 06:03 AM    pH (UA) 5.5 12/02/2019 06:03 AM    Protein 30 (A) 12/02/2019 06:03 AM    Glucose NEGATIVE  12/02/2019 06:03 AM    Ketone NEGATIVE  12/02/2019 06:03 AM    Bilirubin NEGATIVE  12/02/2019 06:03 AM    Urobilinogen 1.0 12/02/2019 06:03 AM    Nitrites NEGATIVE  12/02/2019 06:03 AM    Leukocyte Esterase SMALL (A) 12/02/2019 06:03 AM    Epithelial cells FEW 12/02/2019 06:03 AM    Bacteria NEGATIVE  12/02/2019 06:03 AM    WBC 0-4 12/02/2019 06:03 AM    RBC 0-5 12/02/2019 06:03 AM         Medications Reviewed:     Current Facility-Administered Medications   Medication Dose Route Frequency    sodium chloride (NS) flush 5-40 mL  5-40 mL IntraVENous Q8H    sodium chloride (NS) flush 5-40 mL  5-40 mL IntraVENous PRN    insulin lispro (HUMALOG) injection   SubCUTAneous Q6H    glucose chewable tablet 16 g  4 Tab Oral PRN    glucagon (GLUCAGEN) injection 1 mg  1 mg IntraMUSCular PRN    dextrose 10% infusion 0-250 mL  0-250 mL IntraVENous PRN     ______________________________________________________________________  EXPECTED LENGTH OF STAY: 3d 16h  ACTUAL LENGTH OF STAY:          2                 Abbie Monroy MD

## 2020-06-24 NOTE — ROUTINE PROCESS
116 [facility] 11/14/1931 
[id number] Situation: 
Verbal report received from: [facility] Procedure: Procedure(s): ESOPHAGOGASTRODUODENOSCOPY (EGD) : - 
ESOPHAGOGASTRODUODENAL (EGD) BIOPSY Background: 
 
Preoperative diagnosis: dysphagia 
esophageal mass Postoperative diagnosis: 1. Esophageal Stricture :  Dr. Salvatore Sánchez 
Assistant(s): Endoscopy Technician-1: Carley Hutchins Endoscopy RN-1: Briana Joseph Specimens:  
ID Type Source Tests Collected by Time Destination 1 : Esophagus Stricture at 35 cm Preservative Esophagus, Mid  Ric Flor MD 6/24/2020 7375 Pathology H. Pylori -no Assessment: 
Intra-procedure medications V Anesthesia gave intra-procedure sedation and medications, see anesthesia flow sheet Intravenous fluids: NS@ Merrill Hammed Vital signs stable Abdominal assessment: round and soft Recommendation: 
Discharge patient per MD order* Return to floor room 509

## 2020-06-24 NOTE — PROCEDURES
118 [facility].  217 [facility] [address], [address]  [phone]                            NAME:  Patricia Wilson   :   1931   MRN:   [id number]     Date/Time:  2020 4:12 PM    Esophagogastroduodenoscopy (EGD) Procedure Note    :  Dolores Valentine MD    Staff: Endoscopy Technician-1: Anh BAY  Endoscopy RN-1: Sulaiman Crooks     Implants: none    Referring Provider:  Eliseo Riley DO    Anethesia/Sedation:  MAC anesthesia    Procedure Details     After infom consent was obtained for the procedure, with all risks and benefits of procedure explained the patient was taken to the endoscopy suite and placed in the left lateral decubitus position. Following sequential administration of sedation as per above, the [id number] gastroscope followed by Ultrathin gastroscope was inserted into the mouth and advanced under direct vision to esophagus. A careful inspection was made as the gastroscope was withdrawn, including a retroflexed view of the proximal stomach; findings and interventions are described below. Findings:  Esophagus: Granular scaly looking mucosal patches 2-4 mm in size were noted at 32 cm. A tight stricture with ulceration and friability was noted at 34 cm and was not traversed even with the Ultrathin scope. Exact length and nature of the stricture could not be evaluated due to inability to traverse the lesion. Stomach: not examined  Duodenum/jejunum: not examined      Therapies:  biopsy of esophageal stricture    Specimens: biopsy of esophageal stricture at 34 cm           EBL: minimal    Complications:   None; patient tolerated the procedure well. Impression:    See Postoperative diagnosis above    Recommendations:  -Await pathology. , -Follow symptoms. , -Keep NPO., -Would need PEG tube by IR/esopaegal stent placement    Discharge disposition:  Continue care in hospital    Dolores Valentine MD

## 2020-06-25 ENCOUNTER — HOSPITAL ENCOUNTER (OUTPATIENT)
Dept: INTERVENTIONAL RADIOLOGY/VASCULAR | Age: 85
Discharge: HOME | DRG: 374 | End: 2020-06-25
Attending: NURSE PRACTITIONER | Admitting: HOSPITALIST
Payer: MEDICARE

## 2020-06-25 ENCOUNTER — APPOINTMENT (OUTPATIENT)
Dept: INTERVENTIONAL RADIOLOGY/VASCULAR | Age: 85
DRG: 374 | End: 2020-06-25
Attending: NURSE PRACTITIONER
Payer: MEDICARE

## 2020-06-25 LAB
ANION GAP SERPL CALC-SCNC: 12 MMOL/L (ref 5–15)
BASOPHILS # BLD: 0 K/UL (ref 0–0.1)
BASOPHILS NFR BLD: 0 % (ref 0–1)
BUN SERPL-MCNC: 12 MG/DL (ref 6–20)
BUN/CREAT SERPL: 23 (ref 12–20)
CALCIUM SERPL-MCNC: 9.2 MG/DL (ref 8.5–10.1)
CHLORIDE SERPL-SCNC: 108 MMOL/L (ref 97–108)
CO2 SERPL-SCNC: 19 MMOL/L (ref 21–32)
CREAT SERPL-MCNC: 0.53 MG/DL (ref 0.55–1.02)
DIFFERENTIAL METHOD BLD: ABNORMAL
EOSINOPHIL # BLD: 0 K/UL (ref 0–0.4)
EOSINOPHIL NFR BLD: 0 % (ref 0–7)
ERYTHROCYTE [DISTWIDTH] IN BLOOD BY AUTOMATED COUNT: 12.3 % (ref 11.5–14.5)
GLUCOSE BLD STRIP.AUTO-MCNC: 73 MG/DL (ref 65–100)
GLUCOSE BLD STRIP.AUTO-MCNC: 74 MG/DL (ref 65–100)
GLUCOSE BLD STRIP.AUTO-MCNC: 75 MG/DL (ref 65–100)
GLUCOSE SERPL-MCNC: 79 MG/DL (ref 65–100)
HCT VFR BLD AUTO: 33.5 % (ref 35–47)
HGB BLD-MCNC: 10.7 G/DL (ref 11.5–16)
IMM GRANULOCYTES # BLD AUTO: 0.1 K/UL (ref 0–0.04)
IMM GRANULOCYTES NFR BLD AUTO: 1 % (ref 0–0.5)
LYMPHOCYTES # BLD: 0.6 K/UL (ref 0.8–3.5)
LYMPHOCYTES NFR BLD: 8 % (ref 12–49)
MAGNESIUM SERPL-MCNC: 1.4 MG/DL (ref 1.6–2.4)
MCH RBC QN AUTO: 29 PG (ref 26–34)
MCHC RBC AUTO-ENTMCNC: 31.9 G/DL (ref 30–36.5)
MCV RBC AUTO: 90.8 FL (ref 80–99)
MONOCYTES # BLD: 0.4 K/UL (ref 0–1)
MONOCYTES NFR BLD: 5 % (ref 5–13)
NEUTS SEG # BLD: 7 K/UL (ref 1.8–8)
NEUTS SEG NFR BLD: 86 % (ref 32–75)
NRBC # BLD: 0 K/UL (ref 0–0.01)
NRBC BLD-RTO: 0 PER 100 WBC
PLATELET # BLD AUTO: 349 K/UL (ref 150–400)
PMV BLD AUTO: 8.9 FL (ref 8.9–12.9)
POTASSIUM SERPL-SCNC: 3.3 MMOL/L (ref 3.5–5.1)
RBC # BLD AUTO: 3.69 M/UL (ref 3.8–5.2)
RBC MORPH BLD: ABNORMAL
RBC MORPH BLD: ABNORMAL
SERVICE CMNT-IMP: NORMAL
SODIUM SERPL-SCNC: 139 MMOL/L (ref 136–145)
WBC # BLD AUTO: 8.1 K/UL (ref 3.6–11)

## 2020-06-25 PROCEDURE — 80048 BASIC METABOLIC PNL TOTAL CA: CPT

## 2020-06-25 PROCEDURE — 77030011230 HC DIL VESL COON COOK -B

## 2020-06-25 PROCEDURE — 82962 GLUCOSE BLOOD TEST: CPT

## 2020-06-25 PROCEDURE — C1769 GUIDE WIRE: HCPCS

## 2020-06-25 PROCEDURE — 74011250636 HC RX REV CODE- 250/636

## 2020-06-25 PROCEDURE — 36415 COLL VENOUS BLD VENIPUNCTURE: CPT

## 2020-06-25 PROCEDURE — 83735 ASSAY OF MAGNESIUM: CPT

## 2020-06-25 PROCEDURE — 77030004561 HC CATH ANGI DX COBRA ANGI -B

## 2020-06-25 PROCEDURE — 74011000250 HC RX REV CODE- 250: Performed by: RADIOLOGY

## 2020-06-25 PROCEDURE — 49440 PLACE GASTROSTOMY TUBE PERC: CPT

## 2020-06-25 PROCEDURE — C1894 INTRO/SHEATH, NON-LASER: HCPCS

## 2020-06-25 PROCEDURE — 77030018617 HC TU FEED GASTMY1 COOK -B

## 2020-06-25 PROCEDURE — 74011636320 HC RX REV CODE- 636/320: Performed by: RADIOLOGY

## 2020-06-25 PROCEDURE — 74011250636 HC RX REV CODE- 250/636: Performed by: RADIOLOGY

## 2020-06-25 PROCEDURE — 65270000032 HC RM SEMIPRIVATE

## 2020-06-25 PROCEDURE — 85025 COMPLETE CBC W/AUTO DIFF WBC: CPT

## 2020-06-25 PROCEDURE — 74011250636 HC RX REV CODE- 250/636: Performed by: HOSPITALIST

## 2020-06-25 RX ORDER — LIDOCAINE HYDROCHLORIDE 20 MG/ML
JELLY TOPICAL AS NEEDED
Status: DISCONTINUED | OUTPATIENT
Start: 2020-06-25 | End: 2020-06-25 | Stop reason: HOSPADM

## 2020-06-25 RX ORDER — NALOXONE HYDROCHLORIDE 0.4 MG/ML
0.4 INJECTION, SOLUTION INTRAMUSCULAR; INTRAVENOUS; SUBCUTANEOUS AS NEEDED
Status: DISCONTINUED | OUTPATIENT
Start: 2020-06-25 | End: 2020-06-25 | Stop reason: HOSPADM

## 2020-06-25 RX ORDER — SODIUM CHLORIDE 0.9 % (FLUSH) 0.9 %
SYRINGE (ML) INJECTION
Status: DISPENSED
Start: 2020-06-25 | End: 2020-06-26

## 2020-06-25 RX ORDER — FENTANYL CITRATE 50 UG/ML
200 INJECTION, SOLUTION INTRAMUSCULAR; INTRAVENOUS
Status: CANCELLED | OUTPATIENT
Start: 2020-06-25

## 2020-06-25 RX ORDER — FENTANYL CITRATE 50 UG/ML
INJECTION, SOLUTION INTRAMUSCULAR; INTRAVENOUS
Status: COMPLETED
Start: 2020-06-25 | End: 2020-06-25

## 2020-06-25 RX ORDER — DEXTROSE, SODIUM CHLORIDE, AND POTASSIUM CHLORIDE 5; .9; .15 G/100ML; G/100ML; G/100ML
50 INJECTION INTRAVENOUS CONTINUOUS
Status: DISCONTINUED | OUTPATIENT
Start: 2020-06-25 | End: 2020-06-26

## 2020-06-25 RX ORDER — MIDAZOLAM HYDROCHLORIDE 1 MG/ML
5 INJECTION, SOLUTION INTRAMUSCULAR; INTRAVENOUS
Status: DISCONTINUED | OUTPATIENT
Start: 2020-06-25 | End: 2020-06-25 | Stop reason: HOSPADM

## 2020-06-25 RX ORDER — FLUMAZENIL 0.1 MG/ML
0.5 INJECTION INTRAVENOUS ONCE
Status: DISCONTINUED | OUTPATIENT
Start: 2020-06-25 | End: 2020-06-25 | Stop reason: HOSPADM

## 2020-06-25 RX ORDER — MIDAZOLAM HYDROCHLORIDE 1 MG/ML
INJECTION, SOLUTION INTRAMUSCULAR; INTRAVENOUS
Status: COMPLETED
Start: 2020-06-25 | End: 2020-06-25

## 2020-06-25 RX ORDER — FLUMAZENIL 0.1 MG/ML
0.5 INJECTION INTRAVENOUS ONCE
Status: CANCELLED | OUTPATIENT
Start: 2020-06-25 | End: 2020-06-25

## 2020-06-25 RX ORDER — FENTANYL CITRATE 50 UG/ML
200 INJECTION, SOLUTION INTRAMUSCULAR; INTRAVENOUS
Status: DISCONTINUED | OUTPATIENT
Start: 2020-06-25 | End: 2020-06-25 | Stop reason: HOSPADM

## 2020-06-25 RX ORDER — SODIUM CHLORIDE 9 MG/ML
25 INJECTION, SOLUTION INTRAVENOUS CONTINUOUS
Status: DISCONTINUED | OUTPATIENT
Start: 2020-06-25 | End: 2020-06-25 | Stop reason: HOSPADM

## 2020-06-25 RX ORDER — LIDOCAINE HYDROCHLORIDE 20 MG/ML
INJECTION, SOLUTION INFILTRATION; PERINEURAL
Status: DISPENSED
Start: 2020-06-25 | End: 2020-06-26

## 2020-06-25 RX ORDER — NALOXONE HYDROCHLORIDE 0.4 MG/ML
0.4 INJECTION, SOLUTION INTRAMUSCULAR; INTRAVENOUS; SUBCUTANEOUS AS NEEDED
Status: CANCELLED | OUTPATIENT
Start: 2020-06-25

## 2020-06-25 RX ORDER — MIDAZOLAM HYDROCHLORIDE 1 MG/ML
5 INJECTION, SOLUTION INTRAMUSCULAR; INTRAVENOUS
Status: CANCELLED | OUTPATIENT
Start: 2020-06-25

## 2020-06-25 RX ORDER — SODIUM CHLORIDE 9 MG/ML
25 INJECTION, SOLUTION INTRAVENOUS CONTINUOUS
Status: CANCELLED | OUTPATIENT
Start: 2020-06-25

## 2020-06-25 RX ORDER — LIDOCAINE HYDROCHLORIDE 20 MG/ML
20 INJECTION, SOLUTION INFILTRATION; PERINEURAL ONCE
Status: COMPLETED | OUTPATIENT
Start: 2020-06-25 | End: 2020-06-25

## 2020-06-25 RX ADMIN — FENTANYL CITRATE 12.5 MCG: 50 INJECTION INTRAMUSCULAR; INTRAVENOUS at 15:42

## 2020-06-25 RX ADMIN — MIDAZOLAM 0.5 MG: 1 INJECTION INTRAMUSCULAR; INTRAVENOUS at 15:38

## 2020-06-25 RX ADMIN — FENTANYL CITRATE 12.5 MCG: 50 INJECTION INTRAMUSCULAR; INTRAVENOUS at 15:54

## 2020-06-25 RX ADMIN — SODIUM CHLORIDE 25 ML/HR: 900 INJECTION, SOLUTION INTRAVENOUS at 15:30

## 2020-06-25 RX ADMIN — IOHEXOL 20 ML: 240 INJECTION, SOLUTION INTRATHECAL; INTRAVASCULAR; INTRAVENOUS; ORAL at 16:27

## 2020-06-25 RX ADMIN — FENTANYL CITRATE 12.5 MCG: 50 INJECTION INTRAMUSCULAR; INTRAVENOUS at 15:38

## 2020-06-25 RX ADMIN — MIDAZOLAM 0.5 MG: 1 INJECTION INTRAMUSCULAR; INTRAVENOUS at 15:40

## 2020-06-25 RX ADMIN — Medication 10 ML: at 22:00

## 2020-06-25 RX ADMIN — LIDOCAINE HYDROCHLORIDE 8 ML: 20 INJECTION, SOLUTION INFILTRATION; PERINEURAL at 16:26

## 2020-06-25 RX ADMIN — POTASSIUM CHLORIDE, DEXTROSE MONOHYDRATE AND SODIUM CHLORIDE 50 ML/HR: 150; 5; 900 INJECTION, SOLUTION INTRAVENOUS at 23:30

## 2020-06-25 RX ADMIN — MIDAZOLAM 0.5 MG: 1 INJECTION INTRAMUSCULAR; INTRAVENOUS at 15:45

## 2020-06-25 RX ADMIN — Medication 10 ML: at 14:00

## 2020-06-25 RX ADMIN — LIDOCAINE HYDROCHLORIDE 3 ML: 20 JELLY TOPICAL at 16:27

## 2020-06-25 NOTE — PROGRESS NOTES
Palliative/hospice    Chart reviewed and patient had been seen on 6/23. Unfortunately, EGD cannot be completed yesterday secondary to obstructing mass. Attempts had IR/percutaneous PEG today not able to be done secondary to inability to contact daughter. Talked with Lucy-hospice liaison. She was able to talk with the daughter. Patient apparently will be going to the [facility] where [address] [place] [address] does not have a contract. Sirisha Adkins will clarify with case management about potential placement. Patient does not meet inpatient criteria for hospice but certainly appropriate for outpatient hospice. Our team will continue to follow peripherally and please call if questions.

## 2020-06-25 NOTE — PROGRESS NOTES
[facility].  217 [facility] [address]  [place], [address]  [phone]                GI PROGRESS NOTE      NAME:   Melanie Hartman   :    1931   MRN:    [id number]     Assessment/Plan   Dysphagia/ Concern of Esophageal cancer  - BA swallow 20:  In the distal esophagus, a circumferential mass extends over approximately 20 mm. This causes tight, persistent stenosis of the distal esophageal lumen, with  prolonged delay of oral contrast progression across the tumor and into the stomach. Esophagoesophageal reflux is associated. Smooth, lobulated projections of the tumor indent the superior-most stomach laterally. At the least, this is extrinsic mass effect. Direct invasion is likely. Inclusive of this, tumor extends over nearly 40 mm. -  CT chest, abdomen, pelvis 20:     1. Distal esophageal mass extending into the adjacent soft tissues and partially  narrowing the left inferior pulmonary vein     2. Left pleural effusion with question pleural-based metastasis     3. 9 mm nodule in the right upper lobe. It is partially calcified.     4. Indeterminate splenic lesion measuring 3.5 cm     5. 3.3 cm infrarenal abdominal aortic aneurysm with occlusion of the right  common iliac artery, internal iliac artery and external iliac artery with  reconstitution of the right common femoral artery by collaterals     6. 2.7 cm cystic pancreatic lesion     7. 3 cm right thyroid nodule     - Reviewed chart - Dr. Julianne Cisneros had discussed the possibility of EGD with biopsy and possible feeding tube. Daughter decided on EGD with PEG placement after speaking with hospitalist and GI  - Palliative care consulted: options of PEG tube with IR vs.may need esophageal stent vs PEG tube depending. -EGD 2020:  Esophagus: Granular scaly looking mucosal patches 2-4 mm in size were noted at 32 cm.  A tight stricture with ulceration and friability was noted at 34 cm and was not traversed even with the Ultrathin scope. Exact length and nature of the stricture could not be evaluated due to inability to traverse the lesion. Stomach: not examined Duodenum/jejunum: not examined  Recommendation of IR PEG placement   -Order placed for IR placement of PEG tube for 6/25/2020  Failure to thrive  Weight loss, unintentional  Anorexia  Alzheimer's  Will discuss with Dr. 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  female Patient resting in bed with eyes closed. Sitter at bedside   Review of Systems      Objective:     VITALS:   Last 24hrs VS reviewed since prior hospitalist progress note. Most recent are:  Visit Vitals  /74   Pulse 75   Temp 98.6 °F (37 °C)   Resp 18   Ht 5' 3\" (1.6 m)   Wt 37.6 kg (83 lb)   SpO2 98%   BMI 14.70 kg/m²       Intake/Output Summary (Last 24 hours) at 6/25/2020 1019  Last data filed at 6/24/2020 1945  Gross per 24 hour   Intake 300 ml   Output    Net 300 ml        PHYSICAL EXAM:  General appearance: cooperative, no distress, appears stated age. Frail appearance  Skin: Extremities and face reveal no rashes or jaundice  HEENT: Sclerae anicteric. Extra-occular muscles are intact. Cardiovascular: Regular rate and rhythm. Respiratory: Clear breath sounds with no wheezes, rales, or rhonchi. GI: Abdomen nondistended, soft, and nontender. Normal active bowel sounds. No masses palpable. Rectal: Deferred   Musculoskeletal: No edema of the lower legs.    Neurological: Patient is alert.    Psychiatric: No agitation            Lab Data   Recent Results (from the past 12 hour(s))   GLUCOSE, POC    Collection Time: 06/24/20 11:14 PM   Result Value Ref Range    Glucose (POC) 78 65 - 100 mg/dL    Performed by ABDIEL RAMIREZ JR. [facility]  PCT    GLUCOSE, POC    Collection Time: 06/25/20  5:05 AM   Result Value Ref Range    Glucose (POC) 75 65 - 100 mg/dL    Performed by ABDIEL RAMIREZ JR. [facility]  PCT          Medications: Reviewed  Date/Time:  6/25/2020

## 2020-06-25 NOTE — PROGRESS NOTES
CM noted events today, patient still unable to get PEG, this time due to family not being available for consent. CM and nursing have made multiple attempts to reach daughter Carlos Sánchez, [phone]) with no success. Nursing had spoken with the daughter earlier today and informed her she would be receiving a call to give consent. 2:15pm: CM was able to reach daughter Dash Foote, obtain an alternate number (cell: [phone]). Dash Foote had just gotten off the phone with another staff member at the hospital to give permission for the peg tube placement. Dash Foote confirmed that patient lives with her and her  and that Dash Foote assisted the patient with her ADLs and IADLs prior to admission. Patient was, however, independent with ambulation, did not require use of an assistive device or any DME. Dash Foote stated that she would like patient to go to [place] at the [facility] at discharge. CM will send a referral to them via [employer].      Tamra GARCIAW, ACM

## 2020-06-25 NOTE — PROGRESS NOTES
TRANSFER - OUT REPORT:    Verbal report given to Pooja Herron, unit RN on [employer]  being transferred to [phone] for routine progression of care       Report consisted of patients Situation, Background, Assessment and   Recommendations(SBAR). Information from the following report(s) SBAR, Procedure Summary and MAR was reviewed with the receiving nurse. Lines:   Peripheral IV 06/24/20 Left; Lower Forearm (Active)   Site Assessment Clean, dry, & intact 6/25/2020  9:10 AM   Phlebitis Assessment 0 6/25/2020  9:10 AM   Infiltration Assessment 0 6/25/2020  9:10 AM   Dressing Status Clean, dry, & intact 6/25/2020  9:10 AM   Dressing Type Transparent 6/25/2020  9:10 AM   Hub Color/Line Status Blue;Flushed;Capped 6/25/2020  9:10 AM   Action Taken Open ports on tubing capped 6/25/2020  9:10 AM   Alcohol Cap Used Yes 6/25/2020  9:10 AM        Opportunity for questions and clarification was provided.       Patient transported with:   PCT in bed back to unit; gastrostomy tube secured to abdomen with hypofix tape over split gauze

## 2020-06-25 NOTE — PROGRESS NOTES
Report to Jeremiah Mahan RN, pt to return to floor without procedure. Tried >4 X  To contact family for consent, without contact.

## 2020-06-25 NOTE — PROGRESS NOTES
Bedside shift change report given to Jose Kraft (oncoming nurse) by Sharyn Rizzo RN (offgoing nurse). Report included the following information SBAR, Kardex, Intake/Output, MAR and Recent Results.

## 2020-06-25 NOTE — PROGRESS NOTES
NUTRITION COMPLETE ASSESSMENT    RECOMMENDATIONS:   1. At very high risk for refeeding syndrome:   - Consider MVI goody bag - peripheral access gained   - Monitor K+, Mg, Phos with repletion PRN - check for next 3 days or until stable. 2. Very slow advancement of tube feeds once G-tube cleared for use:   - Jevity 1.5 @ 10ml/hr advanced 10ml/hr every 24hrs + 120ml flush q4hr   - add liquid MVI to meet DRIs   - abdominal binder    Interventions/Plan:   Food/Nutrient Delivery:    (-)   (MVI, thiamine) Initiate enteral nutrition    Assessment:   Reason for Assessment: New Nutrition Support    Diet: NPO   Supplements: None  Nutritionally Significant Medications: [x] Reviewed & Includes: SSI    Subjective: Staff Interviewed. Daughter called by no answer. Objective:  Pt admitted for dysphagia. PMHx: depression, DM, anemia, HTN, stroke, dementia. Lethargy and weakness with difficulty swallowing prior to admit. Mass of the distal esophagus with CT showing pleural effusion with question of mets along with splenic lesion, pancreatic lesion and right thyroid nodule. Daughter wanting PEG placement. EGD yesterday but unable to place PEG or stent d/t such tight stricture. Plans for G-tube placement in IR today. D5 infusion earlier this week but pt pulled out PIV per RN reports. Unsure of how much IVF she received since I/O flowsheet seems inaccurate. Pt remains at risk for refeeding syndrome once tube feeds started. Will check Mg and Phos as add-on. Montior and replete per provide as needed. Consider MVI goody bag if able to regain PIV access     Once tube feeds started recommend: Jevity 1.5 @ 10ml/hr advanced 10ml/hr every 24hrs + 120ml flush q4hr. Provides: 960ml, 1440kcal, 61g protein, 730ml free fluid + 720ml flush = 1450ml fluid. Meets 100% energy and protein needs. Will also need liquid MVI to meet DRIs for vitamins/minerals. Severe wt loss of 40# over past 6 months. Only 72% IBW.   Nutrition Focused Physical Exam:  Muscle Loss: [place] Muscle Wasting-Severe  Clavicle Region Muscle Wasting-Severe  Fat Loss: [place] Region Fat Loss-Severe  Cheek Region (Buccal Fat Pads) Fat Loss-Severe  Estimated Nutrition Needs:   Kcals/day: 4575 Kcals/day(1444-1520kcal)  Protein: 53 g(53-60g (1.4-1.6g/kg))  Fluid: 1500 ml(1ml/kcal)  Based On: Kcal/kg - specify (Comment)(38-40kcal/kg)  Weight Used: Actual wt(38kg)    Pt expected to meet estimated nutrient needs:  []   Yes     [x]  No [] Unable to predict at this time  Nutrition Diagnosis:   1. Malnutrition related to inadequate oral intake 2/2 dementia/esophageal mass as evidenced by severe wt loss of  33% x 5 months; muscle/fat wasting; <50% needs prior to admit; G-tube placement    Goals:     EN meeting at least 90% needs in 5-6 days; wt maintenance     Monitoring & Evaluation:    - Total energy intake   - Weight/weight change    Previous Nutrition Goals Met:   N/A  Previous Recommendations:    N/A    Education & Discharge Needs:    [x] None Identified   [] Identified and addressed    [x] Participated in care plan, discharge planning, and/or interdisciplinary rounds        Nutrition Discharge Plan:   [] Too soon to determine  [x] Other: will need enteral feeds on d/c - spoke with case mgmt. Cultural, Adventism and ethnic food preferences identified: None    Skin Integrity: [x]Intact  []Other  Edema: [x]None []Other  Last BM: pta  Food Allergies: [x]None []Other  Diet Restrictions: Food Intolerance: Ensure  Cultural/Quaker Preference(s): None     Anthropometrics:    Weight Loss Metrics 6/24/2020 6/2/2020 12/5/2019 12/2/2019 9/3/2019 5/31/2019 11/13/2018   Today's Wt 83 lb - 96 lb 96 lb 9 oz 100 lb 3.2 oz 100 lb 3.2 oz 100 lb   BMI 14.7 kg/m2 17.56 kg/m2 17.56 kg/m2 17.66 kg/m2 18.33 kg/m2 18.33 kg/m2 18.29 kg/m2      Weight Source: Patient stated  Height: 5' 3\" (160 cm),    Body mass index is 14.7 kg/m².      IBW : 52.2 kg (115 lb), % IBW (Calculated): 72.17 %   , Labs:    Lab Results   Component Value Date/Time    Sodium 139 06/25/2020 09:51 AM    Potassium 3.3 (L) 06/25/2020 09:51 AM    Chloride 108 06/25/2020 09:51 AM    CO2 19 (L) 06/25/2020 09:51 AM    Glucose 79 06/25/2020 09:51 AM    BUN 12 06/25/2020 09:51 AM    Creatinine 0.53 (L) 06/25/2020 09:51 AM    Calcium 9.2 06/25/2020 09:51 AM    Magnesium 1.4 (L) 06/23/2020 02:39 AM    Phosphorus 2.9 06/23/2020 02:39 AM    Albumin 2.6 (L) 06/23/2020 02:39 AM     Lab Results   Component Value Date/Time    Hemoglobin A1c 7.5 (H) 05/06/2020 09:09 AM     Susi Kothari RD [facility], Pager #[phone] or via [employer]

## 2020-06-25 NOTE — PROGRESS NOTES
[facility]  Hospitalist Group                                                                                          Hospitalist Progress Note  Calvin Appiah MD  Answering service: [phone] OR [phone] from in house phone        Date of Service:  2020  NAME:  Jacques Woody  :  1931  MRN:  [id number]    This documentation was facilitated by a Voice Recognition software and may contain inadvertent typographical errors. Admission Summary: This is an 20-year-old female with a significant history of dementia, diabetes, hypertension, hyper lipidemia, stroke, depression and anemia was brought to the ED by her daughter for failure to thrive. Patient has dysphagia to solid food tolerating only liquids. She reportedly had a negative endoscopy few months back. 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 Patient appears comfortable during my eval, denied any pain. 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 -EGD showed:Granular scaly looking mucosal patches 2-4 mm in size were noted at 32 cm. A tight stricture with ulceration and friability was noted at 34 cm and was not traversed even with the Ultrathin scope. Exact length and nature of the stricture could not be evaluated due to inability to traverse the lesion.    -Pathology pending, GI following -plan for placement of gastrostomy tube  -Eventually discharge to facility with hospice. Advanced dementia with failure to thrive  -Reorient. Fall precaution  -On Remeron     Hypertension. BP low normal.Held all antihypertensives. Dehydration. hyponatremia resolved.   Dextrose fluids till we start tube feeds    DM  -A1 c is at goal for her age, 7.5 in 5/2020  -Monitor glucose once tube feeds at started       Patient's Baseline: Ambulates with walking  DVT ppx: scd  Code status:  Now DNR. Disposition: facility with hospice     Hospital Problems  Date Reviewed: 6/2/2020          Codes Class Noted POA    Dysphagia ICD-10-CM: R13.10  ICD-9-CM: 787.20  6/22/2020 Unknown        FTT (failure to thrive) in adult ICD-10-CM: R62.7  ICD-9-CM: 783.7  6/22/2020 Unknown        Tumor of esophagus ICD-10-CM: D49.0  ICD-9-CM: 239.0  6/22/2020 Unknown                Review of Systems:   Review of systems not obtained due to patient factors. Vital Signs:    Last 24hrs VS reviewed since prior progress note. Most recent are:  Visit Vitals  /82 (BP 1 Location: Left arm, BP Patient Position: At rest)   Pulse 100   Temp 97.9 °F (36.6 °C)   Resp 18   Ht 5' 3\" (1.6 m)   Wt 37.6 kg (83 lb)   SpO2 100%   BMI 14.70 kg/m²         Intake/Output Summary (Last 24 hours) at 6/25/2020 1810  Last data filed at 6/24/2020 1945  Gross per 24 hour   Intake 0 ml   Output    Net 0 ml        Physical Examination:             Constitutional:  Alert. Pleasantly confused,elderly patient   HEENT:  Atraumatic. Oral mucosa dry,. Non icteric sclera. No pallor. Resp:  CTA bilaterally. No wheezing/rhonchi/rales. No accessory muscle use   Chest Wall: No deformity   CV:  Regular rhythm, normal rate    GI:  Soft, non distended, non tender. normoactive bowel sounds   :  No CVA or suprapubic tenderness    Musculoskeletal:  No edema, warm, 2+ pulses throughout    Neurologic:  Mental status: Alert. Pleasantly confused.   Cranial nerves II-XII : WNL  Motor exam:Moves all extremities symmetrically              Data Review:    Review and/or order of clinical lab test  Review and/or order of tests in the radiology section of CPT  Review and/or order of tests in the medicine section of CPT      Labs:     Recent Labs     06/25/20  0951 06/23/20  0239   WBC 8.1 6.8   HGB 10.7* 9.9*   HCT 33.5* 30.1*    382     Recent Labs     06/25/20  0951 06/23/20  0239    136   K 3.3* 3.5    105   CO2 19* 24   BUN 12 10   CREA 0.53* 0.55   GLU 79 130*   CA 9.2 9.2   MG  --  1.4*   PHOS  --  2.9     Recent Labs     06/23/20  0239   ALT 10*   AP 58   TBILI 0.4   TP 6.7   ALB 2.6*   GLOB 4.1*     No results for input(s): INR, PTP, APTT, INREXT, INREXT in the last 72 hours. No results for input(s): FE, TIBC, PSAT, FERR in the last 72 hours. Lab Results   Component Value Date/Time    Folate 3.8 05/06/2020 09:09 AM      No results for input(s): PH, PCO2, PO2 in the last 72 hours. No results for input(s): CPK, CKNDX, TROIQ in the last 72 hours.     No lab exists for component: CPKMB  Lab Results   Component Value Date/Time    Cholesterol, total 116 05/06/2020 09:09 AM    HDL Cholesterol 43 05/06/2020 09:09 AM    LDL, calculated 56 05/06/2020 09:09 AM    Triglyceride 84 05/06/2020 09:09 AM    CHOL/HDL Ratio 3.6 09/19/2012 03:30 AM     Lab Results   Component Value Date/Time    Glucose (POC) 74 06/25/2020 05:44 PM    Glucose (POC) 73 06/25/2020 11:26 AM    Glucose (POC) 75 06/25/2020 05:05 AM    Glucose (POC) 78 06/24/2020 11:14 PM    Glucose (POC) 151 (H) 06/24/2020 06:28 PM     Lab Results   Component Value Date/Time    Color YELLOW/STRAW 12/02/2019 06:03 AM    Appearance CLOUDY (A) 12/02/2019 06:03 AM    Specific gravity 1.017 12/02/2019 06:03 AM    pH (UA) 5.5 12/02/2019 06:03 AM    Protein 30 (A) 12/02/2019 06:03 AM    Glucose NEGATIVE  12/02/2019 06:03 AM    Ketone NEGATIVE  12/02/2019 06:03 AM    Bilirubin NEGATIVE  12/02/2019 06:03 AM    Urobilinogen 1.0 12/02/2019 06:03 AM    Nitrites NEGATIVE  12/02/2019 06:03 AM    Leukocyte Esterase SMALL (A) 12/02/2019 06:03 AM    Epithelial cells FEW 12/02/2019 06:03 AM    Bacteria NEGATIVE  12/02/2019 06:03 AM    WBC 0-4 12/02/2019 06:03 AM    RBC 0-5 12/02/2019 06:03 AM         Medications Reviewed:     Current Facility-Administered Medications Medication Dose Route Frequency    lidocaine (XYLOCAINE) 20 mg/mL (2 %) injection        sodium chloride (NS) flush        sodium chloride (NS) flush 5-40 mL  5-40 mL IntraVENous Q8H    sodium chloride (NS) flush 5-40 mL  5-40 mL IntraVENous PRN    sodium chloride (NS) flush 5-40 mL  5-40 mL IntraVENous Q8H    sodium chloride (NS) flush 5-40 mL  5-40 mL IntraVENous PRN    insulin lispro (HUMALOG) injection   SubCUTAneous Q6H    glucose chewable tablet 16 g  4 Tab Oral PRN    glucagon (GLUCAGEN) injection 1 mg  1 mg IntraMUSCular PRN    dextrose 10% infusion 0-250 mL  0-250 mL IntraVENous PRN     ______________________________________________________________________  EXPECTED LENGTH OF STAY: 3d 16h  ACTUAL LENGTH OF STAY:          3                 Alejandro Hutchinson MD

## 2020-06-25 NOTE — PROGRESS NOTES
Bedside and Verbal shift change report given to [address] [place] [address] Road  (oncoming nurse) by Hannah Evans (offgoing nurse). Report included the following information SBAR and Kardex.

## 2020-06-26 LAB
ANION GAP SERPL CALC-SCNC: 12 MMOL/L (ref 5–15)
BUN SERPL-MCNC: 11 MG/DL (ref 6–20)
BUN/CREAT SERPL: 16 (ref 12–20)
CALCIUM SERPL-MCNC: 9.3 MG/DL (ref 8.5–10.1)
CHLORIDE SERPL-SCNC: 107 MMOL/L (ref 97–108)
CO2 SERPL-SCNC: 20 MMOL/L (ref 21–32)
COMMENT, HOLDF: NORMAL
CREAT SERPL-MCNC: 0.68 MG/DL (ref 0.55–1.02)
GLUCOSE BLD STRIP.AUTO-MCNC: 152 MG/DL (ref 65–100)
GLUCOSE BLD STRIP.AUTO-MCNC: 155 MG/DL (ref 65–100)
GLUCOSE BLD STRIP.AUTO-MCNC: 156 MG/DL (ref 65–100)
GLUCOSE BLD STRIP.AUTO-MCNC: 162 MG/DL (ref 65–100)
GLUCOSE BLD STRIP.AUTO-MCNC: 87 MG/DL (ref 65–100)
GLUCOSE SERPL-MCNC: 132 MG/DL (ref 65–100)
MAGNESIUM SERPL-MCNC: 1.4 MG/DL (ref 1.6–2.4)
PHOSPHATE SERPL-MCNC: 2.6 MG/DL (ref 2.6–4.7)
POTASSIUM SERPL-SCNC: 3.4 MMOL/L (ref 3.5–5.1)
SAMPLES BEING HELD,HOLD: NORMAL
SERVICE CMNT-IMP: ABNORMAL
SERVICE CMNT-IMP: NORMAL
SODIUM SERPL-SCNC: 139 MMOL/L (ref 136–145)

## 2020-06-26 PROCEDURE — 84100 ASSAY OF PHOSPHORUS: CPT

## 2020-06-26 PROCEDURE — 65270000032 HC RM SEMIPRIVATE

## 2020-06-26 PROCEDURE — 82962 GLUCOSE BLOOD TEST: CPT

## 2020-06-26 PROCEDURE — 80048 BASIC METABOLIC PNL TOTAL CA: CPT

## 2020-06-26 PROCEDURE — 36415 COLL VENOUS BLD VENIPUNCTURE: CPT

## 2020-06-26 PROCEDURE — 94760 N-INVAS EAR/PLS OXIMETRY 1: CPT

## 2020-06-26 PROCEDURE — 83735 ASSAY OF MAGNESIUM: CPT

## 2020-06-26 PROCEDURE — 74011250636 HC RX REV CODE- 250/636: Performed by: HOSPITALIST

## 2020-06-26 RX ORDER — AMLODIPINE BESYLATE 5 MG/1
5 TABLET ORAL DAILY
Status: DISCONTINUED | OUTPATIENT
Start: 2020-06-27 | End: 2020-07-02 | Stop reason: HOSPADM

## 2020-06-26 RX ORDER — MAGNESIUM SULFATE HEPTAHYDRATE 40 MG/ML
2 INJECTION, SOLUTION INTRAVENOUS ONCE
Status: COMPLETED | OUTPATIENT
Start: 2020-06-26 | End: 2020-06-26

## 2020-06-26 RX ADMIN — Medication 10 ML: at 06:00

## 2020-06-26 RX ADMIN — MAGNESIUM SULFATE IN WATER 2 G: 40 INJECTION, SOLUTION INTRAVENOUS at 10:03

## 2020-06-26 RX ADMIN — Medication 10 ML: at 21:08

## 2020-06-26 NOTE — PROGRESS NOTES
1032: Sent message via [employer] to Dr. Vik Ba regarding order for tube feeding. PEG tube placed yesterday afternoon around 4:30pm.  Tube feed order states to start this morning but per IR, we should wait 24 hours post PEG tube placement to use. Are we starting tube feed now or waiting until this afternoon? Dr. Montalvo Bucyrus back immediately stating not to start tube feeding.

## 2020-06-26 NOTE — PROGRESS NOTES
BECKY:    -Referral sent to  at the [facility] via [employer], advising them that patient does have Medicaid but we do not have a copy of her insurance card. -CM obtained the Medicaid number from patients' daughter: [phone]  -CM explained to daughter Kathy Catalan that patient could go to the SNF with her Medicare Advantage covering hospice and her Medicaid covering her room and board. Kathy Catalan is in agreement with this. *Waiting for acceptance.     Kendrick GARCIAW, ACM

## 2020-06-26 NOTE — PROGRESS NOTES
NUTRITION  Recommendations:    1. Start feeds with: Jevity 1.5 @ 10ml/hr advanced 10ml/hr every 24hrs to 40ml/hr + 120ml flush q4hr    2. Replete electrolytes as needed. 3. Consider MVI goody bag and change to non-dextrose containing base since tube feeds to start. - at very least add 100mg thiamine and liquid MVI via PEG       Diet: NPO  Tube feeds: Jevity 1.5 @ 10ml/hr advanced 10ml/hr every 24hrs + 120ml flush q4hr  Meds: SSI, mag sulfate 2g, D5 NaCl w/ KCl @ 50ml/hr  Labs:  Lab Results   Component Value Date/Time    K 3.4 (L) 06/26/2020 04:50 AM      Lab Results   Component Value Date/Time    MG 1.4 (L) 06/26/2020 04:50 AM     Lab Results   Component Value Date/Time    PHOS 2.6 06/26/2020 04:50 AM     Chart reviewed for tube feet start. G-tube placement late in the afternoon yesterday since delay in getting in touch with daughter for consent. Peripheral IV access also obtained with IV started. Michael Speck in place and sitter at bedside to avoid pulling a lines/tubes. G-tube cleared for use today per IR orders. Recommend slow advancement of feeds since at very high risk for refeeding syndrome. Mag repleted and K+ infusing via IVF. Labs ordered for next couple days to monitor. Still may want to consider MVI goody bag. D5 providing 60g CHO, 204kcal.    Start tube feeds today: Jevity 1.5 @ 10ml/hr advanced 10ml/hr every 24hrs to 40ml/hr + 120ml flush q4hr. Provides: 960ml, 1440kcal, 61g protein, 730ml free fluid + 720ml flush = 1450ml fluid. Meets 100% energy and protein needs. Will also need liquid MVI to meet DRIs for vitamins/minerals. See full RD assessment from 6/25 for additional details, goals, and monitoring/evaluation.    Estimated Nutrition Needs:   Kcals/day: 9337 Kcals/day(1444-1520kcal)  Protein: 53 g(53-60g (1.4-1.6g/kg))  Fluid: 1500 ml(1ml/kcal)  Based On: Kcal/kg - specify (Comment)(38-40kcal/kg)  Weight Used: Actual wt(38kg)    Yulissa Johnson, RD [address] , Pager #[phone] or via [employer]

## 2020-06-26 NOTE — PROGRESS NOTES
[address]  [address], [address]  [phone]                GI PROGRESS NOTE      NAME:   Colonel Garrison   :    1931   MRN:    [id number]     Assessment/Plan   Dysphagia/ Concern of Esophageal cancer  - BA swallow 20:  In the distal esophagus, a circumferential mass extends over approximately 20 mm. This causes tight, persistent stenosis of the distal esophageal lumen, with  prolonged delay of oral contrast progression across the tumor and into the stomach. Esophagoesophageal reflux is associated. Smooth, lobulated projections of the tumor indent the superior-most stomach laterally. At the least, this is extrinsic mass effect. Direct invasion is likely. Inclusive of this, tumor extends over nearly 40 mm. -  CT chest, abdomen, pelvis 20:     1. Distal esophageal mass extending into the adjacent soft tissues and partially  narrowing the left inferior pulmonary vein     2. Left pleural effusion with question pleural-based metastasis     3. 9 mm nodule in the right upper lobe. It is partially calcified.     4. Indeterminate splenic lesion measuring 3.5 cm     5. 3.3 cm infrarenal abdominal aortic aneurysm with occlusion of the right  common iliac artery, internal iliac artery and external iliac artery with  reconstitution of the right common femoral artery by collaterals     6. 2.7 cm cystic pancreatic lesion     7. 3 cm right thyroid nodule  - Palliative care consulted: options of PEG tube with IR vs.may need esophageal stent vs PEG tube depending. -EGD 2020:  Esophagus: Granular scaly looking mucosal patches 2-4 mm in size were noted at 32 cm. A tight stricture with ulceration and friability was noted at 34 cm and was not traversed even with the Ultrathin scope.  Exact length and nature of the stricture could not be evaluated due to inability to traverse the lesion.   Stomach: not examined Duodenum/jejunum: not examined  Recommendation of IR PEG placement   -Patient received PEG with IR 6/25/2020 evening- PEG clamped at this time  -Follow recommendation of dietician/nutritionst on refeeding   -Case management in process of working on placement with daughter   Failure to thrive  Weight loss, unintentional  Anorexia  Alzheimer's  Will discuss with Dr. Sonali Lopez. GI with sign off- reconsult if need further assistance      Patient Active Problem List   Diagnosis Code    Type 2 diabetes mellitus without complication ([facility] [address] N42.2    Essential hypertension I10    Hypercholesterolemia E78.00    CVA, old, cognitive deficits I69.319    Insomnia G47.00    Cataracts, bilateral H26.9    Chronic diarrhea K52.9    ACP (advance care planning) Z71.89    Memory impairment R41.3    Chronic anemia D64.9    Depression F32.9    Late onset Alzheimer's disease without behavioral disturbance (HCC) G30.1, F02.80    Oropharyngeal dysphagia R13.12    Dysphagia R13.10    FTT (failure to thrive) in adult R62.7    Tumor of esophagus D49.0       Subjective:     Glenn Llamas is a 80 y.o.  female Patient resting in bed, pleasant. Sitter at bedside       Objective:     VITALS:   Last 24hrs VS reviewed since prior hospitalist progress note. Most recent are:  Visit Vitals  /73   Pulse 75   Temp 98 °F (36.7 °C)   Resp 16   Ht 5' 3\" (1.6 m)   Wt 37.6 kg (83 lb)   SpO2 97%   BMI 14.70 kg/m²     No intake or output data in the 24 hours ending 06/26/20 1034     PHYSICAL EXAM:  General appearance: cooperative, no distress, appears stated age. Frail appearance  Skin: Extremities and face reveal no rashes or jaundice  HEENT: Sclerae anicteric. Extra-occular muscles are intact. Cardiovascular: Regular rate and rhythm. Respiratory: Clear breath sounds with no wheezes, rales, or rhonchi. GI: Abdomen nondistended, soft, and nontender. Normal active bowel sounds. , PEG tube in placed with dry dressing and clamped.  Insertion site dry and intacy  Rectal: Deferred   Musculoskeletal: No edema of the lower legs.    Neurological: Patient is alert. Psychiatric: No agitation            Lab Data   Recent Results (from the past 12 hour(s))   GLUCOSE, POC    Collection Time: 06/26/20 12:03 AM   Result Value Ref Range    Glucose (POC) 87 65 - 100 mg/dL    Performed by Agustin Coronado    METABOLIC PANEL, BASIC    Collection Time: 06/26/20  4:50 AM   Result Value Ref Range    Sodium 139 136 - 145 mmol/L    Potassium 3.4 (L) 3.5 - 5.1 mmol/L    Chloride 107 97 - 108 mmol/L    CO2 20 (L) 21 - 32 mmol/L    Anion gap 12 5 - 15 mmol/L    Glucose 132 (H) 65 - 100 mg/dL    BUN 11 6 - 20 MG/DL    Creatinine 0.68 0.55 - 1.02 MG/DL    BUN/Creatinine ratio 16 12 - 20      GFR est AA >60 >60 ml/min/1.73m2    GFR est non-AA >60 >60 ml/min/1.73m2    Calcium 9.3 8.5 - 10.1 MG/DL   PHOSPHORUS    Collection Time: 06/26/20  4:50 AM   Result Value Ref Range    Phosphorus 2.6 2.6 - 4.7 MG/DL   MAGNESIUM    Collection Time: 06/26/20  4:50 AM   Result Value Ref Range    Magnesium 1.4 (L) 1.6 - 2.4 mg/dL   SAMPLES BEING HELD    Collection Time: 06/26/20  4:50 AM   Result Value Ref Range    SAMPLES BEING HELD 1lav     COMMENT        Add-on orders for these samples will be processed based on acceptable specimen integrity and analyte stability, which may vary by analyte. GLUCOSE, POC    Collection Time: 06/26/20  6:12 AM   Result Value Ref Range    Glucose (POC) 152 (H) 65 - 100 mg/dL    Performed by Agustin Coronado          Medications: Reviewed  Date/Time:  6/26/2020    I have personally reviewed the history and independently examined the patient. I have reviewed the chart and agree with the documentation recorded by the Mid Level Provider, including the assessment, treatment plan, and disposition. 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 Consult Oncology and Surgery, however, she is a poor surgical risk patient and has poor nutrition and may not tolerate chemotherapy.      Matty GOVEA Latrice King MD

## 2020-06-26 NOTE — PROGRESS NOTES
[facility]                                                                                          Hospitalist Progress Note  Adriano Palacios MD  Answering service: [phone] OR [phone] from in house phone        Date of Service:  2020  NAME:  Miguelito Overton  :  1931  MRN:  [id number]    This documentation was facilitated by a Voice Recognition software and may contain inadvertent typographical errors. Admission Summary: This is an 63-year-old female with a significant history of dementia, diabetes, hypertension, hyper lipidemia, stroke, depression and anemia was brought to the ED by her daughter for failure to thrive. Patient has dysphagia to solid food tolerating only liquids. She reportedly had a negative endoscopy few months back. 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 Assessment & Plan:        Invasive squamous cell carcinoma    Severe protein caloric malnutrition   Body mass index is 14.7 kg/m². -EGD showed:Granular scaly looking mucosal patches 2-4 mm in size were noted at 32 cm. A tight stricture with ulceration and friability was noted at 34 cm and was not traversed even with the Ultrathin scope. Exact length and nature of the stricture could not be evaluated due to inability to traverse the lesion. -s/p G tube placement-will start tube feeds today and advance as tolerated  -Not a candidate for any palliative treatments per hem/onc  -discussed with daughter       Advanced dementia with failure to thrive  -Reorient. Fall precaution  -Resume  Remeron     Hypertension- BP now trending up,will start amlodipine. Hypomagnesemia: replete as needed      Dehydration. hyponatremia resolved.       DM  -A1 c is at goal for her age, 7.5 in 2020  -Monitor glucose once tube feeds at started  D/c dextrose fluids today       Patient's Baseline: Ambulates with walking  DVT ppx: scd  Code status: DNR. Disposition: facility with hospice     Hospital Problems  Date Reviewed: 6/2/2020          Codes Class Noted POA    Dysphagia ICD-10-CM: R13.10  ICD-9-CM: 787.20  6/22/2020 Unknown        FTT (failure to thrive) in adult ICD-10-CM: R62.7  ICD-9-CM: 783.7  6/22/2020 Unknown        Tumor of esophagus ICD-10-CM: D49.0  ICD-9-CM: 239.0  6/22/2020 Unknown                Review of Systems:   Review of systems not obtained due to patient factors. Vital Signs:    Last 24hrs VS reviewed since prior progress note. Most recent are:  Visit Vitals  /80   Pulse 73   Temp 97.9 °F (36.6 °C)   Resp 16   Ht 5' 3\" (1.6 m)   Wt 37.6 kg (83 lb)   SpO2 97%   BMI 14.70 kg/m²         Intake/Output Summary (Last 24 hours) at 6/26/2020 1902  Last data filed at 6/26/2020 1750  Gross per 24 hour   Intake 120 ml   Output 400 ml   Net -280 ml        Physical Examination:             Constitutional:  No acute distress. Pleasantly confused,elderly patient   HEENT:  Atraumatic. Oral mucosa moist,. Non icteric sclera. No pallor. Resp:  clear to auscultation b/l,no wheezing       CV:  Regular rhythm, normal rate    GI:  Soft, non distended, non tender. normoactive bowel sounds        Musculoskeletal:  No LE edema    Neurologic:  Mental status: Alert. Pleasantly confused.   Cranial nerves II-XII : WNL  Motor exam:Moves all extremities symmetrically              Data Review:    Review and/or order of clinical lab test  Review and/or order of tests in the medicine section of CPT      Labs:     Recent Labs     06/25/20  0951   WBC 8.1   HGB 10.7*   HCT 33.5*        Recent Labs     06/26/20  0450 06/25/20  0951    139   K 3.4* 3.3*    108   CO2 20* 19*   BUN 11 12   CREA 0.68 0.53*   * 79   CA 9.3 9.2   MG 1.4* 1.4*   PHOS 2.6  --      No results for input(s): ALT, AP, TBIL, TBILI, TP, ALB, GLOB, GGT, AML, LPSE in the last 72 hours. No lab exists for component: SGOT, GPT, AMYP, HLPSE  No results for input(s): INR, PTP, APTT, INREXT, INREXT in the last 72 hours. No results for input(s): FE, TIBC, PSAT, FERR in the last 72 hours. Lab Results   Component Value Date/Time    Folate 3.8 05/06/2020 09:09 AM      No results for input(s): PH, PCO2, PO2 in the last 72 hours. No results for input(s): CPK, CKNDX, TROIQ in the last 72 hours.     No lab exists for component: CPKMB  Lab Results   Component Value Date/Time    Cholesterol, total 116 05/06/2020 09:09 AM    HDL Cholesterol 43 05/06/2020 09:09 AM    LDL, calculated 56 05/06/2020 09:09 AM    Triglyceride 84 05/06/2020 09:09 AM    CHOL/HDL Ratio 3.6 09/19/2012 03:30 AM     Lab Results   Component Value Date/Time    Glucose (POC) 156 (H) 06/26/2020 06:10 PM    Glucose (POC) 162 (H) 06/26/2020 12:06 PM    Glucose (POC) 152 (H) 06/26/2020 06:12 AM    Glucose (POC) 87 06/26/2020 12:03 AM    Glucose (POC) 74 06/25/2020 05:44 PM     Lab Results   Component Value Date/Time    Color YELLOW/STRAW 12/02/2019 06:03 AM    Appearance CLOUDY (A) 12/02/2019 06:03 AM    Specific gravity 1.017 12/02/2019 06:03 AM    pH (UA) 5.5 12/02/2019 06:03 AM    Protein 30 (A) 12/02/2019 06:03 AM    Glucose NEGATIVE  12/02/2019 06:03 AM    Ketone NEGATIVE  12/02/2019 06:03 AM    Bilirubin NEGATIVE  12/02/2019 06:03 AM    Urobilinogen 1.0 12/02/2019 06:03 AM    Nitrites NEGATIVE  12/02/2019 06:03 AM    Leukocyte Esterase SMALL (A) 12/02/2019 06:03 AM    Epithelial cells FEW 12/02/2019 06:03 AM    Bacteria NEGATIVE  12/02/2019 06:03 AM    WBC 0-4 12/02/2019 06:03 AM    RBC 0-5 12/02/2019 06:03 AM         Medications Reviewed:     Current Facility-Administered Medications   Medication Dose Route Frequency    [START ON 6/27/2020] amLODIPine (NORVASC) tablet 5 mg  5 mg Per G Tube DAILY    sodium chloride (NS) flush 5-40 mL  5-40 mL IntraVENous Q8H    sodium chloride (NS) flush 5-40 mL  5-40 mL IntraVENous PRN    sodium chloride (NS) flush 5-40 mL  5-40 mL IntraVENous Q8H    sodium chloride (NS) flush 5-40 mL  5-40 mL IntraVENous PRN    insulin lispro (HUMALOG) injection   SubCUTAneous Q6H    glucose chewable tablet 16 g  4 Tab Oral PRN    glucagon (GLUCAGEN) injection 1 mg  1 mg IntraMUSCular PRN    dextrose 10% infusion 0-250 mL  0-250 mL IntraVENous PRN     ______________________________________________________________________  EXPECTED LENGTH OF STAY: 3d 16h  ACTUAL LENGTH OF STAY:          4                 Florentin Maldonado MD

## 2020-06-26 NOTE — PROGRESS NOTES
Bedside shift change report given to Breanna Huizar RN (oncoming nurse) by Sebastian Rob RN (offgoing nurse). Report included the following information SBAR, Kardex, Intake/Output and MAR.

## 2020-06-26 NOTE — CONSULTS
[facility] [place] [address] [address]  W: [phone]  F: [phone]    Reason for Visit:   Rain Grimes is a 80 y.o. female who is seen in consultation at the request of Dr. Marlene Martinez for evaluation of Squamous cell carcinoma of the Esophagus. History of Present Illness:   Patient is a frail 80 y.o. female admitted 2020 with progressive dysphagia to solids/ failure to thrive . She has advanced dementia and does not provide much information. History obtained by chart review. Barium swallow showed a distal esophageal mass and stenosis. CT scans showed the mass, left pleural effusion, 9 mm RUL nodule splenic lesion a, cystic pancreatic lesion. She had an EGD on 2020 which showed a tight stricture with ulceration and friability was noted at 34 cm and was not traversed even with the Ultrathin scope- biopsy consistent with SCC of esophagus. Due to her frailty hospice has been considered and she had a gastrostomy tube placed on 2020. Oncology is consulted for an opinion    She is frail, denies pain, has not been eating/ drinking or having any emesis.        Past Medical History:   Diagnosis Date    Anemia     Depression     Diabetes ([address]     Hypercholesterolemia     Hypertension     Stroke ([facility] [address] 2012    CVA; left sided weakness      Past Surgical History:   Procedure Laterality Date    IR INSERT GASTROSTOMY TUBE PERC  2020      Social History     Tobacco Use    Smoking status: Former Smoker     Packs/day: 0.25     Last attempt to quit: 2012     Years since quittin.4    Smokeless tobacco: Never Used   Substance Use Topics    Alcohol use: No     Alcohol/week: 0.0 standard drinks      Family History   Problem Relation Age of Onset    No Known Problems Mother     No Known Problems Father      Current Facility-Administered Medications   Medication Dose Route Frequency    dextrose 5% - 0.9% NaCl with KCl 20 mEq/L infusion  50 mL/hr IntraVENous CONTINUOUS    sodium chloride (NS) flush 5-40 mL  5-40 mL IntraVENous Q8H    sodium chloride (NS) flush 5-40 mL  5-40 mL IntraVENous PRN    sodium chloride (NS) flush 5-40 mL  5-40 mL IntraVENous Q8H    sodium chloride (NS) flush 5-40 mL  5-40 mL IntraVENous PRN    insulin lispro (HUMALOG) injection   SubCUTAneous Q6H    glucose chewable tablet 16 g  4 Tab Oral PRN    glucagon (GLUCAGEN) injection 1 mg  1 mg IntraMUSCular PRN    dextrose 10% infusion 0-250 mL  0-250 mL IntraVENous PRN      No Known Allergies     Review of Systems: A complete review of systems limited     Physical Exam:     Visit Vitals  /80   Pulse 73   Temp 97.9 °F (36.6 °C)   Resp 16   Ht 5' 3\" (1.6 m)   Wt 83 lb (37.6 kg)   SpO2 97%   BMI 14.70 kg/m²     ECOG PS: 4  General: No distress, confused, not communicative, frail  Eyes: PERRLA, anicteric sclerae  HENT: Atraumatic, OP clear  Neck: Supple  Lymphatic: No cervical, supraclavicular, or inguinal adenopathy  Respiratory: normal respiratory effort  CV: Normal rate  GI: Soft, nontender, G tube  MS: Digits without clubbing or cyanosis. Skin: No rashes, ecchymoses  Psych: Alert, not oriented    Results:     Lab Results   Component Value Date/Time    WBC 8.1 06/25/2020 09:51 AM    HGB 10.7 (L) 06/25/2020 09:51 AM    HCT 33.5 (L) 06/25/2020 09:51 AM    PLATELET 774 88/35/9840 09:51 AM    MCV 90.8 06/25/2020 09:51 AM    ABS.  NEUTROPHILS 7.0 06/25/2020 09:51 AM     Lab Results   Component Value Date/Time    Sodium 139 06/26/2020 04:50 AM    Potassium 3.4 (L) 06/26/2020 04:50 AM    Chloride 107 06/26/2020 04:50 AM    CO2 20 (L) 06/26/2020 04:50 AM    Glucose 132 (H) 06/26/2020 04:50 AM    BUN 11 06/26/2020 04:50 AM    Creatinine 0.68 06/26/2020 04:50 AM    GFR est AA >60 06/26/2020 04:50 AM    GFR est non-AA >60 06/26/2020 04:50 AM    Calcium 9.3 06/26/2020 04:50 AM    Glucose (POC) 162 (H) 06/26/2020 12:06 PM     Lab Results   Component Value Date/Time Bilirubin, total 0.4 06/23/2020 02:39 AM    ALT (SGPT) 10 (L) 06/23/2020 02:39 AM    Alk. phosphatase 58 06/23/2020 02:39 AM    Protein, total 6.7 06/23/2020 02:39 AM    Albumin 2.6 (L) 06/23/2020 02:39 AM    Globulin 4.1 (H) 06/23/2020 02:39 AM       CT scans reviewed   Records reviewed and summarized above. Pathology report(s) reviewed above. Radiology report(s) reviewed above. Assessment:   1) SCC of distal Esophagus    T3N0M1? Atleast locally advanced  Left pleural based lesion may be metastatic  She is frail with an ECOG of 4  Will not tolerate of meaningfully benefit from any palliative treatments  Palliative RT may be poorly tolerated as well  Hence agree with hospice- spoke to daughter Laurel Ferris who is in agreement    2) Dysphagia and Protein calorie malnutrition    3) Dementia    4) Goals of care  Daughter is comfortable with Hospice      Plan:     · Agree with Hospice    I appreciate the opportunity to participate in Ms. Lindy Stewart's care.     Signed By: Vincenzo Aaron MD

## 2020-06-27 LAB
ANION GAP SERPL CALC-SCNC: 7 MMOL/L (ref 5–15)
BUN SERPL-MCNC: 9 MG/DL (ref 6–20)
BUN/CREAT SERPL: 14 (ref 12–20)
CALCIUM SERPL-MCNC: 9.6 MG/DL (ref 8.5–10.1)
CHLORIDE SERPL-SCNC: 106 MMOL/L (ref 97–108)
CO2 SERPL-SCNC: 26 MMOL/L (ref 21–32)
CREAT SERPL-MCNC: 0.63 MG/DL (ref 0.55–1.02)
GLUCOSE BLD STRIP.AUTO-MCNC: 171 MG/DL (ref 65–100)
GLUCOSE BLD STRIP.AUTO-MCNC: 183 MG/DL (ref 65–100)
GLUCOSE BLD STRIP.AUTO-MCNC: 184 MG/DL (ref 65–100)
GLUCOSE SERPL-MCNC: 168 MG/DL (ref 65–100)
MAGNESIUM SERPL-MCNC: 1.8 MG/DL (ref 1.6–2.4)
PHOSPHATE SERPL-MCNC: 1.6 MG/DL (ref 2.6–4.7)
POTASSIUM SERPL-SCNC: 3.1 MMOL/L (ref 3.5–5.1)
SERVICE CMNT-IMP: ABNORMAL
SODIUM SERPL-SCNC: 139 MMOL/L (ref 136–145)

## 2020-06-27 PROCEDURE — 80048 BASIC METABOLIC PNL TOTAL CA: CPT

## 2020-06-27 PROCEDURE — 36415 COLL VENOUS BLD VENIPUNCTURE: CPT

## 2020-06-27 PROCEDURE — 83735 ASSAY OF MAGNESIUM: CPT

## 2020-06-27 PROCEDURE — 74011250637 HC RX REV CODE- 250/637: Performed by: HOSPITALIST

## 2020-06-27 PROCEDURE — 74011250636 HC RX REV CODE- 250/636: Performed by: HOSPITALIST

## 2020-06-27 PROCEDURE — 65270000032 HC RM SEMIPRIVATE

## 2020-06-27 PROCEDURE — 82962 GLUCOSE BLOOD TEST: CPT

## 2020-06-27 PROCEDURE — 84100 ASSAY OF PHOSPHORUS: CPT

## 2020-06-27 RX ORDER — HEPARIN SODIUM 5000 [USP'U]/ML
5000 INJECTION, SOLUTION INTRAVENOUS; SUBCUTANEOUS EVERY 12 HOURS
Status: DISCONTINUED | OUTPATIENT
Start: 2020-06-27 | End: 2020-07-02 | Stop reason: HOSPADM

## 2020-06-27 RX ORDER — SODIUM,POTASSIUM PHOSPHATES 280-250MG
1 POWDER IN PACKET (EA) ORAL 2 TIMES DAILY
Status: DISCONTINUED | OUTPATIENT
Start: 2020-06-27 | End: 2020-07-02 | Stop reason: HOSPADM

## 2020-06-27 RX ADMIN — HEPARIN SODIUM 5000 UNITS: 5000 INJECTION INTRAVENOUS; SUBCUTANEOUS at 15:36

## 2020-06-27 RX ADMIN — AMLODIPINE BESYLATE 5 MG: 5 TABLET ORAL at 09:57

## 2020-06-27 RX ADMIN — POTASSIUM & SODIUM PHOSPHATES POWDER PACK 280-160-250 MG 1 PACKET: 280-160-250 PACK at 17:36

## 2020-06-27 RX ADMIN — Medication 10 ML: at 15:37

## 2020-06-27 RX ADMIN — Medication 10 ML: at 06:00

## 2020-06-27 RX ADMIN — POTASSIUM & SODIUM PHOSPHATES POWDER PACK 280-160-250 MG 1 PACKET: 280-160-250 PACK at 09:57

## 2020-06-27 NOTE — PROGRESS NOTES
[facility]                                                                                          Hospitalist Progress Note  Long Gutierrez MD  Answering service: [phone] OR [phone] from in house phone        Date of Service:  2020  NAME:  Janeth Smith  :  1931  MRN:  [id number]    This documentation was facilitated by a Voice Recognition software and may contain inadvertent typographical errors. Admission Summary: This is an 26-year-old female with a significant history of dementia, diabetes, hypertension, hyper lipidemia, stroke, depression and anemia was brought to the ED by her daughter for failure to thrive. Patient has dysphagia to solid food tolerating only liquids. She reportedly had a negative endoscopy few months back. Interval history / Subjective: Follow-up for dysphagia, esophageal stricture due to tumor likely cancerous     Difficult to obtain history due to dementia    Appears comfortable. Discussed with RN,tolerating meds and food so far. 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 -EGD showed:Granular scaly looking mucosal patches 2-4 mm in size were noted at 32 cm. A tight stricture with ulceration and friability was noted at 34 cm and was not traversed even with the Ultrathin scope. Exact length and nature of the stricture could not be evaluated due to inability to traverse the lesion. -s/p G tube placement-on tube feeds - advance the rate today- monitor for refeeding syndrome-daily lytes  -Not a candidate for any palliative treatments per hem/onc  -discussed with daughter-plan for  Hospice at discharge at facility       Advanced dementia with failure to thrive  -Reorient. Fall precaution  -Resumed  Remeron     Hypertension- BP better on amlodipine    Hypomagnesemia: replete as needed  Hypokalemia and hypophosphatemia: added neutra phos powder      Dehydration. hyponatremia resolved. DM  -A1 c is at goal for her age, 7.5 in 5/2020  -blood glucose 180s - acceptable range       Patient's Baseline: Ambulates with walking  DVT ppx: heaprin  Code status: DNR. Disposition: facility with hospice     Hospital Problems  Date Reviewed: 6/2/2020          Codes Class Noted POA    Dysphagia ICD-10-CM: R13.10  ICD-9-CM: 787.20  6/22/2020 Unknown        FTT (failure to thrive) in adult ICD-10-CM: R62.7  ICD-9-CM: 783.7  6/22/2020 Unknown        Tumor of esophagus ICD-10-CM: D49.0  ICD-9-CM: 239.0  6/22/2020 Unknown                Review of Systems:   Review of systems not obtained due to patient factors. Vital Signs:    Last 24hrs VS reviewed since prior progress note. Most recent are:  Visit Vitals  /78 (BP 1 Location: Right arm, BP Patient Position: At rest)   Pulse 80   Temp 97.4 °F (36.3 °C)   Resp 15   Ht 5' 3\" (1.6 m)   Wt 40.4 kg (89 lb 1.1 oz)   SpO2 98%   BMI 15.78 kg/m²         Intake/Output Summary (Last 24 hours) at 6/27/2020 1433  Last data filed at 6/27/2020 1000  Gross per 24 hour   Intake 610 ml   Output 200 ml   Net 410 ml        Physical Examination:             Constitutional:  No acute distress. Pleasantly confused,elderly patient   HEENT:  Atraumatic. Oral mucosa moist,. Non icteric sclera. No pallor. Resp:  clear to auscultation b/l       CV:  Regular rhythm, normal rate    GI:  Soft, non distended, non tender. normoactive bowel sounds        Musculoskeletal:  No LE edema    Neurologic:  Mental status: Alert,awake -oriented to herself. Pleasantly confused.   Motor exam:Moves all extremities              Data Review:    Review and/or order of clinical lab test  Review and/or order of tests in the medicine section of CPT      Labs:     Recent Labs     06/25/20  0951   WBC 8.1   HGB 10.7*   HCT 33.5*        Recent Labs     06/27/20  0435 06/26/20  0450 06/25/20  0951    139 139 K 3.1* 3.4* 3.3*    107 108   CO2 26 20* 19*   BUN 9 11 12   CREA 0.63 0.68 0.53*   * 132* 79   CA 9.6 9.3 9.2   MG 1.8 1.4* 1.4*   PHOS 1.6* 2.6  --      No results for input(s): ALT, AP, TBIL, TBILI, TP, ALB, GLOB, GGT, AML, LPSE in the last 72 hours. No lab exists for component: SGOT, GPT, AMYP, HLPSE  No results for input(s): INR, PTP, APTT, INREXT, INREXT in the last 72 hours. No results for input(s): FE, TIBC, PSAT, FERR in the last 72 hours. Lab Results   Component Value Date/Time    Folate 3.8 05/06/2020 09:09 AM      No results for input(s): PH, PCO2, PO2 in the last 72 hours. No results for input(s): CPK, CKNDX, TROIQ in the last 72 hours.     No lab exists for component: CPKMB  Lab Results   Component Value Date/Time    Cholesterol, total 116 05/06/2020 09:09 AM    HDL Cholesterol 43 05/06/2020 09:09 AM    LDL, calculated 56 05/06/2020 09:09 AM    Triglyceride 84 05/06/2020 09:09 AM    CHOL/HDL Ratio 3.6 09/19/2012 03:30 AM     Lab Results   Component Value Date/Time    Glucose (POC) 184 (H) 06/27/2020 11:16 AM    Glucose (POC) 183 (H) 06/27/2020 05:59 AM    Glucose (POC) 155 (H) 06/26/2020 10:48 PM    Glucose (POC) 156 (H) 06/26/2020 06:10 PM    Glucose (POC) 162 (H) 06/26/2020 12:06 PM     Lab Results   Component Value Date/Time    Color YELLOW/STRAW 12/02/2019 06:03 AM    Appearance CLOUDY (A) 12/02/2019 06:03 AM    Specific gravity 1.017 12/02/2019 06:03 AM    pH (UA) 5.5 12/02/2019 06:03 AM    Protein 30 (A) 12/02/2019 06:03 AM    Glucose NEGATIVE  12/02/2019 06:03 AM    Ketone NEGATIVE  12/02/2019 06:03 AM    Bilirubin NEGATIVE  12/02/2019 06:03 AM    Urobilinogen 1.0 12/02/2019 06:03 AM    Nitrites NEGATIVE  12/02/2019 06:03 AM    Leukocyte Esterase SMALL (A) 12/02/2019 06:03 AM    Epithelial cells FEW 12/02/2019 06:03 AM    Bacteria NEGATIVE  12/02/2019 06:03 AM    WBC 0-4 12/02/2019 06:03 AM    RBC 0-5 12/02/2019 06:03 AM         Medications Reviewed:     Current Facility-Administered Medications   Medication Dose Route Frequency    potassium, sodium phosphates (NEUTRA-PHOS) packet 1 Packet  1 Packet Per G Tube BID    amLODIPine (NORVASC) tablet 5 mg  5 mg Per G Tube DAILY    sodium chloride (NS) flush 5-40 mL  5-40 mL IntraVENous Q8H    sodium chloride (NS) flush 5-40 mL  5-40 mL IntraVENous PRN    sodium chloride (NS) flush 5-40 mL  5-40 mL IntraVENous Q8H    sodium chloride (NS) flush 5-40 mL  5-40 mL IntraVENous PRN    insulin lispro (HUMALOG) injection   SubCUTAneous Q6H    glucose chewable tablet 16 g  4 Tab Oral PRN    glucagon (GLUCAGEN) injection 1 mg  1 mg IntraMUSCular PRN    dextrose 10% infusion 0-250 mL  0-250 mL IntraVENous PRN     ______________________________________________________________________  EXPECTED LENGTH OF STAY: 3d 16h  ACTUAL LENGTH OF STAY:          5                 Mariela Garcia MD

## 2020-06-27 NOTE — PROGRESS NOTES
Bedside and Verbal shift change report given to Shanique Kothari RN (oncoming nurse) by Pooja Herron RN (offgoing nurse). Report included the following information SBAR, Kardex, ED Summary, Procedure Summary, Intake/Output and Recent Results.

## 2020-06-27 NOTE — ROUTINE PROCESS
Bedside shift change report given to [address] (oncoming nurse) by Elan White RN (offgoing nurse). Report included the following information SBAR and Kardex.

## 2020-06-27 NOTE — PROGRESS NOTES
Bedside and Verbal shift change report given to Yumikodario Flores RN (oncoming nurse) by Jefferson Cota RN (offgoing nurse). Report included the following information SBAR, Kardex, Procedure Summary, Intake/Output, MAR and Recent Results.

## 2020-06-28 LAB
ALBUMIN SERPL-MCNC: 2.5 G/DL (ref 3.5–5)
ANION GAP SERPL CALC-SCNC: 7 MMOL/L (ref 5–15)
BUN SERPL-MCNC: 11 MG/DL (ref 6–20)
BUN/CREAT SERPL: 20 (ref 12–20)
CALCIUM SERPL-MCNC: 9.1 MG/DL (ref 8.5–10.1)
CHLORIDE SERPL-SCNC: 105 MMOL/L (ref 97–108)
CO2 SERPL-SCNC: 25 MMOL/L (ref 21–32)
CREAT SERPL-MCNC: 0.56 MG/DL (ref 0.55–1.02)
GLUCOSE BLD STRIP.AUTO-MCNC: 180 MG/DL (ref 65–100)
GLUCOSE BLD STRIP.AUTO-MCNC: 181 MG/DL (ref 65–100)
GLUCOSE BLD STRIP.AUTO-MCNC: 191 MG/DL (ref 65–100)
GLUCOSE BLD STRIP.AUTO-MCNC: 191 MG/DL (ref 65–100)
GLUCOSE BLD STRIP.AUTO-MCNC: 192 MG/DL (ref 65–100)
GLUCOSE SERPL-MCNC: 160 MG/DL (ref 65–100)
MAGNESIUM SERPL-MCNC: 1.7 MG/DL (ref 1.6–2.4)
PHOSPHATE SERPL-MCNC: 2.3 MG/DL (ref 2.6–4.7)
POTASSIUM SERPL-SCNC: 3.2 MMOL/L (ref 3.5–5.1)
SERVICE CMNT-IMP: ABNORMAL
SODIUM SERPL-SCNC: 137 MMOL/L (ref 136–145)

## 2020-06-28 PROCEDURE — 83735 ASSAY OF MAGNESIUM: CPT

## 2020-06-28 PROCEDURE — 82962 GLUCOSE BLOOD TEST: CPT

## 2020-06-28 PROCEDURE — 74011250637 HC RX REV CODE- 250/637: Performed by: HOSPITALIST

## 2020-06-28 PROCEDURE — 74011250636 HC RX REV CODE- 250/636: Performed by: HOSPITALIST

## 2020-06-28 PROCEDURE — 80069 RENAL FUNCTION PANEL: CPT

## 2020-06-28 PROCEDURE — 36415 COLL VENOUS BLD VENIPUNCTURE: CPT

## 2020-06-28 PROCEDURE — 65270000032 HC RM SEMIPRIVATE

## 2020-06-28 RX ADMIN — HEPARIN SODIUM 5000 UNITS: 5000 INJECTION INTRAVENOUS; SUBCUTANEOUS at 16:37

## 2020-06-28 RX ADMIN — AMLODIPINE BESYLATE 5 MG: 5 TABLET ORAL at 10:12

## 2020-06-28 RX ADMIN — Medication 10 ML: at 14:30

## 2020-06-28 RX ADMIN — HEPARIN SODIUM 5000 UNITS: 5000 INJECTION INTRAVENOUS; SUBCUTANEOUS at 03:06

## 2020-06-28 RX ADMIN — POTASSIUM & SODIUM PHOSPHATES POWDER PACK 280-160-250 MG 1 PACKET: 280-160-250 PACK at 10:12

## 2020-06-28 RX ADMIN — POTASSIUM BICARBONATE 40 MEQ: 782 TABLET, EFFERVESCENT ORAL at 16:37

## 2020-06-28 RX ADMIN — POTASSIUM & SODIUM PHOSPHATES POWDER PACK 280-160-250 MG 1 PACKET: 280-160-250 PACK at 17:15

## 2020-06-28 RX ADMIN — Medication 10 ML: at 05:29

## 2020-06-28 NOTE — PROGRESS NOTES
Bedside and Verbal shift change report given to Krish Melendez RN (oncoming nurse) by Romy Sow RN (offgoing nurse). Report included the following information SBAR, Kardex, Procedure Summary, Intake/Output, MAR and Recent Results.

## 2020-06-28 NOTE — PROGRESS NOTES
Bedside and Verbal shift change report given to Lisa De La Fuente  (oncoming nurse) by Christiano Salvador (offgoing nurse). Report included the following information SBAR.

## 2020-06-28 NOTE — ROUTINE PROCESS
Bedside shift change report given to Treva Partida RN (oncoming nurse) by Archie Escobar RN (offgoing nurse). Report included the following information SBAR, Kardex, Intake/Output, MAR and Recent Results.

## 2020-06-28 NOTE — PROGRESS NOTES
[address] [facility] Adult  Hospitalist Group                                                                                          Hospitalist Progress Note  Christine Guadarrama MD  Answering service: [phone] OR [phone] from in house phone        Date of Service:  2020  NAME:  Jorge Coker  :  1931  MRN:  [id number]    This documentation was facilitated by a Voice Recognition software and may contain inadvertent typographical errors. Admission Summary: This is an 51-year-old female with a significant history of dementia, diabetes, hypertension, hyper lipidemia, stroke, depression and anemia was brought to the ED by her daughter for failure to thrive. Patient has dysphagia to solid food tolerating only liquids. She reportedly had a negative endoscopy few months back. 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 -EGD  showed:Granular scaly looking mucosal patches 2-4 mm in size were noted at 32 cm. A tight stricture with ulceration and friability was noted at 34 cm and was not traversed even with the Ultrathin scope. Exact length and nature of the stricture could not be evaluated due to inability to traverse the lesion. -s/p G tube placement by IR  -on tube feeds - advance the rate today- monitor for refeeding syndrome-daily lytes. Not sure why the patient had G tube placed   -Pathology shows invasive squamous cell carcinoma  -Not a candidate for any palliative treatments per hem/onc  -Noted plans for patient to go to SNF with hospice    Advanced dementia with failure to thrive  -Reorient. Fall precaution  -Resumed  Remeron  -Sitter in the room, not sure if that would prevent the patient to get accepted     Hypertension  - BP better on amlodipine    Hypomagnesemia: replete as needed    Hypokalemia and hypophosphatemia:  -Continue neutraphos     Dehydration. hyponatremia resolved. DM  -A1 c is at goal for her age, 7.5 in 5/2020  -blood glucose 180s - acceptable range    Tube feeding       Patient's Baseline: Ambulates with walking  DVT ppx: heaprin  Code status: DNR. Plan: Discharge to SNF with hospice when able, medically stable    Disposition: facility with hospice     Hospital Problems  Date Reviewed: 6/28/2020          Codes Class Noted POA    Dysphagia ICD-10-CM: R13.10  ICD-9-CM: 787.20  6/22/2020 Unknown        FTT (failure to thrive) in adult ICD-10-CM: R62.7  ICD-9-CM: 783.7  6/22/2020 Unknown        * (Principal) Tumor of esophagus ICD-10-CM: D49.0  ICD-9-CM: 239.0  6/22/2020 Yes                Review of Systems:   Review of systems not obtained due to patient factors. Vital Signs:    Last 24hrs VS reviewed since prior progress note. Most recent are:  Visit Vitals  /77 (BP 1 Location: Right arm, BP Patient Position: At rest)   Pulse 99   Temp 97.8 °F (36.6 °C)   Resp 16   Ht 5' 3\" (1.6 m)   Wt 40.8 kg (89 lb 15.2 oz)   SpO2 96%   BMI 15.93 kg/m²         Intake/Output Summary (Last 24 hours) at 6/28/2020 0839  Last data filed at 6/28/2020 0038  Gross per 24 hour   Intake 740 ml   Output    Net 740 ml        Physical Examination:             Constitutional:  No acute distress. Pleasantly confused,elderly patient   HEENT:  Atraumatic. Oral mucosa moist,. Non icteric sclera. No pallor. Resp:  clear to auscultation b/l       CV:  Regular rhythm, normal rate    GI:  Soft, non distended, non tender. normoactive bowel sounds        Musculoskeletal:  No LE edema    Neurologic:  Mental status: Alert,awake -oriented to herself. Pleasantly confused.   Motor exam:Moves all extremities              Data Review:    Review and/or order of clinical lab test  Review and/or order of tests in the medicine section of CPT      Labs:     Recent Labs 06/25/20  0951   WBC 8.1   HGB 10.7*   HCT 33.5*        Recent Labs     06/28/20  0311 06/27/20  0435 06/26/20  0450    139 139   K 3.2* 3.1* 3.4*    106 107   CO2 25 26 20*   BUN 11 9 11   CREA 0.56 0.63 0.68   * 168* 132*   CA 9.1 9.6 9.3   MG 1.7 1.8 1.4*   PHOS 2.3* 1.6* 2.6     Recent Labs     06/28/20  0311   ALB 2.5*     No results for input(s): INR, PTP, APTT, INREXT, INREXT in the last 72 hours. No results for input(s): FE, TIBC, PSAT, FERR in the last 72 hours. Lab Results   Component Value Date/Time    Folate 3.8 05/06/2020 09:09 AM      No results for input(s): PH, PCO2, PO2 in the last 72 hours. No results for input(s): CPK, CKNDX, TROIQ in the last 72 hours.     No lab exists for component: CPKMB  Lab Results   Component Value Date/Time    Cholesterol, total 116 05/06/2020 09:09 AM    HDL Cholesterol 43 05/06/2020 09:09 AM    LDL, calculated 56 05/06/2020 09:09 AM    Triglyceride 84 05/06/2020 09:09 AM    CHOL/HDL Ratio 3.6 09/19/2012 03:30 AM     Lab Results   Component Value Date/Time    Glucose (POC) 192 (H) 06/28/2020 05:18 AM    Glucose (POC) 180 (H) 06/28/2020 12:04 AM    Glucose (POC) 171 (H) 06/27/2020 04:54 PM    Glucose (POC) 184 (H) 06/27/2020 11:16 AM    Glucose (POC) 183 (H) 06/27/2020 05:59 AM     Lab Results   Component Value Date/Time    Color YELLOW/STRAW 12/02/2019 06:03 AM    Appearance CLOUDY (A) 12/02/2019 06:03 AM    Specific gravity 1.017 12/02/2019 06:03 AM    pH (UA) 5.5 12/02/2019 06:03 AM    Protein 30 (A) 12/02/2019 06:03 AM    Glucose NEGATIVE  12/02/2019 06:03 AM    Ketone NEGATIVE  12/02/2019 06:03 AM    Bilirubin NEGATIVE  12/02/2019 06:03 AM    Urobilinogen 1.0 12/02/2019 06:03 AM    Nitrites NEGATIVE  12/02/2019 06:03 AM    Leukocyte Esterase SMALL (A) 12/02/2019 06:03 AM    Epithelial cells FEW 12/02/2019 06:03 AM    Bacteria NEGATIVE  12/02/2019 06:03 AM    WBC 0-4 12/02/2019 06:03 AM    RBC 0-5 12/02/2019 06:03 AM         Medications Reviewed:     Current Facility-Administered Medications   Medication Dose Route Frequency    potassium, sodium phosphates (NEUTRA-PHOS) packet 1 Packet  1 Packet Per G Tube BID    heparin (porcine) injection 5,000 Units  5,000 Units SubCUTAneous Q12H    amLODIPine (NORVASC) tablet 5 mg  5 mg Per G Tube DAILY    sodium chloride (NS) flush 5-40 mL  5-40 mL IntraVENous Q8H    sodium chloride (NS) flush 5-40 mL  5-40 mL IntraVENous PRN    sodium chloride (NS) flush 5-40 mL  5-40 mL IntraVENous Q8H    sodium chloride (NS) flush 5-40 mL  5-40 mL IntraVENous PRN    insulin lispro (HUMALOG) injection   SubCUTAneous Q6H    glucose chewable tablet 16 g  4 Tab Oral PRN    glucagon (GLUCAGEN) injection 1 mg  1 mg IntraMUSCular PRN    dextrose 10% infusion 0-250 mL  0-250 mL IntraVENous PRN     ______________________________________________________________________  EXPECTED LENGTH OF STAY: 3d 16h  ACTUAL LENGTH OF STAY:          6                 Lynn Thrasher MD

## 2020-06-29 LAB
ALBUMIN SERPL-MCNC: 2.4 G/DL (ref 3.5–5)
ANION GAP SERPL CALC-SCNC: 6 MMOL/L (ref 5–15)
BUN SERPL-MCNC: 15 MG/DL (ref 6–20)
BUN/CREAT SERPL: 26 (ref 12–20)
CALCIUM SERPL-MCNC: 8.9 MG/DL (ref 8.5–10.1)
CHLORIDE SERPL-SCNC: 101 MMOL/L (ref 97–108)
CO2 SERPL-SCNC: 27 MMOL/L (ref 21–32)
CREAT SERPL-MCNC: 0.57 MG/DL (ref 0.55–1.02)
GLUCOSE BLD STRIP.AUTO-MCNC: 154 MG/DL (ref 65–100)
GLUCOSE BLD STRIP.AUTO-MCNC: 195 MG/DL (ref 65–100)
GLUCOSE BLD STRIP.AUTO-MCNC: 231 MG/DL (ref 65–100)
GLUCOSE BLD STRIP.AUTO-MCNC: 241 MG/DL (ref 65–100)
GLUCOSE SERPL-MCNC: 170 MG/DL (ref 65–100)
PHOSPHATE SERPL-MCNC: 2.3 MG/DL (ref 2.6–4.7)
POTASSIUM SERPL-SCNC: 3.7 MMOL/L (ref 3.5–5.1)
SERVICE CMNT-IMP: ABNORMAL
SODIUM SERPL-SCNC: 134 MMOL/L (ref 136–145)

## 2020-06-29 PROCEDURE — 74011636637 HC RX REV CODE- 636/637: Performed by: HOSPITALIST

## 2020-06-29 PROCEDURE — 74011250637 HC RX REV CODE- 250/637: Performed by: HOSPITALIST

## 2020-06-29 PROCEDURE — 82962 GLUCOSE BLOOD TEST: CPT

## 2020-06-29 PROCEDURE — 80069 RENAL FUNCTION PANEL: CPT

## 2020-06-29 PROCEDURE — 65270000032 HC RM SEMIPRIVATE

## 2020-06-29 PROCEDURE — 36415 COLL VENOUS BLD VENIPUNCTURE: CPT

## 2020-06-29 PROCEDURE — 74011250636 HC RX REV CODE- 250/636: Performed by: HOSPITALIST

## 2020-06-29 RX ORDER — QUETIAPINE FUMARATE 25 MG/1
25 TABLET, FILM COATED ORAL
Status: DISCONTINUED | OUTPATIENT
Start: 2020-06-29 | End: 2020-06-29

## 2020-06-29 RX ORDER — QUETIAPINE FUMARATE 25 MG/1
12.5 TABLET, FILM COATED ORAL 2 TIMES DAILY
Status: DISCONTINUED | OUTPATIENT
Start: 2020-06-29 | End: 2020-07-01

## 2020-06-29 RX ADMIN — INSULIN LISPRO 2 UNITS: 100 INJECTION, SOLUTION INTRAVENOUS; SUBCUTANEOUS at 23:40

## 2020-06-29 RX ADMIN — INSULIN LISPRO 2 UNITS: 100 INJECTION, SOLUTION INTRAVENOUS; SUBCUTANEOUS at 07:50

## 2020-06-29 RX ADMIN — HEPARIN SODIUM 5000 UNITS: 5000 INJECTION INTRAVENOUS; SUBCUTANEOUS at 17:14

## 2020-06-29 RX ADMIN — POTASSIUM & SODIUM PHOSPHATES POWDER PACK 280-160-250 MG 1 PACKET: 280-160-250 PACK at 17:15

## 2020-06-29 RX ADMIN — QUETIAPINE FUMARATE 12.5 MG: 25 TABLET ORAL at 17:15

## 2020-06-29 RX ADMIN — Medication 10 ML: at 14:00

## 2020-06-29 RX ADMIN — Medication 10 ML: at 23:41

## 2020-06-29 RX ADMIN — HEPARIN SODIUM 5000 UNITS: 5000 INJECTION INTRAVENOUS; SUBCUTANEOUS at 03:17

## 2020-06-29 RX ADMIN — POTASSIUM & SODIUM PHOSPHATES POWDER PACK 280-160-250 MG 1 PACKET: 280-160-250 PACK at 08:44

## 2020-06-29 RX ADMIN — AMLODIPINE BESYLATE 5 MG: 5 TABLET ORAL at 08:44

## 2020-06-29 NOTE — PROGRESS NOTES
Palliative Medicine Social Work      I spoke with pt dtr Hattie Toribio on phone. She will be visiting today at 1 pm. Left vmail with Vangie RN to help me get that set up. Julio Batista requested update from Dr. Sheppard Jeans. Was worried that patient wasn't recognizing her. She also confirmed plan for d/c to SNF with hospice. Will plan to see dtr today when she is visiting. Thank you for the opportunity to be involved in the care of Ms. Stewart and her family    Josiane Thacker LMSW, Supervisee in Social Work  Palliative Medicine   [phone]

## 2020-06-29 NOTE — PROGRESS NOTES
NUTRITION  Recommendations:    1. Advance tube feeds to goal: Jevity 1.5 @ 40ml/hr + 120ml flush q4hr  2. Consider long-acting insulin with BG trending >190mg/dl and hx of DM  3. Add 100mg thiamine x 7 days along with liquid MVI. Continue to replete electrolytes as needed. Diet: NPO  Tube feeds: Jevity 1.5 @ 30ml/hr advanced 10ml/hr every 24hrs to 40ml/hr+ 120ml flush q4hr  Meds: SSI, Neutraphos  Labs:  Lab Results   Component Value Date/Time    K 3.7 06/29/2020 03:09 AM      Lab Results   Component Value Date/Time    MG 1.7 06/28/2020 03:11 AM     Lab Results   Component Value Date/Time    PHOS 2.3 (L) 06/29/2020 03:09 AM     Chart reviewed for tube feet start. G-tube placed on 6/25 and tube feeds started on 6/26. Al[place] Jayy in place and sitter at bedside to avoid pulling a lines/tubes. Seen by oncology but plans for Hospice at facility when discharged. Tube feeds advanced over the weekend gradual with feeds currently running at 30ml/hr with no issues. Increase to goal of 40ml/hr. Phosphorus being repleted with low levels today, happy to see K+ and Mg WNL on last check. Would still benefit from 100mg thiamine x 7 days along with liquid MVI. Goal feeds will provide: 960ml, 1440kcal, 61g protein, 730ml free fluid + 720ml flush = 1450ml fluid. Meets 100% energy and protein needs. Will also need liquid MVI to meet DRIs for vitamins/minerals. Of note: BG elevated over 190mg/dl. May consider long-acting insulin but also with advanced age & plans for Hospice may not be indicated. See full RD assessment from 6/25 for additional details, goals, and monitoring/evaluation.    Estimated Nutrition Needs:   Kcals/day: 5030 Kcals/day(1444-1520kcal)  Protein: 53 g(53-60g (1.4-1.6g/kg))  Fluid: 1500 ml(1ml/kcal)  Based On: Kcal/kg - specify (Comment)(38-40kcal/kg)  Weight Used: Actual wt(38kg)    Ashlee Hunter, RD [address], Pager #[phone] or via [employer]

## 2020-06-29 NOTE — PROGRESS NOTES
Palliative Medicine Consult  Romain: [phone]    Patient Name: Alisha Garibay  YOB: 1931    Date of Initial Consult: 6/23/2020  Reason for Consult: Care decisions  Requesting Provider: Dr. Nita Campbell   Primary Care Physician: Jessica Vo DO     SUMMARY:   Alisha Garibay is a 80 y.o. with a past history of Alzheimer's dementia, CVA, diabetes, hyperlipidemia, who was admitted on 6/22/2020 from home with a diagnosis of difficulty swallowing. Current medical issues leading to Palliative Medicine involvement include: Care decisions. Chart reviewedpatient is a 49-year-old female with underlying Alzheimer's dementia who also status post CVA in 2012. She presents to the hospital with swallowing solids as well as significant weight loss of approximately 40 pounds over the last 5 months. Patient had barium swallow done which showed distal esophageal/GE junction carcinoma with tight, persistent stenosis of the distal esophagus. There is prolonged delay of thin liquid across the tumor into the stomach. CT scan showed distal esophageal mass extending into the adjacent soft tissues and partial narrowing in the left inferior pulmonary vein, left pleural effusion with questionable pleural-based metastasis, 9 mm nodule in the right upper lobe, indeterminate splenic lesion, 2.7 cm cystic pancreatic lesion. Patient has had significant decline over the last several months according to her daughter. She was barely even tolerating liquid prior to admission. Our team has been asked to see her for goals of care    Social historypatient is . Been living with her daughterSherricarol black since 2012. Fabián Forrest is an only child     PALLIATIVE DIAGNOSES:   1. GOC discussion  2. DNR discussion  3. Esophageal cancer  4. Dementia  5. Weight loss  6. Debility       PLAN:   1. Met with patient and daughter at the bedside. Reviewed the chart from the last several days.   Patient had percutaneous PEG placed for palliative intent onlytube feedings as well as will allow medication management for hospice given that patient has a near obstructing mass at the lower esophageal juncture. Still awaiting placement in facility with the support of hospice. Daughter had some questions and concerns that her mom \"seemed confused \"when she tried to talk with her on the phone. We had an extensive discussion about the natural progression of this cancer. We explained that her mom likely will fatigue more/sleep more/eventually not tolerate tube feedings. Daughter is tearful but understands. The hospice team will make sure patient is comfortable at the facility. It is not unusual for patients to be more fatigued and sleep much more and may even have some confusion given the overall poor prognosis. Daughter seem to have a good understanding of overall prognosis. She did ask about her son/patient's grandson visiting. I have asked her to talk with the bedside nurse/nurse manager about visitation. 2. Goals of care discharge to facility with hospice. [facility] management [facility]. 3. Advance care planning no advanced medical directive in the chart. Patient's daughter is legal next of kin and only child and hence would serve as medical POA. 4. CODE STATUS DNR remains intact. Complete a durable DO NOT RESUSCITATE form with daughter prior to discharge. 5. Symptom managementcurrently patient not experiencing any pain or other symptoms. Patient continues to deny pain  6. Psychosocial patient's daughter seems to be more accepting of the situation. She appreciates all the inpatient team support and will be meeting with [facility] hospice tomorrow. 7. Discussed with bedside nurse  8. Initial consult note routed to primary continuity provider and/or primary health care team members  9.  Communicated plan of care with: Palliative IDTRamesh 192 Team     GOALS OF CARE / TREATMENT PREFERENCES: GOALS OF CARE:  Patient/Health Care Proxy Stated Goals: Other (comment)(Likely home with hospice)    TREATMENT PREFERENCES:   Code Status: DNR    Advance Care Planning:  [x] The [facility] Interdisciplinary Team has updated the ACP Navigator with [address] and Patient Capacity      No flowsheet data found. Medical Interventions: Limited additional interventions     Other Instructions:   Artificially Administered Nutrition: Feeding tube for a defined trial period     Other:    As far as possible, the palliative care team has discussed with patient / health care proxy about goals of care / treatment preferences for patient. HISTORY:     History obtained from: Chart, patient's daughter, bedside nurse    CHIEF COMPLAINT: Cannot swallow    HPI/SUBJECTIVE:    The patient is:   [x] Verbal and participatory  [] Non-participatory due to:   Patient is awake. Pleasantly confused. Denies any pain    6/29patient remains pleasantly confused.   Denies any pain    Clinical Pain Assessment (nonverbal scale for severity on nonverbal patients):   Clinical Pain Assessment  Severity: 0     Activity (Movement): Lying quietly, normal position    Duration: for how long has pt been experiencing pain (e.g., 2 days, 1 month, years)  Frequency: how often pain is an issue (e.g., several times per day, once every few days, constant)     FUNCTIONAL ASSESSMENT:     Palliative Performance Scale (PPS):  PPS: 50       PSYCHOSOCIAL/SPIRITUAL SCREENING:     Palliative IDT has assessed this patient for cultural preferences / practices and a referral made as appropriate to needs (Cultural Services, Patient Advocacy, Ethics, etc.)    Any spiritual / Zoroastrianism concerns:  [] Yes /  [x] No    Caregiver Burnout:  [] Yes /  [x] No /  [] No Caregiver Present      Anticipatory grief assessment:   [x] Normal  / [] Maladaptive       ESAS Anxiety: Anxiety: 0    ESAS Depression: Depression: 0        REVIEW OF SYSTEMS:     Positive and pertinent negative findings in ROS are noted above in HPI. The following systems were [x] reviewed / [] unable to be reviewed as noted in HPI  Other findings are noted below. Systems: constitutional, ears/nose/mouth/throat, respiratory, gastrointestinal, genitourinary, musculoskeletal, integumentary, neurologic, psychiatric, endocrine. Positive findings noted below. Modified ESAS Completed by: provider   Fatigue: 4 Drowsiness: 1   Depression: 0 Pain: 0   Anxiety: 0 Nausea: 3   Anorexia: 4 Dyspnea: 0     Constipation: No     Stool Occurrence(s): 1        PHYSICAL EXAM:     From RN flowsheet:  Wt Readings from Last 3 Encounters:   06/29/20 92 lb 2.4 oz (41.8 kg)   12/05/19 96 lb (43.5 kg)   12/02/19 96 lb 9 oz (43.8 kg)     Blood pressure 128/86, pulse 79, temperature 97.5 °F (36.4 °C), resp. rate 18, height 5' 3\" (1.6 m), weight 92 lb 2.4 oz (41.8 kg), SpO2 98 %.     Pain Scale 1: Visual  Pain Intensity 1: 0                 Last bowel movement, if known:     Constitutional: Very thin, alert to person, interacting with her daughter  Eyes: pupils equal, anicteric  ENMT: no nasal discharge, moist mucous membranes  Cardiovascular: regular rhythm, distal pulses intact  Respiratory: breathing not labored, symmetric  Gastrointestinal: soft non-tender, +bowel sounds  Musculoskeletal: no deformity, no tenderness to palpation  Skin: warm, dry  Neurologic: following commands, moving all extremities  Psychiatric: full affect, no hallucinations, calm  Other:       HISTORY:     Principal Problem:    Tumor of esophagus (6/22/2020)    Active Problems:    Dysphagia (6/22/2020)      FTT (failure to thrive) in adult (6/22/2020)      Past Medical History:   Diagnosis Date    Anemia     Depression     Diabetes ([facility] [address]     Hypercholesterolemia     Hypertension     Stroke ([facility] [address] 09/2012    CVA; left sided weakness      Past Surgical History:   Procedure Laterality Date    IR INSERT GASTROSTOMY TUBE [facility]  6/25/2020      Family History Problem Relation Age of Onset    No Known Problems Mother     No Known Problems Father       History reviewed, no pertinent family history. Social History     Tobacco Use    Smoking status: Former Smoker     Packs/day: 0.25     Last attempt to quit: 2012     Years since quittin.4    Smokeless tobacco: Never Used   Substance Use Topics    Alcohol use: No     Alcohol/week: 0.0 standard drinks     No Known Allergies   Current Facility-Administered Medications   Medication Dose Route Frequency    potassium, sodium phosphates (NEUTRA-PHOS) packet 1 Packet  1 Packet Per G Tube BID    heparin (porcine) injection 5,000 Units  5,000 Units SubCUTAneous Q12H    amLODIPine (NORVASC) tablet 5 mg  5 mg Per G Tube DAILY    sodium chloride (NS) flush 5-40 mL  5-40 mL IntraVENous Q8H    sodium chloride (NS) flush 5-40 mL  5-40 mL IntraVENous PRN    sodium chloride (NS) flush 5-40 mL  5-40 mL IntraVENous Q8H    sodium chloride (NS) flush 5-40 mL  5-40 mL IntraVENous PRN    insulin lispro (HUMALOG) injection   SubCUTAneous Q6H    glucose chewable tablet 16 g  4 Tab Oral PRN    glucagon (GLUCAGEN) injection 1 mg  1 mg IntraMUSCular PRN    dextrose 10% infusion 0-250 mL  0-250 mL IntraVENous PRN          LAB AND IMAGING FINDINGS:     Lab Results   Component Value Date/Time    WBC 8.1 2020 09:51 AM    HGB 10.7 (L) 2020 09:51 AM    PLATELET 244  09:51 AM     Lab Results   Component Value Date/Time    Sodium 134 (L) 2020 03:09 AM    Potassium 3.7 2020 03:09 AM    Chloride 101 2020 03:09 AM    CO2 27 2020 03:09 AM    BUN 15 2020 03:09 AM    Creatinine 0.57 2020 03:09 AM    Calcium 8.9 2020 03:09 AM    Magnesium 1.7 2020 03:11 AM    Phosphorus 2.3 (L) 2020 03:09 AM      Lab Results   Component Value Date/Time    Alk.  phosphatase 58 2020 02:39 AM    Protein, total 6.7 2020 02:39 AM    Albumin 2.4 (L) 2020 03:09 AM Globulin 4.1 (H) 06/23/2020 02:39 AM     No results found for: INR, PTMR, PTP, PT1, PT2, APTT, INREXT, INREXT   No results found for: IRON, FE, TIBC, IBCT, PSAT, FERR   No results found for: PH, PCO2, PO2  No components found for: [place]   Lab Results   Component Value Date/Time     (H) 10/28/2012 12:30 PM    CK - MB 4.9 (H) 10/28/2012 12:30 PM                Total time: 35  Counseling / coordination time, spent as noted above: 30  > 50% counseling / coordination?: yes    Prolonged service was provided for  []30 min   []75 min in face to face time in the presence of the patient, spent as noted above. Time Start:   Time End:   Note: this can only be billed with 73071 (initial) or [id number] (follow up). If multiple start / stop times, list each separately.

## 2020-06-29 NOTE — PROGRESS NOTES
BECKY:    RUR: 16%    CM continuing to follow for discharge planning, discussed during rounds. CM still waiting for  at the [facility] to make a decision as to whether or not they can accept patient; message sent to them this morning requesting an answer. Referral sent to [facility], as this is the agency  utilizes. CM noted patient currently has a sitter due to pulling at her IV. Once patient is on bolus feeds, this will no longer be an issue. 11:20am:  at the [facility] does not have a bed today. Hopefully they will have one tomorrow/Wednesday. Daughter Fabián Forrest is meeting with [facility] tomorrow morning.     3:00pm: Response received from  at the [facility] that they are unable to confirm patients' medicaid. CM had provided them with the Medicaid number that was given to CM by the daughter. The SNF is concerned that patients' medicaid has lapsed. They have left a message for daughter Fabián Forrest to return their call to look into the matter further. If patient does still have Medicaid, they will be able to accept patient on Wednesday.    Melania GARCIAW, ACM

## 2020-06-29 NOTE — PROGRESS NOTES
[address] [facility] Adult  Hospitalist Group                                                                                          Hospitalist Progress Note  Roxy Shen MD  Answering service: [phone] OR [phone] from in house phone        Date of Service:  2020  NAME:  Ryan Ward  :  1931  MRN:  [id number]    This documentation was facilitated by a Voice Recognition software and may contain inadvertent typographical errors. Admission Summary: This is an 55-year-old female with a significant history of dementia, diabetes, hypertension, hyper lipidemia, stroke, depression and anemia was brought to the ED by her daughter for failure to thrive. Patient has dysphagia to solid food tolerating only liquids. She reportedly had a negative endoscopy few months back. Interval history / Subjective:   F/U for dysphagia     She has a sitter at bedside. Per RN,she is trying to get out of bed and pulls at G tube. 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 -EGD  showed:Granular scaly looking mucosal patches 2-4 mm in size were noted at 32 cm. A tight stricture with ulceration and friability was noted at 34 cm and was not traversed even with the Ultrathin scope. Exact length and nature of the stricture could not be evaluated due to inability to traverse the lesion. -s/p G tube placement by IR  for comfort care  -Not a candidate for treatment of cancer   -tolerating tube feeds  -plan for hospice at a facility at discharge    Advanced dementia with failure to thrive,behavioral distrubance  -Reorient. Fall precaution  -Resumed  Remeron  -will add low dose seroquel        Hypertension  - BP better on amlodipine    Hypomagnesemia: resolved      Hypokalemia and hypophosphatemia:  -Continue neutraphos     Dehydration. hyponatremia resolved.       DM  -A1 c is at goal for her age, 7.5 in 5/2020  -blood glucose 180s - acceptable range    Tube feeding       Patient's Baseline: Ambulates with walking  DVT ppx: heaprin  Code status: DNR. Plan: Discharge to SNF with hospice when able    Disposition: facility with hospice     Hospital Problems  Date Reviewed: 6/28/2020          Codes Class Noted POA    Dysphagia ICD-10-CM: R13.10  ICD-9-CM: 787.20  6/22/2020 Unknown        FTT (failure to thrive) in adult ICD-10-CM: R62.7  ICD-9-CM: 783.7  6/22/2020 Unknown        * (Principal) Tumor of esophagus ICD-10-CM: D49.0  ICD-9-CM: 239.0  6/22/2020 Yes                Review of Systems:   Review of systems not obtained due to patient factors. Vital Signs:    Last 24hrs VS reviewed since prior progress note. Most recent are:  Visit Vitals  /76   Pulse 86   Temp 97.5 °F (36.4 °C)   Resp 18   Ht 5' 3\" (1.6 m)   Wt 41.8 kg (92 lb 2.4 oz)   SpO2 98%   BMI 16.32 kg/m²         Intake/Output Summary (Last 24 hours) at 6/29/2020 1857  Last data filed at 6/29/2020 0844  Gross per 24 hour   Intake 390 ml   Output    Net 390 ml        Physical Examination:             Constitutional:  Confused,elderly patient,cachectic   HEENT:  Atraumatic. Oral mucosa moist,. Non icteric sclera. No pallor. Resp:  clear to auscultation b/l       CV:  Regular rhythm, normal rate    GI:  Soft, non distended, non tender. normoactive bowel sounds        Musculoskeletal:  No LE edema    Neurologic:  Mental status: Alert,awake -oriented to herself. Was trying to get out of bed todya  Motor exam:Moves all extremities              Data Review:    Review and/or order of clinical lab test  Review and/or order of tests in the medicine section of CPT      Labs:     No results for input(s): WBC, HGB, HCT, PLT, HGBEXT, HCTEXT, PLTEXT, HGBEXT, HCTEXT, PLTEXT in the last 72 hours.   Recent Labs     06/29/20  0309 06/28/20  0311 06/27/20  0435   * 137 139   K 3.7 3.2* 3.1*    105 106   CO2 27 25 26   BUN 15 11 9   CREA 0. 57 0.56 0.63   * 160* 168*   CA 8.9 9.1 9.6   MG  --  1.7 1.8   PHOS 2.3* 2.3* 1.6*     Recent Labs     06/29/20  0309 06/28/20  0311   ALB 2.4* 2.5*     No results for input(s): INR, PTP, APTT, INREXT, INREXT in the last 72 hours. No results for input(s): FE, TIBC, PSAT, FERR in the last 72 hours. Lab Results   Component Value Date/Time    Folate 3.8 05/06/2020 09:09 AM      No results for input(s): PH, PCO2, PO2 in the last 72 hours. No results for input(s): CPK, CKNDX, TROIQ in the last 72 hours.     No lab exists for component: CPKMB  Lab Results   Component Value Date/Time    Cholesterol, total 116 05/06/2020 09:09 AM    HDL Cholesterol 43 05/06/2020 09:09 AM    LDL, calculated 56 05/06/2020 09:09 AM    Triglyceride 84 05/06/2020 09:09 AM    CHOL/HDL Ratio 3.6 09/19/2012 03:30 AM     Lab Results   Component Value Date/Time    Glucose (POC) 195 (H) 06/29/2020 04:35 PM    Glucose (POC) 154 (H) 06/29/2020 11:26 AM    Glucose (POC) 231 (H) 06/29/2020 06:13 AM    Glucose (POC) 191 (H) 06/28/2020 11:12 PM    Glucose (POC) 181 (H) 06/28/2020 05:46 PM     Lab Results   Component Value Date/Time    Color YELLOW/STRAW 12/02/2019 06:03 AM    Appearance CLOUDY (A) 12/02/2019 06:03 AM    Specific gravity 1.017 12/02/2019 06:03 AM    pH (UA) 5.5 12/02/2019 06:03 AM    Protein 30 (A) 12/02/2019 06:03 AM    Glucose NEGATIVE  12/02/2019 06:03 AM    Ketone NEGATIVE  12/02/2019 06:03 AM    Bilirubin NEGATIVE  12/02/2019 06:03 AM    Urobilinogen 1.0 12/02/2019 06:03 AM    Nitrites NEGATIVE  12/02/2019 06:03 AM    Leukocyte Esterase SMALL (A) 12/02/2019 06:03 AM    Epithelial cells FEW 12/02/2019 06:03 AM    Bacteria NEGATIVE  12/02/2019 06:03 AM    WBC 0-4 12/02/2019 06:03 AM    RBC 0-5 12/02/2019 06:03 AM         Medications Reviewed:     Current Facility-Administered Medications   Medication Dose Route Frequency    QUEtiapine (SEROquel) tablet 12.5 mg  12.5 mg Oral BID    potassium, sodium phosphates (NEUTRA-PHOS) packet 1 Packet  1 Packet Per G Tube BID    heparin (porcine) injection 5,000 Units  5,000 Units SubCUTAneous Q12H    amLODIPine (NORVASC) tablet 5 mg  5 mg Per G Tube DAILY    sodium chloride (NS) flush 5-40 mL  5-40 mL IntraVENous Q8H    sodium chloride (NS) flush 5-40 mL  5-40 mL IntraVENous PRN    sodium chloride (NS) flush 5-40 mL  5-40 mL IntraVENous Q8H    sodium chloride (NS) flush 5-40 mL  5-40 mL IntraVENous PRN    insulin lispro (HUMALOG) injection   SubCUTAneous Q6H    glucose chewable tablet 16 g  4 Tab Oral PRN    glucagon (GLUCAGEN) injection 1 mg  1 mg IntraMUSCular PRN    dextrose 10% infusion 0-250 mL  0-250 mL IntraVENous PRN     ______________________________________________________________________  EXPECTED LENGTH OF STAY: 5d 9h  ACTUAL LENGTH OF STAY:          7                 Ann Marie Wayne MD

## 2020-06-29 NOTE — ROUTINE PROCESS
Perfect serve to Dr. Marlene Martinez: The sitter stated the patient has had about 6 soft to liquid stools today. I am wondering if we can try the Seroquel? She just keeps getting up and is more of a 2 assist. 
 
 
Dr. Marlene Martinez ordered Seroquel. 85 yo HFpEF (3/2018 ef 71%) s/p mitral clip in 8/2016, normal cors in 2016,  permanent a fib on pradaxa, htn, hld , SHERYL not on cpap a/w ADHF.     - She is much better compensated, with improved hf on exam.   - cont po torsemide  - continue to maintain strict I/Os, monitor daily weights, Cr, and K.     -rhc revealed mildly elev right and left heart pressures, with a suggestion of pre and post cap pulm htn, none of which is surprising.  hypoxic tendencies not well explained at present.  no sat run performed.  Meaningful right to left shunt unlikely.  She is for SHEREEN to further evaluate this  -vq negative.  For CT chest today to further evaluate hypoxia. sleep study and pfts as outpatient  -patient is considering the cardiomems.  This is going to be done as an outpatient.    - AF rates controlled overall, though still with some bradycardic tendencies  -monitor hr carefully    - Echo on 3/9: LVEF 70%, RVE with RVSD, severe LAE and SONNY, mild MS, severe TR and severe pulmonary HTN with an RVSP 82, and some degree of MR that is shadowed by the mitraclip.    - cont statin  - Further cardiac workup will depend on clinical course.   - All other workup per primary team. Will followup.

## 2020-06-29 NOTE — ROUTINE PROCESS
Bedside and Verbal shift change report given to Andrés Garcia (oncoming nurse) by Cristo Sow (offgoing nurse). Report included the following information SBAR, Kardex and Recent Results.

## 2020-06-30 LAB
ALBUMIN SERPL-MCNC: 2.3 G/DL (ref 3.5–5)
ANION GAP SERPL CALC-SCNC: 7 MMOL/L (ref 5–15)
BUN SERPL-MCNC: 12 MG/DL (ref 6–20)
BUN/CREAT SERPL: 22 (ref 12–20)
CALCIUM SERPL-MCNC: 8.8 MG/DL (ref 8.5–10.1)
CHLORIDE SERPL-SCNC: 100 MMOL/L (ref 97–108)
CO2 SERPL-SCNC: 29 MMOL/L (ref 21–32)
CREAT SERPL-MCNC: 0.54 MG/DL (ref 0.55–1.02)
GLUCOSE BLD STRIP.AUTO-MCNC: 192 MG/DL (ref 65–100)
GLUCOSE BLD STRIP.AUTO-MCNC: 197 MG/DL (ref 65–100)
GLUCOSE BLD STRIP.AUTO-MCNC: 229 MG/DL (ref 65–100)
GLUCOSE SERPL-MCNC: 208 MG/DL (ref 65–100)
PHOSPHATE SERPL-MCNC: 2.6 MG/DL (ref 2.6–4.7)
POTASSIUM SERPL-SCNC: 3.8 MMOL/L (ref 3.5–5.1)
SERVICE CMNT-IMP: ABNORMAL
SODIUM SERPL-SCNC: 136 MMOL/L (ref 136–145)

## 2020-06-30 PROCEDURE — 94760 N-INVAS EAR/PLS OXIMETRY 1: CPT

## 2020-06-30 PROCEDURE — 74011636637 HC RX REV CODE- 636/637: Performed by: HOSPITALIST

## 2020-06-30 PROCEDURE — 36415 COLL VENOUS BLD VENIPUNCTURE: CPT

## 2020-06-30 PROCEDURE — 65270000032 HC RM SEMIPRIVATE

## 2020-06-30 PROCEDURE — 82962 GLUCOSE BLOOD TEST: CPT

## 2020-06-30 PROCEDURE — 74011250637 HC RX REV CODE- 250/637: Performed by: HOSPITALIST

## 2020-06-30 PROCEDURE — 80069 RENAL FUNCTION PANEL: CPT

## 2020-06-30 PROCEDURE — 74011250636 HC RX REV CODE- 250/636: Performed by: HOSPITALIST

## 2020-06-30 RX ORDER — LANOLIN ALCOHOL/MO/W.PET/CERES
100 CREAM (GRAM) TOPICAL DAILY
Status: DISCONTINUED | OUTPATIENT
Start: 2020-07-01 | End: 2020-07-02 | Stop reason: HOSPADM

## 2020-06-30 RX ADMIN — HEPARIN SODIUM 5000 UNITS: 5000 INJECTION INTRAVENOUS; SUBCUTANEOUS at 05:03

## 2020-06-30 RX ADMIN — Medication 10 ML: at 14:30

## 2020-06-30 RX ADMIN — AMLODIPINE BESYLATE 5 MG: 5 TABLET ORAL at 08:37

## 2020-06-30 RX ADMIN — QUETIAPINE FUMARATE 12.5 MG: 25 TABLET ORAL at 08:37

## 2020-06-30 RX ADMIN — POTASSIUM & SODIUM PHOSPHATES POWDER PACK 280-160-250 MG 1 PACKET: 280-160-250 PACK at 08:37

## 2020-06-30 RX ADMIN — INSULIN LISPRO 2 UNITS: 100 INJECTION, SOLUTION INTRAVENOUS; SUBCUTANEOUS at 07:18

## 2020-06-30 RX ADMIN — POTASSIUM & SODIUM PHOSPHATES POWDER PACK 280-160-250 MG 1 PACKET: 280-160-250 PACK at 17:07

## 2020-06-30 RX ADMIN — QUETIAPINE FUMARATE 12.5 MG: 25 TABLET ORAL at 17:07

## 2020-06-30 RX ADMIN — HEPARIN SODIUM 5000 UNITS: 5000 INJECTION INTRAVENOUS; SUBCUTANEOUS at 16:04

## 2020-06-30 NOTE — ROUTINE PROCESS
Per Dr. Alfredo Moreno to try the patient without a sitter. Bed alarm placed and door is open. Will continue to monitor the patient.

## 2020-06-30 NOTE — PROGRESS NOTES
NUTRITION COMPLETE ASSESSMENT    RECOMMENDATIONS:   1. Switch to bolus feeds to reduce risk of pt pulling on G-tube:    - Jevity 1.5, 80ml bolus + 60ml flush pre/post bolus. Increase bolus by 40ml every 4hrs to goal bolus of 160ml.   - Goal: Jevity 1.5, 160ml bolus q4hr (6x/day) + 60ml flush pre/post bolus   - keep abdominal binder at all times    2. Consider long-acting insulin or \"meal-time\" insulin with each bolus feed   - BG >190mg/dl   - each feed will provide 34g CHO    Interventions/Plan:   Food/Nutrient Delivery:    (-)   (MVI, thiamine) Modify rate, concentration, composition, and schedule    Assessment:   Reason for Assessment: New Nutrition Support    Diet: NPO   Tube feeds: Jevity 1.5 @ 40ml/hr + 120ml flush q4hr  Nutritionally Significant Medications: [x] Reviewed & Includes: SSI, neutraphos, seroquel    Subjective: Staff Interviewed. Objective:  Pt admitted for dysphagia. PMHx: depression, DM, anemia, HTN, stroke, dementia. Lethargy and weakness with difficulty swallowing prior to admit. Mass of the distal esophagus with question of mets along with splenic lesion, pancreatic lesion and thyroid nodule. G-tube placed 6/25, feeds started 6/26 . Advanced to goal and tolerating per RN. Several looser bowel movements overnight but no other concerns. Sitter in place until today since pulling at lines and G-tube. Will transition to bolus feeds to allow time off pump. Goal feeds for discharge: Jevity 1.5, 160ml bolus q4hr (6x/day) + 60ml flush pre/post bolus. Provides same as current regimen: 960ml, 1440kcal, 61g protein, 730ml free fluid + 720ml flush = 1450ml fluid. Meets 100% energy and protein needs. Will also need liquid MVI to meet DRIs for vitamins/minerals. Severe wt loss of 40# over past 6 months. Only 72% IBW.   Nutrition Focused Physical Exam:  Muscle Loss: Evangel[place] Region Muscle Wasting-Severe  Clavicle Region Muscle Wasting-Severe  Fat Loss: Orbital Region Fat Loss-Severe  Cheek Region (Buccal Fat Pads) Fat Loss-Severe  Estimated Nutrition Needs:   Kcals/day: 3557 Kcals/day(1444-1520kcal)  Protein: 53 g(53-60g (1.4-1.6g/kg))  Fluid: 1500 ml(1ml/kcal)  Based On: Kcal/kg - specify (Comment)(38-40kcal/kg)  Weight Used: Actual wt(38kg)    Pt expected to meet estimated nutrient needs:  []   Yes     [x]  No [] Unable to predict at this time  Nutrition Diagnosis:   1. Malnutrition related to inadequate oral intake 2/2 dementia/esophageal mass as evidenced by severe wt loss of  33% x 5 months; muscle/fat wasting; <50% needs prior to admit; G-tube placement    Goals:     EN meeting at least 90% needs in 5-6 days; wt maintenance     Monitoring & Evaluation:    - Total energy intake, Enteral/parenteral nutrition intake, Vitamin intake   - Weight/weight change, Glucose profile    Previous Nutrition Goals Met:   Yes  Previous Recommendations:    No    Education & Discharge Needs:    [x] None Identified   [] Identified and addressed    [x] Participated in care plan, discharge planning, and/or interdisciplinary rounds        Nutrition Discharge Plan:   [] Too soon to determine  [x] Other: will need enteral feeds on d/c - spoke with case mgmt. Cultural, Congregational and ethnic food preferences identified: None    Skin Integrity: [x]Intact  []Other  Edema: [x]None []Other  Last BM: 6/30  Food Allergies: [x]None []Other  Diet Restrictions: Food Intolerance: Ensure  Cultural/Nondenominational Preference(s): None     Anthropometrics:    Weight Loss Metrics 6/30/2020 6/2/2020 12/5/2019 12/2/2019 9/3/2019 5/31/2019 11/13/2018   Today's Wt 88 lb 13.5 oz - 96 lb 96 lb 9 oz 100 lb 3.2 oz 100 lb 3.2 oz 100 lb   BMI 15.74 kg/m2 17.56 kg/m2 17.56 kg/m2 17.66 kg/m2 18.33 kg/m2 18.33 kg/m2 18.29 kg/m2      Weight Source: Bed  Height: 5' 3\" (160 cm),    Body mass index is 15.74 kg/m².      IBW : 52.2 kg (115 lb), % IBW (Calculated): 72.17 %   ,      Labs:    Lab Results   Component Value Date/Time    Sodium 136 06/30/2020 05:13 AM Potassium 3.8 06/30/2020 05:13 AM    Chloride 100 06/30/2020 05:13 AM    CO2 29 06/30/2020 05:13 AM    Glucose 208 (H) 06/30/2020 05:13 AM    BUN 12 06/30/2020 05:13 AM    Creatinine 0.54 (L) 06/30/2020 05:13 AM    Calcium 8.8 06/30/2020 05:13 AM    Magnesium 1.7 06/28/2020 03:11 AM    Phosphorus 2.6 06/30/2020 05:13 AM    Albumin 2.3 (L) 06/30/2020 05:13 AM     Lab Results   Component Value Date/Time    Hemoglobin A1c 7.5 (H) 05/06/2020 09:09 AM     Maximus Mcknight RD [facility], Pager #[phone] or via [employer]

## 2020-06-30 NOTE — ROUTINE PROCESS
Bedside shift change report given to Deanna Marcos RN (oncoming nurse) by Medina Puente RN (offgoing nurse). Report included the following information SBAR, Kardex, Intake/Output, MAR and Recent Results.

## 2020-06-30 NOTE — ROUTINE PROCESS
Bedside and Verbal shift change report given to Janeen Suárez (oncoming nurse) by Odin Roldan (offgoing nurse). Report included the following information SBAR, Kardex and Recent Results.

## 2020-06-30 NOTE — PROGRESS NOTES
RN flushed IV, which began leaking and bleeding around the site. Area above IV appears firm, possible infiltration. IV placed previously by PICC team following multiple failed attempts to place IV. IV placed by this RN, saline locked and wrapped to prevent pt pulling line.

## 2020-06-30 NOTE — PROGRESS NOTES
BECKY:    RUR: 19%    Call received from [facility] at the [facility] stating they have confirmed patient does not have Medicaid. They have spoken with the daughter Delores Bush to inform her of this, as Delores Bush was under the impression that she did. Delores Bush stated she would go ahead and apply now, but of course that would take 45-60 days at a minimum, to be approved. Without Medicaid, patient cannot go to a SNF with hospice. Her only option would be to go back home with hospice. Cm will call Delores Bush to discuss. 1:20pm: CM spoke with patients' daughter Delores Bush about the option of going home with hospice. Delores Bush agreed that was the only option at this time. She is concerned about how she will manage the peg tube, and CM informed her the dietician was working on simplifying the administration of it, as much as possible. CM also informed her that she would be taught how to administer the feedings, by the staff here at the hospital. Delores Bush will also have the support of hospice staff. CM will continue to follow. 2:30pm: CM spoke with dietician about patients' needs at home, now that plans have changed. Patient will need a home infusion/tube feed company to assist. CM sent a referral to [facility] to see if they can accept.      Manuel COSTA, ACM

## 2020-06-30 NOTE — PROGRESS NOTES
[address] [facility] Adult  Hospitalist Group                                                                                          Hospitalist Progress Note  Roxy Shen MD  Answering service: [phone] OR [phone] from in house phone        Date of Service:  2020  NAME:  Ryan Ward  :  1931  MRN:  [id number]    This documentation was facilitated by a Voice Recognition software and may contain inadvertent typographical errors. Admission Summary: This is an 51-year-old female with a significant history of dementia, diabetes, hypertension, hyper lipidemia, stroke, depression and anemia was brought to the ED by her daughter for failure to thrive. Patient has dysphagia to solid food tolerating only liquids. She reportedly had a negative endoscopy few months back. Interval history / Subjective:   F/U for dysphagia     She was calm this morning,resting in bed. 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 -EGD  showed:Granular scaly looking mucosal patches 2-4 mm in size were noted at 32 cm. A tight stricture with ulceration and friability was noted at 34 cm and was not traversed even with the Ultrathin scope. Exact length and nature of the stricture could not be evaluated due to inability to traverse the lesion. -s/p G tube placement by IR  for comfort care  -Not a candidate for treatment of cancer   -tolerating tube feeds  -Noted  note, patient cannot go to facility as she has no medicaid. Will probably will go home on hospice ,need eqipment    Advanced dementia with failure to thrive,behavioral distrubance  -Reorient. Fall precaution  -on Remeron  -on seroquel, calm today. Discussed with RN to watch without sitter.        Hypertension  - BP better on amlodipine    Hypomagnesemia: resolved      Hypokalemia and hypophosphatemia:improved  -Continue neutraphos     Dehydration. hyponatremia resolved. DM  -A1 c is at goal for her age, 7.5 in 5/2020  -blood glucose acceptable range    Tube feeding       Patient's Baseline: Ambulates with walking  DVT ppx: heaprin  Code status: DNR. Plan: hospice at discharge       Hospital Problems  Date Reviewed: 6/28/2020          Codes Class Noted POA    Dysphagia ICD-10-CM: R13.10  ICD-9-CM: 787.20  6/22/2020 Unknown        FTT (failure to thrive) in adult ICD-10-CM: R62.7  ICD-9-CM: 783.7  6/22/2020 Unknown        * (Principal) Tumor of esophagus ICD-10-CM: D49.0  ICD-9-CM: 239.0  6/22/2020 Yes                Review of Systems:   Review of systems not obtained due to patient factors. Vital Signs:    Last 24hrs VS reviewed since prior progress note. Most recent are:  Visit Vitals  /74   Pulse 84   Temp 99.1 °F (37.3 °C)   Resp 16   Ht 5' 3\" (1.6 m)   Wt 40.3 kg (88 lb 13.5 oz)   SpO2 96%   BMI 15.74 kg/m²         Intake/Output Summary (Last 24 hours) at 6/30/2020 1638  Last data filed at 6/30/2020 1605  Gross per 24 hour   Intake 510 ml   Output    Net 510 ml        Physical Examination:             Constitutional:  elderly patient,cachectic   HEENT:  Atraumatic. Oral mucosa moist,. Non icteric sclera. No pallor. Resp:  no wheezing, clear to auscultation b/l       CV:  Regular rhythm, normal rate,S1,S2     GI:  Soft, non distended, non tender. normoactive bowel sounds        Musculoskeletal:  No LE edema    Neurologic:  Mental status: Alert,awake -oriented to herself. Motor exam:Moves all extremities              Data Review:    Review and/or order of clinical lab test  Review and/or order of tests in the medicine section of CPT      Labs:     No results for input(s): WBC, HGB, HCT, PLT, HGBEXT, HCTEXT, PLTEXT, HGBEXT, HCTEXT, PLTEXT in the last 72 hours.   Recent Labs     06/30/20  0513 06/29/20  0309 06/28/20  0311    134* 137   K 3.8 3.7 3.2*    101 105   CO2 29 27 25   BUN 12 15 11   CREA 0.54* 0.57 0.56   * 170* 160*   CA 8.8 8.9 9.1   MG  --   --  1.7   PHOS 2.6 2.3* 2.3*     Recent Labs     06/30/20  0513 06/29/20  0309 06/28/20  0311   ALB 2.3* 2.4* 2.5*     No results for input(s): INR, PTP, APTT, INREXT, INREXT in the last 72 hours. No results for input(s): FE, TIBC, PSAT, FERR in the last 72 hours. Lab Results   Component Value Date/Time    Folate 3.8 05/06/2020 09:09 AM      No results for input(s): PH, PCO2, PO2 in the last 72 hours. No results for input(s): CPK, CKNDX, TROIQ in the last 72 hours.     No lab exists for component: CPKMB  Lab Results   Component Value Date/Time    Cholesterol, total 116 05/06/2020 09:09 AM    HDL Cholesterol 43 05/06/2020 09:09 AM    LDL, calculated 56 05/06/2020 09:09 AM    Triglyceride 84 05/06/2020 09:09 AM    CHOL/HDL Ratio 3.6 09/19/2012 03:30 AM     Lab Results   Component Value Date/Time    Glucose (POC) 192 (H) 06/30/2020 04:32 PM    Glucose (POC) 197 (H) 06/30/2020 12:43 PM    Glucose (POC) 229 (H) 06/30/2020 06:05 AM    Glucose (POC) 241 (H) 06/29/2020 11:26 PM    Glucose (POC) 195 (H) 06/29/2020 04:35 PM     Lab Results   Component Value Date/Time    Color YELLOW/STRAW 12/02/2019 06:03 AM    Appearance CLOUDY (A) 12/02/2019 06:03 AM    Specific gravity 1.017 12/02/2019 06:03 AM    pH (UA) 5.5 12/02/2019 06:03 AM    Protein 30 (A) 12/02/2019 06:03 AM    Glucose NEGATIVE  12/02/2019 06:03 AM    Ketone NEGATIVE  12/02/2019 06:03 AM    Bilirubin NEGATIVE  12/02/2019 06:03 AM    Urobilinogen 1.0 12/02/2019 06:03 AM    Nitrites NEGATIVE  12/02/2019 06:03 AM    Leukocyte Esterase SMALL (A) 12/02/2019 06:03 AM    Epithelial cells FEW 12/02/2019 06:03 AM    Bacteria NEGATIVE  12/02/2019 06:03 AM    WBC 0-4 12/02/2019 06:03 AM    RBC 0-5 12/02/2019 06:03 AM         Medications Reviewed:     Current Facility-Administered Medications   Medication Dose Route Frequency    [START ON 7/1/2020] thiamine HCL (B-1) tablet 100 mg  100 mg Per G Tube DAILY  [START ON 9/5/3808] multivit-folic acid-herbal 083 (WELLESSE PLUS) oral liquid 30 mL  30 mL Per G Tube DAILY    QUEtiapine (SEROquel) tablet 12.5 mg  12.5 mg Oral BID    potassium, sodium phosphates (NEUTRA-PHOS) packet 1 Packet  1 Packet Per G Tube BID    heparin (porcine) injection 5,000 Units  5,000 Units SubCUTAneous Q12H    amLODIPine (NORVASC) tablet 5 mg  5 mg Per G Tube DAILY    sodium chloride (NS) flush 5-40 mL  5-40 mL IntraVENous Q8H    sodium chloride (NS) flush 5-40 mL  5-40 mL IntraVENous PRN    sodium chloride (NS) flush 5-40 mL  5-40 mL IntraVENous Q8H    sodium chloride (NS) flush 5-40 mL  5-40 mL IntraVENous PRN    insulin lispro (HUMALOG) injection   SubCUTAneous Q6H    glucose chewable tablet 16 g  4 Tab Oral PRN    glucagon (GLUCAGEN) injection 1 mg  1 mg IntraMUSCular PRN    dextrose 10% infusion 0-250 mL  0-250 mL IntraVENous PRN     ______________________________________________________________________  EXPECTED LENGTH OF STAY: 5d 9h  ACTUAL LENGTH OF STAY:          8                 Tangela Dasilva MD

## 2020-07-01 LAB
ALBUMIN SERPL-MCNC: 2.3 G/DL (ref 3.5–5)
ANION GAP SERPL CALC-SCNC: 5 MMOL/L (ref 5–15)
BUN SERPL-MCNC: 13 MG/DL (ref 6–20)
BUN/CREAT SERPL: 23 (ref 12–20)
CALCIUM SERPL-MCNC: 8.8 MG/DL (ref 8.5–10.1)
CHLORIDE SERPL-SCNC: 98 MMOL/L (ref 97–108)
CO2 SERPL-SCNC: 30 MMOL/L (ref 21–32)
CREAT SERPL-MCNC: 0.56 MG/DL (ref 0.55–1.02)
GLUCOSE BLD STRIP.AUTO-MCNC: 151 MG/DL (ref 65–100)
GLUCOSE BLD STRIP.AUTO-MCNC: 186 MG/DL (ref 65–100)
GLUCOSE BLD STRIP.AUTO-MCNC: 191 MG/DL (ref 65–100)
GLUCOSE BLD STRIP.AUTO-MCNC: 211 MG/DL (ref 65–100)
GLUCOSE BLD STRIP.AUTO-MCNC: 331 MG/DL (ref 65–100)
GLUCOSE SERPL-MCNC: 204 MG/DL (ref 65–100)
PHOSPHATE SERPL-MCNC: 2.9 MG/DL (ref 2.6–4.7)
POTASSIUM SERPL-SCNC: 3.9 MMOL/L (ref 3.5–5.1)
SERVICE CMNT-IMP: ABNORMAL
SODIUM SERPL-SCNC: 133 MMOL/L (ref 136–145)

## 2020-07-01 PROCEDURE — 82962 GLUCOSE BLOOD TEST: CPT

## 2020-07-01 PROCEDURE — 65270000032 HC RM SEMIPRIVATE

## 2020-07-01 PROCEDURE — 74011250636 HC RX REV CODE- 250/636: Performed by: HOSPITALIST

## 2020-07-01 PROCEDURE — 74011250637 HC RX REV CODE- 250/637: Performed by: HOSPITALIST

## 2020-07-01 PROCEDURE — 80069 RENAL FUNCTION PANEL: CPT

## 2020-07-01 PROCEDURE — 36415 COLL VENOUS BLD VENIPUNCTURE: CPT

## 2020-07-01 PROCEDURE — 74011636637 HC RX REV CODE- 636/637: Performed by: HOSPITALIST

## 2020-07-01 RX ORDER — QUETIAPINE FUMARATE 25 MG/1
12.5 TABLET, FILM COATED ORAL
Status: DISCONTINUED | OUTPATIENT
Start: 2020-07-01 | End: 2020-07-02 | Stop reason: HOSPADM

## 2020-07-01 RX ADMIN — QUETIAPINE FUMARATE 12.5 MG: 25 TABLET ORAL at 09:14

## 2020-07-01 RX ADMIN — Medication 30 ML: at 09:28

## 2020-07-01 RX ADMIN — POTASSIUM & SODIUM PHOSPHATES POWDER PACK 280-160-250 MG 1 PACKET: 280-160-250 PACK at 09:14

## 2020-07-01 RX ADMIN — POTASSIUM & SODIUM PHOSPHATES POWDER PACK 280-160-250 MG 1 PACKET: 280-160-250 PACK at 17:40

## 2020-07-01 RX ADMIN — Medication 10 ML: at 22:54

## 2020-07-01 RX ADMIN — Medication 100 MG: at 09:14

## 2020-07-01 RX ADMIN — AMLODIPINE BESYLATE 5 MG: 5 TABLET ORAL at 09:14

## 2020-07-01 RX ADMIN — Medication 10 ML: at 14:47

## 2020-07-01 RX ADMIN — INSULIN LISPRO 2 UNITS: 100 INJECTION, SOLUTION INTRAVENOUS; SUBCUTANEOUS at 17:43

## 2020-07-01 RX ADMIN — INSULIN LISPRO 4 UNITS: 100 INJECTION, SOLUTION INTRAVENOUS; SUBCUTANEOUS at 06:59

## 2020-07-01 RX ADMIN — QUETIAPINE FUMARATE 12.5 MG: 25 TABLET ORAL at 22:52

## 2020-07-01 RX ADMIN — HEPARIN SODIUM 5000 UNITS: 5000 INJECTION INTRAVENOUS; SUBCUTANEOUS at 03:57

## 2020-07-01 RX ADMIN — HEPARIN SODIUM 5000 UNITS: 5000 INJECTION INTRAVENOUS; SUBCUTANEOUS at 14:46

## 2020-07-01 NOTE — PROGRESS NOTES
Bedside shift change report given to Jolene Doyle RN (oncoming nurse) by Devi Olivarez RN (offgoing nurse). Report included the following information SBAR, Kardex, Intake/Output, MAR and Recent Results.

## 2020-07-01 NOTE — PROGRESS NOTES
NUTRITION brief       Spoke with  yesterday. Pt does not have coverage for facility so will be going home with Hospice. Tolerating transition to bolus feeds over night with bolus of 160ml q4hr. Since will be going home will increase feeds to 240ml bolus only 4x/day during daytime hours to make administration easier for daughter at home. Recommend: Jevity 1.5, 240ml bolus 4x/day (8am, 12pm, 4pm, 8pm) + 90ml flush pre/post bolus. Provides same nutrition as current feeds. See full RD assessment from 6/30 for additional details, goals, and monitoring/evaluation.      Sadaf Reveles, RD [address] [place] , Pager #[phone] or via [employer]

## 2020-07-01 NOTE — ROUTINE PROCESS
Patients daughter stated she will be here around 12 for bolus feed teaching. RN spent 45 mins teaching the patient's daughter how to do the bolus tube feeds. Told her daughter to call today if she has any further questions. Will send the patient home tomorrow with extra tube feed supplies.

## 2020-07-01 NOTE — PROGRESS NOTES
[facility] Adult  Hospitalist Group                                                                                          Hospitalist Progress Note  Indra Montoya MD  Answering service: [phone] or [phone] from in house phone        Date of Service:  2020  NAME:  Rona Jaimes  :  1931  MRN:  [id number]    This documentation was facilitated by a Voice Recognition software and may contain inadvertent typographical errors. Admission Summary: This is an 77-year-old female with a significant history of dementia, diabetes, hypertension, hyper lipidemia, stroke, depression and anemia was brought to the ED by her daughter for failure to thrive. Patient has dysphagia to solid food tolerating only liquids. She reportedly had a negative endoscopy few months back. 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 -EGD  showed:Granular scaly looking mucosal patches 2-4 mm in size were noted at 32 cm. A tight stricture with ulceration and friability was noted at 34 cm and was not traversed even with the Ultrathin scope. Exact length and nature of the stricture could not be evaluated due to inability to traverse the lesion. -s/p G tube placement by IR  for comfort care  -Not a candidate for treatment of cancer   -tolerating tube feeds- changed to bolus feeds which is tolerating now, no diarrhea.  -Plan if now discharge home with hospice tomorrow    Advanced dementia with failure to thrive,behavioral distrubance  -Reorient. Fall precaution  -on Remeron  -on seroquel for agitation       Hypertension  - BP better on amlodipine    Hypomagnesemia: resolved      Hypokalemia and hypophosphatemia:improved  -Continue neutraphos  -Electrolytes wnl now, no more labs     Dehydration.   hyponatremia resolved. DM  -A1 c is at goal for her age, 7.5 in 5/2020  -blood glucose acceptable range    Tube feeding       Patient's Baseline: Ambulates with walking  DVT ppx: heaprin  Code status: DNR. Plan: hospice at discharge       Hospital Problems  Date Reviewed: 6/28/2020          Codes Class Noted POA    Dysphagia ICD-10-CM: R13.10  ICD-9-CM: 787.20  6/22/2020 Unknown        FTT (failure to thrive) in adult ICD-10-CM: R62.7  ICD-9-CM: 783.7  6/22/2020 Unknown        * (Principal) Tumor of esophagus ICD-10-CM: D49.0  ICD-9-CM: 239.0  6/22/2020 Yes                Review of Systems:   Review of systems not obtained due to patient factors. Vital Signs:    Last 24hrs VS reviewed since prior progress note. Most recent are:  Visit Vitals  /75 (BP 1 Location: Left arm, BP Patient Position: At rest)   Pulse 82   Temp 98 °F (36.7 °C)   Resp 14   Ht 5' 3\" (1.6 m)   Wt 40.3 kg (88 lb 13.5 oz)   SpO2 94%   BMI 15.74 kg/m²         Intake/Output Summary (Last 24 hours) at 7/1/2020 1649  Last data filed at 7/1/2020 0731  Gross per 24 hour   Intake 580 ml   Output    Net 580 ml        Physical Examination:             Constitutional:  elderly patient,cachectic   HEENT:  Atraumatic. Oral mucosa moist,. Non icteric sclera. No pallor. Resp:  no wheezing, clear to auscultation b/l       CV:  Regular rhythm, normal rate,S1,S2     GI:  Soft, non distended, non tender. normoactive bowel sounds        Musculoskeletal:  No LE edema    Neurologic:  Mental status: awake,alert,oriented to herself. Data Review:    Review and/or order of clinical lab test  Review and/or order of tests in the medicine section of CPT      Labs:     No results for input(s): WBC, HGB, HCT, PLT, HGBEXT, HCTEXT, PLTEXT, HGBEXT, HCTEXT, PLTEXT in the last 72 hours.   Recent Labs     07/01/20  0405 06/30/20  0513 06/29/20  0309   * 136 134*   K 3.9 3.8 3.7   CL 98 100 101   CO2 30 29 27   BUN 13 12 15   CREA 0.56 0.54* 0.57   GLU 204* 208* 170*   CA 8.8 8.8 8.9   PHOS 2.9 2.6 2.3*     Recent Labs     07/01/20  0405 06/30/20  0513 06/29/20  0309   ALB 2.3* 2.3* 2.4*     No results for input(s): INR, PTP, APTT, INREXT, INREXT in the last 72 hours. No results for input(s): FE, TIBC, PSAT, FERR in the last 72 hours. Lab Results   Component Value Date/Time    Folate 3.8 05/06/2020 09:09 AM      No results for input(s): PH, PCO2, PO2 in the last 72 hours. No results for input(s): CPK, CKNDX, TROIQ in the last 72 hours.     No lab exists for component: CPKMB  Lab Results   Component Value Date/Time    Cholesterol, total 116 05/06/2020 09:09 AM    HDL Cholesterol 43 05/06/2020 09:09 AM    LDL, calculated 56 05/06/2020 09:09 AM    Triglyceride 84 05/06/2020 09:09 AM    CHOL/HDL Ratio 3.6 09/19/2012 03:30 AM     Lab Results   Component Value Date/Time    Glucose (POC) 151 (H) 07/01/2020 11:58 AM    Glucose (POC) 331 (H) 07/01/2020 06:15 AM    Glucose (POC) 186 (H) 07/01/2020 12:18 AM    Glucose (POC) 192 (H) 06/30/2020 04:32 PM    Glucose (POC) 197 (H) 06/30/2020 12:43 PM     Lab Results   Component Value Date/Time    Color YELLOW/STRAW 12/02/2019 06:03 AM    Appearance CLOUDY (A) 12/02/2019 06:03 AM    Specific gravity 1.017 12/02/2019 06:03 AM    pH (UA) 5.5 12/02/2019 06:03 AM    Protein 30 (A) 12/02/2019 06:03 AM    Glucose NEGATIVE  12/02/2019 06:03 AM    Ketone NEGATIVE  12/02/2019 06:03 AM    Bilirubin NEGATIVE  12/02/2019 06:03 AM    Urobilinogen 1.0 12/02/2019 06:03 AM    Nitrites NEGATIVE  12/02/2019 06:03 AM    Leukocyte Esterase SMALL (A) 12/02/2019 06:03 AM    Epithelial cells FEW 12/02/2019 06:03 AM    Bacteria NEGATIVE  12/02/2019 06:03 AM    WBC 0-4 12/02/2019 06:03 AM    RBC 0-5 12/02/2019 06:03 AM         Medications Reviewed:     Current Facility-Administered Medications   Medication Dose Route Frequency    thiamine HCL (B-1) tablet 100 mg  100 mg Per G Tube DAILY    multivit-folic acid-herbal 936 (WELLESSE PLUS) oral liquid 30 mL  30 mL Per G Tube DAILY    QUEtiapine (SEROquel) tablet 12.5 mg  12.5 mg Oral BID    potassium, sodium phosphates (NEUTRA-PHOS) packet 1 Packet  1 Packet Per G Tube BID    heparin (porcine) injection 5,000 Units  5,000 Units SubCUTAneous Q12H    amLODIPine (NORVASC) tablet 5 mg  5 mg Per G Tube DAILY    sodium chloride (NS) flush 5-40 mL  5-40 mL IntraVENous Q8H    sodium chloride (NS) flush 5-40 mL  5-40 mL IntraVENous PRN    sodium chloride (NS) flush 5-40 mL  5-40 mL IntraVENous Q8H    sodium chloride (NS) flush 5-40 mL  5-40 mL IntraVENous PRN    insulin lispro (HUMALOG) injection   SubCUTAneous Q6H    glucose chewable tablet 16 g  4 Tab Oral PRN    glucagon (GLUCAGEN) injection 1 mg  1 mg IntraMUSCular PRN    dextrose 10% infusion 0-250 mL  0-250 mL IntraVENous PRN     ______________________________________________________________________  EXPECTED LENGTH OF STAY: 5d 9h  ACTUAL LENGTH OF STAY:          9                 Rupert Encinas MD

## 2020-07-01 NOTE — PROGRESS NOTES
BECKY:    RUR: 20%    CM continuing to work on disposition for this patient. [facility] has accepted patient for home hospice and may be able to accommodate the tube feedings/provide the tube feedings themselves, as opposed to a third party. CM waiting for a call back concerning that. They will be delivering the equipment to the home this afternoon. Patients' daughter will be coming to the hospital at noon to be taught how to administer the tube feedings. 12:15pm: Daughter Dede Laura has been taught how to administer the tube feedings. [facility] will deliver the DME around 5-6 pm this evening. Patient will need to remain in the hospital until tomorrow morning because of this. Referral sent to [facility] requesting a 10:00 am  for tomorrow morning.      Kong COSTA, ACM

## 2020-07-01 NOTE — ROUTINE PROCESS
Bedside and Verbal shift change report given to [address] [place] [address] Ramesh (oncoming nurse) by May Mathew (offgoing nurse). Report included the following information SBAR, Kardex and Recent Results.

## 2020-07-02 VITALS
WEIGHT: 88.85 LBS | HEIGHT: 63 IN | BODY MASS INDEX: 15.74 KG/M2 | TEMPERATURE: 98.4 F | SYSTOLIC BLOOD PRESSURE: 133 MMHG | DIASTOLIC BLOOD PRESSURE: 84 MMHG | RESPIRATION RATE: 14 BRPM | HEART RATE: 72 BPM | OXYGEN SATURATION: 96 %

## 2020-07-02 LAB
ALBUMIN SERPL-MCNC: 2.4 G/DL (ref 3.5–5)
ANION GAP SERPL CALC-SCNC: 7 MMOL/L (ref 5–15)
BUN SERPL-MCNC: 13 MG/DL (ref 6–20)
BUN/CREAT SERPL: 25 (ref 12–20)
CALCIUM SERPL-MCNC: 9.3 MG/DL (ref 8.5–10.1)
CHLORIDE SERPL-SCNC: 98 MMOL/L (ref 97–108)
CO2 SERPL-SCNC: 29 MMOL/L (ref 21–32)
CREAT SERPL-MCNC: 0.51 MG/DL (ref 0.55–1.02)
GLUCOSE BLD STRIP.AUTO-MCNC: 172 MG/DL (ref 65–100)
GLUCOSE SERPL-MCNC: 169 MG/DL (ref 65–100)
PHOSPHATE SERPL-MCNC: 3.2 MG/DL (ref 2.6–4.7)
POTASSIUM SERPL-SCNC: 4 MMOL/L (ref 3.5–5.1)
SERVICE CMNT-IMP: ABNORMAL
SODIUM SERPL-SCNC: 134 MMOL/L (ref 136–145)

## 2020-07-02 PROCEDURE — 80069 RENAL FUNCTION PANEL: CPT

## 2020-07-02 PROCEDURE — 74011250636 HC RX REV CODE- 250/636: Performed by: HOSPITALIST

## 2020-07-02 PROCEDURE — 36415 COLL VENOUS BLD VENIPUNCTURE: CPT

## 2020-07-02 PROCEDURE — 82962 GLUCOSE BLOOD TEST: CPT

## 2020-07-02 PROCEDURE — 74011250637 HC RX REV CODE- 250/637: Performed by: HOSPITALIST

## 2020-07-02 RX ORDER — AMLODIPINE BESYLATE 10 MG/1
5 TABLET ORAL DAILY
Qty: 90 TAB | Refills: 1 | Status: SHIPPED
Start: 2020-07-02

## 2020-07-02 RX ORDER — QUETIAPINE FUMARATE 25 MG/1
12.5 TABLET, FILM COATED ORAL
Qty: 30 TAB | Refills: 0 | Status: SHIPPED | OUTPATIENT
Start: 2020-07-02

## 2020-07-02 RX ORDER — LANOLIN ALCOHOL/MO/W.PET/CERES
100 CREAM (GRAM) TOPICAL DAILY
Qty: 30 TAB | Refills: 0 | Status: SHIPPED | OUTPATIENT
Start: 2020-07-02

## 2020-07-02 RX ADMIN — AMLODIPINE BESYLATE 5 MG: 5 TABLET ORAL at 08:54

## 2020-07-02 RX ADMIN — HEPARIN SODIUM 5000 UNITS: 5000 INJECTION INTRAVENOUS; SUBCUTANEOUS at 03:46

## 2020-07-02 RX ADMIN — Medication 10 ML: at 06:00

## 2020-07-02 RX ADMIN — Medication 100 MG: at 08:54

## 2020-07-02 RX ADMIN — Medication 30 ML: at 08:54

## 2020-07-02 RX ADMIN — POTASSIUM & SODIUM PHOSPHATES POWDER PACK 280-160-250 MG 1 PACKET: 280-160-250 PACK at 08:54

## 2020-07-02 NOTE — PROGRESS NOTES
BECKY:    RUR: 20%    Patient will be discharging home today with [facility].     [facility] transport will  at 10:00 am.    Teresaa. Emily 82, ACM

## 2020-07-02 NOTE — DISCHARGE INSTRUCTIONS
Discharge Instructions       PATIENT ID: Jose Lawrence  MRN: [id number]   YOB: 1931    DATE OF ADMISSION: 6/22/2020 10:44 AM    DATE OF DISCHARGE: 7/2/2020    PRIMARY CARE PROVIDER: Oliva Chua DO     ATTENDING PHYSICIAN: Thuan Fregoso MD  DISCHARGING PROVIDER: Cherry Menard MD    To contact this individual call [phone] and ask the  to page. If unavailable ask to be transferred the Adult Hospitalist Department. 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 Body mass index is 14.7 kg/m².    # S/p G tube placement by IR 6/25 for comfort care. 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 Improved. On G-tube feeds  # Hyponatremia: resolved. # DM2, controlled.  A1c 7.5 in 5/2020    CONSULTATIONS: IP CONSULT TO GASTROENTEROLOGY  IP CONSULT TO PALLIATIVE CARE - PROVIDER  IP CONSULT TO ONCOLOGY  IP CONSULT TO HOSPITALIST  IP CONSULT TO HOSPITALIST    PROCEDURES/SURGERIES: Procedure(s):  ESOPHAGOGASTRODUODENOSCOPY (EGD) : -  ESOPHAGOGASTRODUODENAL (EGD) BIOPSY    PENDING TEST RESULTS:   At the time of discharge the following test results are still pending: none    FOLLOW UP APPOINTMENTS:   Follow-up Information     Follow up With Specialties Details Why Contact Info    Oliva Chua DO Internal Medicine  If needed, if not able to reach [facility] hospice or if recommended by [address]  [address]  [facility]   Please call coordinator/ nurse of [facility] hopsice care team for any concerns, follow ups. etc.            ADDITIONAL CARE RECOMMENDATIONS:   Follow up with [facility] hospice for any comfort care and goals of care related concerns. DIET: Comfort feeding - via PEG tube as noted below  - Jevity 1.5, 200ml bolus + 90ml flush pre/post bolus. Increase bolus by 40ml every 4hrs to goal bolus of 240ml. - Goal: Jevity 1.5, 240ml bolus 4x/day (8am, 12pm, 4pm, 8pm) + 90ml flush pre/post bolus    ACTIVITY: for comfort and as tolerated    WOUND CARE: for comfort    EQUIPMENT needed: for comfort      DISCHARGE MEDICATIONS:   See Medication Reconciliation Form    · It is important that you take the medication exactly as they are prescribed. · Keep your medication in the bottles provided by the pharmacist and keep a list of the medication names, dosages, and times to be taken in your wallet. · Do not take other medications without consulting your doctor. NOTIFY YOUR PHYSICIAN FOR ANY OF THE FOLLOWING:   Fever over 101 degrees for 24 hours. Chest pain, shortness of breath, fever, chills, nausea, vomiting, diarrhea, change in mentation, falling, weakness, bleeding. Severe pain or pain not relieved by medications. Or, any other signs or symptoms that you may have questions about. DISPOSITION:  x  Home With:   OT  PT  HH  RN       SNF/Inpatient Rehab/LTAC    Independent/assisted living   x Hospice    Other:     CDMP Checked:   Yes x     PROBLEM LIST Updated:  Yes x        My Medications      START taking these medications      Instructions Each Dose to Equal Morning Noon Evening Bedtime   multivit-folic acid-herbal 614 552 mcg-200 mg/30 mL Liqd oral liquid  Commonly known as:  [address]    Your last dose was: Your next dose is:          30 mL by Per G Tube route daily. 30 mL                 QUEtiapine 25 mg tablet  Commonly known as:  SEROquel    Your last dose was: Your next dose is:          0.5 Tabs by Per G Tube route nightly. 12.5 mg                 thiamine  mg tablet  Commonly known as:  B-1    Your last dose was: Your next dose is:          1 Tab by Per G Tube route daily.    100 mg CHANGE how you take these medications      Instructions Each Dose to Equal Morning Noon Evening Bedtime   amLODIPine 10 mg tablet  Commonly known as:  NORVASC  What changed:    · how much to take  · how to take this  · when to take this  · additional instructions    Your last dose was: Your next dose is:          0.5 Tabs by Per G Tube route daily. TAKE 0.5 TABLET = 5 mg DAILY. Dose reduced. 5 mg                    CONTINUE taking these medications      Instructions Each Dose to Equal Morning Noon Evening Bedtime   acetaminophen 650 mg Tab    Your last dose was: Your next dose is: Take 650 mg by mouth every six (6) hours as needed. 650 mg                 aspirin delayed-release 81 mg tablet    Your last dose was: Your next dose is: Take 1 tablet by mouth once daily                  atorvastatin 40 mg tablet  Commonly known as:  LIPITOR    Your last dose was: Your next dose is:          TAKE 1 TABLET EVERY DAY                  cloNIDine HCL 0.1 mg tablet  Commonly known as:  CATAPRES    Your last dose was: Your next dose is:          TAKE 1 TABLET AT BEDTIME                  food supplemt, lactose-reduced Liqd  Commonly known as:  Ensure Clear    Your last dose was: Your next dose is: Take 237 mL by mouth two (2) times a day. 237 mL                 mirtazapine 7.5 mg tablet  Commonly known as:  REMERON    Your last dose was: Your next dose is:          TAKE 1 TABLET NIGHTLY                  omeprazole 20 mg capsule  Commonly known as:  PRILOSEC    Your last dose was:       Your next dose is:          TAKE 1 CAPSULE DAILY FOR   GASTROESOPHAGEAL REFLUX    DISEASE                     STOP taking these medications    ferrous sulfate 325 mg (65 mg iron) tablet        geriatric multivitamins & minerals Elix  Commonly known as:  ELDERTONIC        losartan-hydroCHLOROthiazide 100-25 mg per tablet  Commonly known as:  HYZAAR        metFORMIN 500 mg tablet  Commonly known as:  GLUCOPHAGE        potassium chloride 20 mEq tablet  Commonly known as:  K-PATRICK SIMMSOR-JUAN              Where to Get Your Medications      These medications were sent to [facility], [facility], [address] [place] [id number]    Phone:  [phone]   · QUEtiapine 25 mg tablet     Information on where to get these meds will be given to you by the nurse or doctor.     Ask your nurse or doctor about these medications  · amLODIPine 10 mg tablet  · multivit-folic acid-herbal 394 607 mcg-200 mg/30 mL Liqd oral liquid  · thiamine  mg tablet         Signed:   Yanira Pascal MD  7/2/2020  8:47 AM

## 2020-07-02 NOTE — PROGRESS NOTES
Bedside shift change report given to Denia Wilson (oncoming nurse) by Collette Nielsen, RN (offgoing nurse). Report included the following information SBAR, Kardex, Intake/Output, MAR and Recent Results.

## 2020-07-02 NOTE — DISCHARGE SUMMARY
Discharge Summary       PATIENT ID: Adi Vargas  MRN: [id number]   YOB: 1931    DATE OF ADMISSION: 6/22/2020 10:44 AM    DATE OF DISCHARGE: 07/02/20   PRIMARY CARE PROVIDER: Judson Ch DO     ATTENDING PHYSICIAN: Michael Mendoza MD  DISCHARGING PROVIDER: Michael Mendoza MD    To contact this individual call [phone] and ask the  to page. If unavailable ask to be transferred the Adult Hospitalist Department. 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 Body mass index is 14.7 kg/m².    # S/p G tube placement by IR 6/25 for comfort care. 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 Improved. On G-tube feeds  # Hyponatremia: resolved. # DM2, controlled. A1c 7.5 in 5/2020    Admission Summary: This is an 59-year-old female with a significant history of dementia, diabetes, hypertension, hyper lipidemia, stroke, depression and anemia was brought to the ED by her daughter for failure to thrive. Patient has dysphagia to solid food tolerating only liquids. She reportedly had a negative endoscopy few months back.         Interval history / Subjective:   No overnight events. Tolerating feeds well. CM has arranged transportation at 1000 am.   No other concerns. Pt seems comfortable. D/w nursing team.      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  Salvatore Alvarez disease and not a candidate for cancer treatment given comorbidities  Severe protein calorie malnutrition   Body mass index is 14.7 kg/m².   -EGD 6/24 showed:Granular scaly looking mucosal patches 2-4 mm in size were noted at 32 cm. A tight stricture with ulceration and friability was noted at 34 cm and was not traversed even with the Ultrathin scope. Exact length and nature of the stricture could not be evaluated due to inability to traverse the lesion.   -Palliative care on board. Appreciate facilitating [address]  -Appreciate GI care. S/o on 6/26  -s/p G tube placement by IR 6/25 for comfort care  -Not a candidate for treatment of cancer per Hem/onc team. Appreciate recommendations  -tolerating tube feeds- changed to bolus feeds which is tolerating now, no diarrhea.  -Plan: discharge home with hospice 7/2     Advanced dementia with failure to thrive,behavioral distrubance  -Reorient. Fall precaution  -on Remeron  -on seroquel for agitation     Hypertension  - BP better on amlodipine     Hypomagnesemia: resolved     Hypokalemia and hypophosphatemia:improved  -Continue neutraphos  -Electrolytes wnl now, no more labs     Dehydration. hyponatremia resolved.     DM2  -A1 c is at goal for her age, 7.5 in 5/2020  -blood glucose acceptable range     Tube feeding       Patient's Baseline: Ambulates with walking  DVT ppx: heaprin  Code status: DNR.     Plan: hospice at discharge     [address]:   Follow up with Dena Hutson [address] for any comfort care and goals of care related concerns.      PENDING TEST RESULTS:   At the time of discharge the following test results are still pending: none    FOLLOW UP APPOINTMENTS:    Follow-up Information     Follow up With Specialties Details Why Contact Aníbal Giang, DO Internal Medicine  If needed, if not able to reach [address]ena [address] or if recommended by Dena Hutson Dr [address] [place] [address]  [address]  [place] [address]   Please call coordinator/ nurse of [facility] care team for any concerns, follow ups. etc.              DIET: Comfort feeding - via PEG tube as noted below  - Jevity 1.5, 200ml bolus + 90ml flush pre/post bolus. Increase bolus by 40ml every 4hrs to goal bolus of 240ml. - Goal: Jevity 1.5, 240ml bolus 4x/day (8am, 12pm, 4pm, 8pm) + 90ml flush pre/post bolus    ACTIVITY: for comfort and as tolerated    WOUND CARE: for comfort    EQUIPMENT needed: for comfort      DISCHARGE MEDICATIONS:  Current Discharge Medication List      START taking these medications    Details   QUEtiapine (SEROquel) 25 mg tablet 0.5 Tabs by Per G Tube route nightly. Qty: 30 Tab, Refills: 0      multivit-folic acid-herbal 516 (WELLESSE PLUS) 400 mcg-200 mg/30 mL liqd oral liquid 30 mL by Per G Tube route daily. Qty: 1 Bottle, Refills: 0      thiamine HCL (B-1) 100 mg tablet 1 Tab by Per G Tube route daily. Qty: 30 Tab, Refills: 0         CONTINUE these medications which have CHANGED    Details   amLODIPine (NORVASC) 10 mg tablet 0.5 Tabs by Per G Tube route daily. TAKE 0.5 TABLET = 5 mg DAILY. Dose reduced. Qty: 90 Tab, Refills: 1         CONTINUE these medications which have NOT CHANGED    Details   omeprazole (PRILOSEC) 20 mg capsule TAKE 1 CAPSULE DAILY FOR   GASTROESOPHAGEAL REFLUX    DISEASE  Qty: 30 Cap, Refills: 3    Associated Diagnoses: Gastroesophageal reflux disease without esophagitis      aspirin delayed-release 81 mg tablet Take 1 tablet by mouth once daily  Qty: 90 Tab, Refills: 3      atorvastatin (LIPITOR) 40 mg tablet TAKE 1 TABLET EVERY DAY  Qty: 90 Tab, Refills: 1    Comments: Pharmacist: please tell pt she needs an office visit      cloNIDine HCL (CATAPRES) 0.1 mg tablet TAKE 1 TABLET AT BEDTIME  Qty: 90 Tab, Refills: 1      food supplemt, lactose-reduced (ENSURE CLEAR) liqd Take 237 mL by mouth two (2) times a day.   Qty: 960 mL, Refills: 2    Associated Diagnoses: Mild protein-calorie malnutrition ([address]      mirtazapine (REMERON) 7.5 mg tablet TAKE 1 TABLET NIGHTLY  Qty: 90 Tab, Refills: 2      acetaminophen 650 mg Tab Take 650 mg by mouth every six (6) hours as needed. Qty: 100 Tab, Refills: 0         STOP taking these medications       potassium chloride (K-DUR, KLOR-CON) 20 mEq tablet Comments:   Reason for Stopping:         ferrous sulfate 325 mg (65 mg iron) tablet Comments:   Reason for Stopping:         losartan-hydroCHLOROthiazide (HYZAAR) 100-25 mg per tablet Comments:   Reason for Stopping:         metFORMIN (GLUCOPHAGE) 500 mg tablet Comments:   Reason for Stopping:         geriatric multivitamins & minerals (ELDERTONIC) elix Comments:   Reason for Stopping:                 NOTIFY YOUR PHYSICIAN FOR ANY OF THE FOLLOWING:   Fever over 101 degrees for 24 hours. Chest pain, shortness of breath, fever, chills, nausea, vomiting, diarrhea, change in mentation, falling, weakness, bleeding. Severe pain or pain not relieved by medications. Or, any other signs or symptoms that you may have questions about. DISPOSITION:  x  Home With:   OT  PT  HH  RN       Long term SNF/Inpatient Rehab    Independent/assisted living   x Hospice    Other:       PATIENT CONDITION AT DISCHARGE:     Functional status   x Poor     Deconditioned     Independent      Cognition     Lucid     Forgetful    x Dementia      Catheters/lines (plus indication)    Tucker     PICC    x PEG     None      Code status     Full code    x DNR      PHYSICAL EXAMINATION AT DISCHARGE:  Constitutional:  elderly patient,cachectic   HEENT:  Atraumatic. Oral mucosa moist,. Non icteric sclera. No pallor. Resp:  no wheezing, clear to auscultation b/l   CV:  Regular rhythm, normal rate,S1,S2     GI:  Soft, non distended, non tender. normoactive bowel sounds    Musculoskeletal:  No LE edema    Neurologic:  Mental status: awake,alert,oriented to herself.         CHRONIC MEDICAL DIAGNOSES:  Problem List as of 7/2/2020 Date Reviewed: 6/28/2020          Codes Class Noted - Resolved    Dysphagia ICD-10-CM: R13.10  ICD-9-CM: 787.20  6/22/2020 - Present        FTT (failure to thrive) in adult ICD-10-CM: R62.7  ICD-9-CM: 783.7  6/22/2020 - Present        * (Principal) Tumor of esophagus ICD-10-CM: D49.0  ICD-9-CM: 239.0  6/22/2020 - Present        Late onset Alzheimer's disease without behavioral disturbance (HCC) ICD-10-CM: G30.1, F02.80  ICD-9-CM: 331.0, 294.10  9/3/2019 - Present        Oropharyngeal dysphagia ICD-10-CM: R13.12  ICD-9-CM: 787.22  9/3/2019 - Present        Depression ICD-10-CM: F32.9  ICD-9-CM: 090  2/2/2018 - Present        Chronic anemia ICD-10-CM: D64.9  ICD-9-CM: 285.9  12/1/2017 - Present        Chronic diarrhea ICD-10-CM: K52.9  ICD-9-CM: 787.91  12/16/2016 - Present        ACP (advance care planning) ICD-10-CM: Z71.89  ICD-9-CM: V65.49  12/16/2016 - Present        Memory impairment ICD-10-CM: R41.3  ICD-9-CM: 780.93  12/16/2016 - Present        Type 2 diabetes mellitus without complication ([facility] WSI-64-QH: E11.9  ICD-9-CM: 250.00  4/17/2015 - Present        Essential hypertension ICD-10-CM: I10  ICD-9-CM: 401.9  4/17/2015 - Present        Hypercholesterolemia ICD-10-CM: E78.00  ICD-9-CM: 272.0  4/17/2015 - Present        CVA, old, cognitive deficits ICD-10-CM: I69.319  ICD-9-CM: 438.0  4/17/2015 - Present        Insomnia ICD-10-CM: G47.00  ICD-9-CM: 780.52  4/17/2015 - Present        Cataracts, bilateral ICD-10-CM: H26.9  ICD-9-CM: 366.9  4/17/2015 - Present        RESOLVED: Mild depression ([facility] ICD-10-CM: F32.0  ICD-9-CM: 311  8/20/2018 - 5/31/2019        RESOLVED: Medicare annual wellness visit, subsequent ICD-10-CM: Z00.00  ICD-9-CM: V70.0  8/20/2018 - 9/24/2019        RESOLVED: Stroke ([facility] ICD-10-CM: I63.9  ICD-9-CM: 434.91  9/18/2012 - 9/21/2012            Radiology  Ir Insert Gastrostomy Tube Perc    Result Date: 6/25/2020  IMPRESSION: Successful placement of 18 Bengali gastrostomy tube.      Recent Results (from the past 24 hour(s))   GLUCOSE, POC Collection Time: 07/01/20 11:58 AM   Result Value Ref Range    Glucose (POC) 151 (H) 65 - 100 mg/dL    Performed by [address], POC    Collection Time: 07/01/20  5:36 PM   Result Value Ref Range    Glucose (POC) 211 (H) 65 - 100 mg/dL    Performed by [address], POC    Collection Time: 07/01/20 11:00 PM   Result Value Ref Range    Glucose (POC) 191 (H) 65 - 100 mg/dL    Performed by Harley Solorzano    RENAL FUNCTION PANEL    Collection Time: 07/02/20  4:30 AM   Result Value Ref Range    Sodium 134 (L) 136 - 145 mmol/L    Potassium 4.0 3.5 - 5.1 mmol/L    Chloride 98 97 - 108 mmol/L    CO2 29 21 - 32 mmol/L    Anion gap 7 5 - 15 mmol/L    Glucose 169 (H) 65 - 100 mg/dL    BUN 13 6 - 20 MG/DL    Creatinine 0.51 (L) 0.55 - 1.02 MG/DL    BUN/Creatinine ratio 25 (H) 12 - 20      GFR est AA >60 >60 ml/min/1.73m2    GFR est non-AA >60 >60 ml/min/1.73m2    Calcium 9.3 8.5 - 10.1 MG/DL    Phosphorus 3.2 2.6 - 4.7 MG/DL    Albumin 2.4 (L) 3.5 - 5.0 g/dL   GLUCOSE, POC    Collection Time: 07/02/20  5:11 AM   Result Value Ref Range    Glucose (POC) 172 (H) 65 - 100 mg/dL    Performed by ABDIEL RAMIREZ [facility]  [facility]        Greater than 40 minutes were spent with the patient on counseling and coordination of care    Signed:   Delmis Teague MD  7/2/2020  8:48 AM

## 2020-07-03 ENCOUNTER — PATIENT OUTREACH (OUTPATIENT)
Dept: FAMILY MEDICINE CLINIC | Age: 85
End: 2020-07-03

## 2020-07-03 NOTE — PROGRESS NOTES
Patient contacted regarding recent discharge and COVID-19 risk. Discussed COVID-19 related testing which was available at this time. Test results were negative. Patient informed of results, if available? yes    Care Transition Nurse/ Ambulatory Care Manager contacted the family  Daughter) by telephone to perform post discharge assessment. Verified name and  with family as identifiers. Patient has following risk factors of: diabetes, HTN. CTN/ACM reviewed discharge instructions, medical action plan and red flags related to discharge diagnosis. Reviewed and educated them on any new and changed medications related to discharge diagnosis. Advised obtaining a 90-day supply of all daily and as-needed medications. Education provided regarding infection prevention, and signs and symptoms of COVID-19 and when to seek medical attention with family who verbalized understanding. Discussed exposure protocols and quarantine from [address] you at higher risk for severe illness  and given an opportunity for questions and concerns. The family agrees to contact the COVID-19 hotline [phone] or PCP office for questions related to their healthcare. CTN/ACM provided contact information for future reference. From CDC: Are you at higher risk for severe illness?  Wash your hands often.  Avoid close contact (6 feet, which is about two arm lengths) with people who are sick.  Put distance between yourself and other people if COVID-19 is spreading in your community.  Clean and disinfect frequently touched surfaces.  Avoid all cruise travel and non-essential air travel.  Call your healthcare professional if you have concerns about COVID-19 and your underlying condition or if you are sick.     For more information on steps you can take to protect yourself, see CDC's How to Protect Yourself      Patient/family/caregiver given information for Nikhil Burkett and agrees to enroll no  Patient's preferred e-mail:  n/a  Patient's preferred phone number:n/a  Based on Loop alert triggers, patient will be contacted by nurse care manager for worsening symptoms. Plan for follow-up call in 7-14 days based on severity of symptoms and risk factors. Daughter will call for follow up.

## 2020-07-13 ENCOUNTER — VIRTUAL VISIT (OUTPATIENT)
Dept: INTERNAL MEDICINE CLINIC | Age: 85
End: 2020-07-13

## 2020-07-13 DIAGNOSIS — E44.1 MILD PROTEIN-CALORIE MALNUTRITION (HCC): ICD-10-CM

## 2020-07-13 DIAGNOSIS — G30.1 LATE ONSET ALZHEIMER'S DISEASE WITHOUT BEHAVIORAL DISTURBANCE (HCC): ICD-10-CM

## 2020-07-13 DIAGNOSIS — I10 ESSENTIAL HYPERTENSION: ICD-10-CM

## 2020-07-13 DIAGNOSIS — F02.80 LATE ONSET ALZHEIMER'S DISEASE WITHOUT BEHAVIORAL DISTURBANCE (HCC): ICD-10-CM

## 2020-07-13 DIAGNOSIS — E11.9 TYPE 2 DIABETES MELLITUS WITHOUT COMPLICATION, WITHOUT LONG-TERM CURRENT USE OF INSULIN (HCC): ICD-10-CM

## 2020-07-13 DIAGNOSIS — D64.9 CHRONIC ANEMIA: ICD-10-CM

## 2020-07-13 DIAGNOSIS — Z51.5 HOSPICE CARE PATIENT: ICD-10-CM

## 2020-07-13 DIAGNOSIS — C15.9 MALIGNANT NEOPLASM OF ESOPHAGUS, UNSPECIFIED LOCATION (HCC): Primary | ICD-10-CM

## 2020-07-13 RX ORDER — MORPHINE SULFATE 20 MG/ML
SOLUTION ORAL
COMMUNITY
Start: 2020-07-03

## 2020-07-13 RX ORDER — LANOLIN ALCOHOL/MO/W.PET/CERES
CREAM (GRAM) TOPICAL
COMMUNITY

## 2020-07-13 RX ORDER — HYOSCYAMINE SULFATE 0.12 MG/ML
SOLUTION/ DROPS ORAL
COMMUNITY
Start: 2020-07-03

## 2020-07-13 RX ORDER — HALOPERIDOL 2 MG/ML
SOLUTION ORAL
COMMUNITY
Start: 2020-07-03

## 2020-07-13 RX ORDER — LORAZEPAM 2 MG/ML
SOLUTION, CONCENTRATE ORAL
COMMUNITY
Start: 2020-07-03

## 2020-07-13 RX ORDER — METFORMIN HYDROCHLORIDE 500 MG/1
500 TABLET ORAL
COMMUNITY

## 2020-07-13 RX ORDER — ACETAMINOPHEN 650 MG/1
SUPPOSITORY RECTAL
COMMUNITY
Start: 2020-07-03

## 2020-07-13 NOTE — PROGRESS NOTES
ADVISED PATIENT OF THE FOLLOWING HEALTH MAINTAINCE DUE  Health Maintenance Due   Topic Date Due    Shingrix Vaccine Age 49> (1 of 2) 11/14/1981    Bone Densitometry (Dexa) Screening  11/14/1996    GLAUCOMA SCREENING Q2Y  08/11/2018    Eye Exam Retinal or Dilated  08/11/2018    MICROALBUMIN Q1  08/20/2019      Chief Complaint   Patient presents with   [facility] Follow Up     [facility] 6/22-7/2    Dementia    Cancer    Irregular Heart Beat       1. Have you been to the ER, urgent care clinic since your last visit? Hospitalized since your last visit? [facility] adult failure to thrive 6/22-7/2    2. Have you seen or consulted any other health care providers outside of the [employer] [place] [address] since your last visit? Include any DEXA scan, mammography  or colon screening. No    3. Do you have an Advance Directive on file? no    4. Do you have a DNR on file? No     Patient is accompanied by self and daughter I have received verbal consent from Alisha Garibay to discuss any/all medical information while they are present in the room. No flowsheet data found.       [facility] [facility], [address]  [address] [place] [address] [address] [address]  Phone: [phone] Fax: [phone]    [facility], [address] [address]  Phone: [phone] Fax: [phone]    [address] [place] [id number] [facility]  [address] [facility]  [address] Lisa Nicole Johnson [address] [facility]  Phone: [phone] Fax: [phone]    [address] [facility] [address] [facility], [address] [facility] [address]  Phone: [phone] Fax: [phone]    [facility] [address], [address] to [facility] [address] [facility] [address]  Phone: [phone] Fax: [phone]        Patient reminded during visit to bring all medication bottles, OTC medications to all appointments.

## 2020-07-13 NOTE — PROGRESS NOTES
Daniel Ochoa is a 80 y.o. female who was seen by synchronous (real-time) audio-video technology on 7/13/2020 for Hospital Follow Up [facility] 6/22-7/2); Dementia; Cancer; and Irregular Heart Beat        Assessment & Plan:   Diagnoses and all orders for this visit:    1. Malignant neoplasm of esophagus, unspecified location ([facility]    2. Late onset Alzheimer's disease without behavioral disturbance ([facility]    3. Type 2 diabetes mellitus without complication, without long-term current use of insulin ([facility]    4. Essential hypertension    5. Chronic anemia    6. Mild protein-calorie malnutrition ([facility]    7. Hospice care patient        I spent at least 25 minutes on this visit with this established patient. Subjective:     Patient is seen virtually with her daughter at home. Has a few chronic medical issues as documented. Recent hospitalization with dysphasia and weight loss. I reviewed records in [facility]. Diagnosed with esophageal cancer. She is now followed by [facility] hospice. Has a PEG tube. According to daughter tolerating tube feeding well. Gait is unsteady but no recent falls. She has generalized weakness with protein malnutrition. According to daughter diabetes and HTN are stable. Denies any significant pain. COVID test was negative in the hospital.  Med list and most recent studies reviewed. Albumin is 2.4. Hemoglobin is stable. Depends on family for most ADLs. No signs or symptoms of COVID-19. No other new issues reported. Plan:  Continue current meds  Continue hospice care  Focus on comfort  Continue tube feedings  Fall precautions discussed  COVID-19 precautions discussed with pt  Follow-up 3 months or as needed  Prior to Admission medications    Medication Sig Start Date End Date Taking? Authorizing Provider   melatonin 3 mg tablet Take  by mouth. Yes Provider, Historical   metFORMIN (GLUCOPHAGE) 500 mg tablet Take 500 mg by mouth daily (with breakfast).    Yes Provider, Historical acetaminophen (TYLENOL) 650 mg suppository  7/3/20  Yes Provider, Historical   morphine (ROXANOL) 100 mg/5 mL (20 mg/mL) concentrated solution  7/3/20  Yes Provider, Historical   LORazepam IntensoL 2 mg/mL concentrated solution  7/3/20  Yes Provider, Historical   Hyosyne 0.125 mg/mL solution  7/3/20  Yes Provider, Historical   haloperidol (HALDOL) 2 mg/mL oral concentrate  7/3/20  Yes Provider, Historical   amLODIPine (NORVASC) 10 mg tablet 0.5 Tabs by Per G Tube route daily. TAKE 0.5 TABLET = 5 mg DAILY. Dose reduced. 7/2/20  Yes Yin Caputo MD   omeprazole (PRILOSEC) 20 mg capsule TAKE 1 CAPSULE DAILY FOR   GASTROESOPHAGEAL REFLUX    DISEASE 6/15/20  Yes Lucas Tamayo,    aspirin delayed-release 81 mg tablet Take 1 tablet by mouth once daily 5/5/20  Yes Marcello Tamayo DO   cloNIDine HCL (CATAPRES) 0.1 mg tablet TAKE 1 TABLET AT BEDTIME 3/11/20  Yes Mónica Feliz NP   acetaminophen 650 mg Tab Take 650 mg by mouth every six (6) hours as needed. 9/21/12  Yes Atif Merritt MD   QUEtiapine (SEROquel) 25 mg tablet 0.5 Tabs by Per G Tube route nightly. 7/2/20   Yin Caputo MD   multivit-folic acid-herbal 968 (WELLESSE PLUS) 400 mcg-200 mg/30 mL liqd oral liquid 30 mL by Per G Tube route daily. 7/2/20   Yin Caputo MD   thiamine HCL (B-1) 100 mg tablet 1 Tab by Per G Tube route daily. 7/2/20   Yin Caputo MD   atorvastatin (LIPITOR) 40 mg tablet TAKE 1 TABLET EVERY DAY 3/20/20   Jessica Vo DO   food supplemt, lactose-reduced (ENSURE CLEAR) liqd Take 237 mL by mouth two (2) times a day.  12/5/19   Mónica Feliz NP   mirtazapine (REMERON) 7.5 mg tablet TAKE 1 TABLET NIGHTLY 7/29/19   Jessica Vo DO     Patient Active Problem List    Diagnosis Date Noted    Hospice care patient 07/13/2020    Dysphagia 06/22/2020    FTT (failure to thrive) in adult 06/22/2020    Malignant neoplasm of esophagus ([facility] 06/22/2020    Late onset Alzheimer's disease without behavioral disturbance [facility] 09/03/2019    Oropharyngeal dysphagia 09/03/2019    Depression 02/02/2018    Chronic anemia 12/01/2017    Chronic diarrhea 12/16/2016    ACP (advance care planning) 12/16/2016    Memory impairment 12/16/2016    Type 2 diabetes mellitus without complication ([facility] 98/71/9339    Essential hypertension 04/17/2015    Hypercholesterolemia 04/17/2015    CVA, old, cognitive deficits 04/17/2015    Insomnia 04/17/2015    Cataracts, bilateral 04/17/2015     Current Outpatient Medications   Medication Sig Dispense Refill    melatonin 3 mg tablet Take  by mouth.  metFORMIN (GLUCOPHAGE) 500 mg tablet Take 500 mg by mouth daily (with breakfast).  acetaminophen (TYLENOL) 650 mg suppository       morphine (ROXANOL) 100 mg/5 mL (20 mg/mL) concentrated solution       LORazepam IntensoL 2 mg/mL concentrated solution       Hyosyne 0.125 mg/mL solution       haloperidol (HALDOL) 2 mg/mL oral concentrate       amLODIPine (NORVASC) 10 mg tablet 0.5 Tabs by Per G Tube route daily. TAKE 0.5 TABLET = 5 mg DAILY. Dose reduced. 90 Tab 1    omeprazole (PRILOSEC) 20 mg capsule TAKE 1 CAPSULE DAILY FOR   GASTROESOPHAGEAL REFLUX    DISEASE 30 Cap 3    aspirin delayed-release 81 mg tablet Take 1 tablet by mouth once daily 90 Tab 3    cloNIDine HCL (CATAPRES) 0.1 mg tablet TAKE 1 TABLET AT BEDTIME 90 Tab 1    acetaminophen 650 mg Tab Take 650 mg by mouth every six (6) hours as needed. 100 Tab 0    QUEtiapine (SEROquel) 25 mg tablet 0.5 Tabs by Per G Tube route nightly. 30 Tab 0    multivit-folic acid-herbal 578 (WELLESSE PLUS) 400 mcg-200 mg/30 mL liqd oral liquid 30 mL by Per G Tube route daily. 1 Bottle 0    thiamine HCL (B-1) 100 mg tablet 1 Tab by Per G Tube route daily. 30 Tab 0    atorvastatin (LIPITOR) 40 mg tablet TAKE 1 TABLET EVERY DAY 90 Tab 1    food supplemt, lactose-reduced (ENSURE CLEAR) liqd Take 237 mL by mouth two (2) times a day.  960 mL 2    mirtazapine (REMERON) 7.5 mg tablet TAKE 1 TABLET NIGHTLY 90 Tab 2     No Known Allergies  Past Medical History:   Diagnosis Date    Anemia     Depression     Diabetes ([facility]     Hypercholesterolemia     Hypertension     Stroke ([facility] 2012    CVA; left sided weakness     Past Surgical History:   Procedure Laterality Date    IR INSERT GASTROSTOMY TUBE PERC  2020     Social History     Tobacco Use    Smoking status: Former Smoker     Packs/day: 0.25     Last attempt to quit: 2012     Years since quittin.5    Smokeless tobacco: Never Used   Substance Use Topics    Alcohol use: No     Alcohol/week: 0.0 standard drinks       ROS    Objective:     Patient-Reported Vitals 2020   Patient-Reported Height 5f3        [INSTRUCTIONS:  \"[x]\" Indicates a positive item  \"[]\" Indicates a negative item  -- DELETE ALL ITEMS NOT EXAMINED]    Constitutional: [x] Appears well-developed and well-nourished [x] No apparent distress      [] Abnormal -     Mental status: [x] Alert and awake  [x] Oriented to person/place/time [x] Able to follow commands    [] Abnormal -     Eyes:   EOM    [x]  Normal    [] Abnormal -   Sclera  [x]  Normal    [] Abnormal -          Discharge [x]  None visible   [] Abnormal -     HENT: [x] Normocephalic, atraumatic  [] Abnormal -   [x] Mouth/Throat: Mucous membranes are moist    External Ears [x] Normal  [] Abnormal -    Neck: [x] No visualized mass [] Abnormal -     Pulmonary/Chest: [x] Respiratory effort normal   [x] No visualized signs of difficulty breathing or respiratory distress        [] Abnormal -      Musculoskeletal:   [x] Normal gait with no signs of ataxia         [x] Normal range of motion of neck        [] Abnormal -     Neurological:        [x] No Facial Asymmetry (Cranial nerve 7 motor function) (limited exam due to video visit)          [x] No gaze palsy        [] Abnormal -          Skin:        [x] No significant exanthematous lesions or discoloration noted on facial skin         [] Abnormal - Psychiatric:       [x] Normal Affect [] Abnormal -        [x] No Hallucinations    Other pertinent observable physical exam findings:-        We discussed the expected course, resolution and complications of the diagnosis(es) in detail. Medication risks, benefits, costs, interactions, and alternatives were discussed as indicated. I advised her to contact the office if her condition worsens, changes or fails to improve as anticipated. She expressed understanding with the diagnosis(es) and plan. Adelia Ruelas, who was evaluated through a patient-initiated, synchronous (real-time) audio-video encounter, and/or her healthcare decision maker, is aware that it is a billable service, with coverage as determined by her insurance carrier. She provided verbal consent to proceed: Yes, and patient identification was verified. It was conducted pursuant to the emergency declaration under the [address] [place] [address], [address] [place] [address] authority and the [employer] General Act. A caregiver was present when appropriate. Ability to conduct physical exam was limited. I was at home. The patient was at home.       Jovita Fierro DO

## 2020-07-17 ENCOUNTER — PATIENT OUTREACH (OUTPATIENT)
Dept: CASE MANAGEMENT | Age: 85
End: 2020-07-17

## 2020-07-17 NOTE — PROGRESS NOTES
Patient resolved from Transition of Care episode on 7/17/20  Discussed COVID-19 related testing which was available at this time. Test results were negative. Patient informed of results, if available? yes     Patient/family has been provided the following resources and education related to COVID-19:                         Signs, symptoms and red flags related to COVID-19            CDC exposure and quarantine guidelines            [employer] exposure contact - [phone]            Contact for their local Department of Health                 Patient currently reports that the following symptoms have improved:  not eating. No further outreach scheduled with this CTN/ACM/LPN/HC/ MA. Episode of Care resolved. Patient has this CTN/ACM/LPN/HC/MA contact information if future needs arise.

## 2021-06-13 NOTE — PROGRESS NOTES
BECKY:    -EGD with PEG placement today  -Message left for daughter Rahul Beck ([phone]) to return my call     . S.  [employer] , [place] no shortness of breath/no diaphoresis

## 2022-06-23 NOTE — PROCEDURES
Orthopedic Fracture Reduction    Patient found to have displaced left distal radius fracture requiring reduction and immobilization. Procedure explained to patient detailing risks and benefits and she elects to proceed. Hematoma block was utilized for analgesia using 10cc of 1% lidocaine. Once adequate analgesia was achieved patient was placed in finger traps and hung in traction for a period of time. Visual improvement of her dorsal deformity was observed and fracture stepoff was less pronounced. Patient was then placed in a sugar tong splint which was then molded into a position of relative wrist flexion. Post-reduction images were obtained unfortunately showing minimal movement on reduction. Patient was placed in a sling for comfort and elevation. Patient tolerated procedure well which took approximately 15min.     [address] consult

## 2023-12-22 NOTE — PROGRESS NOTES
December 22, 2023     Patient: Loreto Rodas  YOB: 1982  Date of Visit: 12/22/2023    To Whom it May Concern:    Loreto Rodas was seen in my clinic on 12/22/2023 at 12:30 pm. Please excuse Loreto for her absence from work on this day to make the appointment.  She has been unable to attend to work since December 14, 2023.  She will be able to return to work on December 27, 2023.    If you have any questions or concerns, please don't hesitate to call.         Sincerely,         Elmira Andrews MD        CC: No Recipients   Health Maintenance Due   Topic Date Due    Shingrix Vaccine Age 49> (1 of 2) 11/14/1981    Bone Densitometry (Dexa) Screening  11/14/1996    GLAUCOMA SCREENING Q2Y  08/11/2018    EYE EXAM RETINAL OR DILATED  08/11/2018       Chief Complaint   Patient presents with    Hypertension     f/u visit    Diabetes    Cholesterol Problem       1. Have you been to the ER, urgent care clinic since your last visit? Hospitalized since your last visit? No    2. Have you seen or consulted any other health care providers outside of the [address] [place] [address] since your last visit? Include any pap smears or colon screening. No    3) Do you have an Advance Directive on file? no    4) Are you interested in receiving information on Advance Directives? NO      Patient is accompanied by self I have received verbal consent from Lars Schlatter to discuss any/all medical information while they are present in the room.

## (undated) DEVICE — STERILE POLYISOPRENE POWDER-FREE SURGICAL GLOVES: Brand: PROTEXIS

## (undated) DEVICE — BLOCK BITE L 20X27MM OD60FR BLU AD W/ STRP SLD POLYETH ENDO

## (undated) DEVICE — TUBING HYDR IRR --

## (undated) DEVICE — (D)FCPS GRSP 9MM 230CMLX2.4MM -- DISC BY MFR

## (undated) DEVICE — FORCEPS BX L240CM JAW DIA2.8MM L CAP W/ NDL MIC MESH TOOTH

## (undated) DEVICE — 1200 GUARD II KIT W/5MM TUBE W/O VAC TUBE: Brand: GUARDIAN

## (undated) DEVICE — MEDI-VAC NON-CONDUCTIVE SUCTION TUBING: Brand: CARDINAL HEALTH

## (undated) DEVICE — SINGLE USE GRASPING FORCEPS: Brand: SINGLE USE GRASPING FORCEPS

## (undated) DEVICE — KENDALL SCD EXPRESS SLEEVES, KNEE LENGTH, MEDIUM: Brand: KENDALL SCD

## (undated) DEVICE — INFECTION CONTROL KIT SYS